# Patient Record
Sex: FEMALE | Race: BLACK OR AFRICAN AMERICAN | NOT HISPANIC OR LATINO | Employment: UNEMPLOYED | URBAN - METROPOLITAN AREA
[De-identification: names, ages, dates, MRNs, and addresses within clinical notes are randomized per-mention and may not be internally consistent; named-entity substitution may affect disease eponyms.]

---

## 2023-07-21 ENCOUNTER — APPOINTMENT (OUTPATIENT)
Dept: GENERAL RADIOLOGY | Facility: HOSPITAL | Age: 63
DRG: 190 | End: 2023-07-21
Payer: COMMERCIAL

## 2023-07-21 ENCOUNTER — APPOINTMENT (OUTPATIENT)
Dept: CT IMAGING | Facility: HOSPITAL | Age: 63
DRG: 190 | End: 2023-07-21
Payer: COMMERCIAL

## 2023-07-21 ENCOUNTER — HOSPITAL ENCOUNTER (INPATIENT)
Facility: HOSPITAL | Age: 63
LOS: 4 days | Discharge: HOME | DRG: 190 | End: 2023-07-25
Attending: EMERGENCY MEDICINE | Admitting: STUDENT IN AN ORGANIZED HEALTH CARE EDUCATION/TRAINING PROGRAM
Payer: COMMERCIAL

## 2023-07-21 DIAGNOSIS — J96.01 ACUTE RESPIRATORY FAILURE WITH HYPOXIA: ICD-10-CM

## 2023-07-21 DIAGNOSIS — J18.9 PNEUMONIA DUE TO INFECTIOUS ORGANISM, UNSPECIFIED LATERALITY, UNSPECIFIED PART OF LUNG: ICD-10-CM

## 2023-07-21 DIAGNOSIS — R07.89 CHEST PAIN, ATYPICAL: ICD-10-CM

## 2023-07-21 DIAGNOSIS — J44.1 COPD WITH ACUTE EXACERBATION: Primary | ICD-10-CM

## 2023-07-21 DIAGNOSIS — J44.1 COPD EXACERBATION: ICD-10-CM

## 2023-07-21 DIAGNOSIS — E87.6 HYPOKALEMIA: ICD-10-CM

## 2023-07-21 LAB
ALBUMIN SERPL-MCNC: 4.4 G/DL (ref 3.5–5.2)
ALBUMIN/GLOB SERPL: 1.9 G/DL
ALP SERPL-CCNC: 64 U/L (ref 39–117)
ALT SERPL W P-5'-P-CCNC: 16 U/L (ref 1–33)
ANION GAP SERPL CALCULATED.3IONS-SCNC: 14.5 MMOL/L (ref 5–15)
AST SERPL-CCNC: 19 U/L (ref 1–32)
BILIRUB SERPL-MCNC: 0.4 MG/DL (ref 0–1.2)
BUN SERPL-MCNC: 9 MG/DL (ref 8–23)
BUN/CREAT SERPL: 16.1 (ref 7–25)
CALCIUM SPEC-SCNC: 9.4 MG/DL (ref 8.6–10.5)
CHLORIDE SERPL-SCNC: 100 MMOL/L (ref 98–107)
CO2 SERPL-SCNC: 28.5 MMOL/L (ref 22–29)
CREAT SERPL-MCNC: 0.56 MG/DL (ref 0.57–1)
D-LACTATE SERPL-SCNC: 1.9 MMOL/L (ref 0.5–2)
D-LACTATE SERPL-SCNC: 2 MMOL/L (ref 0.5–2)
D-LACTATE SERPL-SCNC: 2.2 MMOL/L (ref 0.5–2)
D-LACTATE SERPL-SCNC: 2.3 MMOL/L (ref 0.5–2)
DEPRECATED RDW RBC AUTO: 50.4 FL (ref 37–54)
EGFRCR SERPLBLD CKD-EPI 2021: 103.3 ML/MIN/1.73
EOSINOPHIL # BLD MANUAL: 1.04 10*3/MM3 (ref 0–0.4)
EOSINOPHIL NFR BLD MANUAL: 8 % (ref 0.3–6.2)
ERYTHROCYTE [DISTWIDTH] IN BLOOD BY AUTOMATED COUNT: 15.9 % (ref 12.3–15.4)
FLUAV AG NPH QL: NEGATIVE
FLUBV AG NPH QL IA: NEGATIVE
GEN 5 2HR TROPONIN T REFLEX: 179 NG/L
GLOBULIN UR ELPH-MCNC: 2.3 GM/DL
GLUCOSE BLDC GLUCOMTR-MCNC: 144 MG/DL (ref 70–99)
GLUCOSE SERPL-MCNC: 126 MG/DL (ref 65–99)
HCT VFR BLD AUTO: 40.5 % (ref 34–46.6)
HGB BLD-MCNC: 13.9 G/DL (ref 12–15.9)
HOLD SPECIMEN: NORMAL
HOLD SPECIMEN: NORMAL
LYMPHOCYTES # BLD MANUAL: 7.42 10*3/MM3 (ref 0.7–3.1)
LYMPHOCYTES NFR BLD MANUAL: 6 % (ref 5–12)
MAGNESIUM SERPL-MCNC: 1.6 MG/DL (ref 1.6–2.4)
MCH RBC QN AUTO: 29.9 PG (ref 26.6–33)
MCHC RBC AUTO-ENTMCNC: 34.3 G/DL (ref 31.5–35.7)
MCV RBC AUTO: 87.1 FL (ref 79–97)
MONOCYTES # BLD: 0.78 10*3/MM3 (ref 0.1–0.9)
NEUTROPHILS # BLD AUTO: 3.78 10*3/MM3 (ref 1.7–7)
NEUTROPHILS NFR BLD MANUAL: 29 % (ref 42.7–76)
NT-PROBNP SERPL-MCNC: 176.1 PG/ML (ref 0–900)
PLAT MORPH BLD: NORMAL
PLATELET # BLD AUTO: 298 10*3/MM3 (ref 140–450)
PMV BLD AUTO: 9.6 FL (ref 6–12)
POTASSIUM SERPL-SCNC: 2.9 MMOL/L (ref 3.5–5.2)
PROCALCITONIN SERPL-MCNC: 0.02 NG/ML (ref 0–0.25)
PROT SERPL-MCNC: 6.7 G/DL (ref 6–8.5)
QT INTERVAL: 391 MS
QT INTERVAL: 426 MS
RBC # BLD AUTO: 4.65 10*6/MM3 (ref 3.77–5.28)
RBC MORPH BLD: NORMAL
SCAN SLIDE: NORMAL
SODIUM SERPL-SCNC: 143 MMOL/L (ref 136–145)
TROPONIN T DELTA: -7 NG/L
TROPONIN T SERPL HS-MCNC: 186 NG/L
TROPONIN T SERPL HS-MCNC: 33 NG/L
VARIANT LYMPHS NFR BLD MANUAL: 57 % (ref 19.6–45.3)
WBC MORPH BLD: NORMAL
WBC NRBC COR # BLD: 13.02 10*3/MM3 (ref 3.4–10.8)
WHOLE BLOOD HOLD COAG: NORMAL
WHOLE BLOOD HOLD COAG: NORMAL
WHOLE BLOOD HOLD SPECIMEN: NORMAL
WHOLE BLOOD HOLD SPECIMEN: NORMAL

## 2023-07-21 PROCEDURE — 94799 UNLISTED PULMONARY SVC/PX: CPT

## 2023-07-21 PROCEDURE — 0 POTASSIUM CHLORIDE 10 MEQ/100ML SOLUTION: Performed by: STUDENT IN AN ORGANIZED HEALTH CARE EDUCATION/TRAINING PROGRAM

## 2023-07-21 PROCEDURE — 93010 ELECTROCARDIOGRAM REPORT: CPT | Performed by: INTERNAL MEDICINE

## 2023-07-21 PROCEDURE — 94640 AIRWAY INHALATION TREATMENT: CPT

## 2023-07-21 PROCEDURE — 25010000002 MAGNESIUM SULFATE IN D5W 1G/100ML (PREMIX) 1-5 GM/100ML-% SOLUTION: Performed by: STUDENT IN AN ORGANIZED HEALTH CARE EDUCATION/TRAINING PROGRAM

## 2023-07-21 PROCEDURE — 71260 CT THORAX DX C+: CPT

## 2023-07-21 PROCEDURE — 99285 EMERGENCY DEPT VISIT HI MDM: CPT

## 2023-07-21 PROCEDURE — 83605 ASSAY OF LACTIC ACID: CPT | Performed by: EMERGENCY MEDICINE

## 2023-07-21 PROCEDURE — 80053 COMPREHEN METABOLIC PANEL: CPT | Performed by: EMERGENCY MEDICINE

## 2023-07-21 PROCEDURE — 71045 X-RAY EXAM CHEST 1 VIEW: CPT

## 2023-07-21 PROCEDURE — 84145 PROCALCITONIN (PCT): CPT | Performed by: STUDENT IN AN ORGANIZED HEALTH CARE EDUCATION/TRAINING PROGRAM

## 2023-07-21 PROCEDURE — 85007 BL SMEAR W/DIFF WBC COUNT: CPT | Performed by: EMERGENCY MEDICINE

## 2023-07-21 PROCEDURE — 25010000002 ONDANSETRON PER 1 MG: Performed by: INTERNAL MEDICINE

## 2023-07-21 PROCEDURE — 87804 INFLUENZA ASSAY W/OPTIC: CPT | Performed by: STUDENT IN AN ORGANIZED HEALTH CARE EDUCATION/TRAINING PROGRAM

## 2023-07-21 PROCEDURE — 82948 REAGENT STRIP/BLOOD GLUCOSE: CPT

## 2023-07-21 PROCEDURE — 25010000002 ENOXAPARIN PER 10 MG: Performed by: INTERNAL MEDICINE

## 2023-07-21 PROCEDURE — 25510000001 IOPAMIDOL PER 1 ML: Performed by: INTERNAL MEDICINE

## 2023-07-21 PROCEDURE — 36415 COLL VENOUS BLD VENIPUNCTURE: CPT | Performed by: EMERGENCY MEDICINE

## 2023-07-21 PROCEDURE — 83880 ASSAY OF NATRIURETIC PEPTIDE: CPT | Performed by: EMERGENCY MEDICINE

## 2023-07-21 PROCEDURE — 25010000002 METHYLPREDNISOLONE PER 125 MG: Performed by: STUDENT IN AN ORGANIZED HEALTH CARE EDUCATION/TRAINING PROGRAM

## 2023-07-21 PROCEDURE — 83735 ASSAY OF MAGNESIUM: CPT | Performed by: EMERGENCY MEDICINE

## 2023-07-21 PROCEDURE — 25010000002 CEFTRIAXONE PER 250 MG: Performed by: EMERGENCY MEDICINE

## 2023-07-21 PROCEDURE — 94664 DEMO&/EVAL PT USE INHALER: CPT

## 2023-07-21 PROCEDURE — 99221 1ST HOSP IP/OBS SF/LOW 40: CPT | Performed by: INTERNAL MEDICINE

## 2023-07-21 PROCEDURE — 93005 ELECTROCARDIOGRAM TRACING: CPT | Performed by: EMERGENCY MEDICINE

## 2023-07-21 PROCEDURE — 84484 ASSAY OF TROPONIN QUANT: CPT | Performed by: STUDENT IN AN ORGANIZED HEALTH CARE EDUCATION/TRAINING PROGRAM

## 2023-07-21 PROCEDURE — 99222 1ST HOSP IP/OBS MODERATE 55: CPT | Performed by: STUDENT IN AN ORGANIZED HEALTH CARE EDUCATION/TRAINING PROGRAM

## 2023-07-21 PROCEDURE — 85025 COMPLETE CBC W/AUTO DIFF WBC: CPT | Performed by: EMERGENCY MEDICINE

## 2023-07-21 PROCEDURE — 25010000002 ENOXAPARIN PER 10 MG: Performed by: STUDENT IN AN ORGANIZED HEALTH CARE EDUCATION/TRAINING PROGRAM

## 2023-07-21 PROCEDURE — 99291 CRITICAL CARE FIRST HOUR: CPT | Performed by: INTERNAL MEDICINE

## 2023-07-21 PROCEDURE — 84484 ASSAY OF TROPONIN QUANT: CPT | Performed by: EMERGENCY MEDICINE

## 2023-07-21 PROCEDURE — 93005 ELECTROCARDIOGRAM TRACING: CPT | Performed by: INTERNAL MEDICINE

## 2023-07-21 PROCEDURE — 87040 BLOOD CULTURE FOR BACTERIA: CPT | Performed by: EMERGENCY MEDICINE

## 2023-07-21 PROCEDURE — 25010000002 AZITHROMYCIN PER 500 MG: Performed by: EMERGENCY MEDICINE

## 2023-07-21 RX ORDER — ENOXAPARIN SODIUM 100 MG/ML
60 INJECTION SUBCUTANEOUS EVERY 24 HOURS
Status: DISCONTINUED | OUTPATIENT
Start: 2023-07-22 | End: 2023-07-21

## 2023-07-21 RX ORDER — IPRATROPIUM BROMIDE AND ALBUTEROL SULFATE 2.5; .5 MG/3ML; MG/3ML
3 SOLUTION RESPIRATORY (INHALATION)
Status: DISCONTINUED | OUTPATIENT
Start: 2023-07-21 | End: 2023-07-25 | Stop reason: HOSPADM

## 2023-07-21 RX ORDER — CEFTRIAXONE SODIUM 1 G/50ML
1000 INJECTION, SOLUTION INTRAVENOUS EVERY 24 HOURS
Status: DISCONTINUED | OUTPATIENT
Start: 2023-07-22 | End: 2023-07-25 | Stop reason: HOSPADM

## 2023-07-21 RX ORDER — GUAIFENESIN 600 MG/1
600 TABLET, EXTENDED RELEASE ORAL EVERY 12 HOURS SCHEDULED
Status: DISCONTINUED | OUTPATIENT
Start: 2023-07-21 | End: 2023-07-25 | Stop reason: HOSPADM

## 2023-07-21 RX ORDER — IPRATROPIUM BROMIDE AND ALBUTEROL SULFATE 2.5; .5 MG/3ML; MG/3ML
3 SOLUTION RESPIRATORY (INHALATION) ONCE
Status: COMPLETED | OUTPATIENT
Start: 2023-07-21 | End: 2023-07-21

## 2023-07-21 RX ORDER — ENOXAPARIN SODIUM 100 MG/ML
1 INJECTION SUBCUTANEOUS EVERY 12 HOURS
Status: DISCONTINUED | OUTPATIENT
Start: 2023-07-21 | End: 2023-07-25 | Stop reason: HOSPADM

## 2023-07-21 RX ORDER — METOPROLOL SUCCINATE 25 MG/1
25 TABLET, EXTENDED RELEASE ORAL
Status: DISCONTINUED | OUTPATIENT
Start: 2023-07-21 | End: 2023-07-25 | Stop reason: HOSPADM

## 2023-07-21 RX ORDER — ASPIRIN 81 MG/1
324 TABLET, CHEWABLE ORAL ONCE
Status: COMPLETED | OUTPATIENT
Start: 2023-07-21 | End: 2023-07-21

## 2023-07-21 RX ORDER — MORPHINE SULFATE 2 MG/ML
2 INJECTION, SOLUTION INTRAMUSCULAR; INTRAVENOUS EVERY 4 HOURS PRN
Status: DISCONTINUED | OUTPATIENT
Start: 2023-07-21 | End: 2023-07-25 | Stop reason: HOSPADM

## 2023-07-21 RX ORDER — CEFTRIAXONE SODIUM 1 G/50ML
1000 INJECTION, SOLUTION INTRAVENOUS ONCE
Status: COMPLETED | OUTPATIENT
Start: 2023-07-21 | End: 2023-07-21

## 2023-07-21 RX ORDER — ACETAMINOPHEN 325 MG/1
650 TABLET ORAL EVERY 6 HOURS PRN
Status: DISCONTINUED | OUTPATIENT
Start: 2023-07-21 | End: 2023-07-25 | Stop reason: HOSPADM

## 2023-07-21 RX ORDER — MAGNESIUM SULFATE 1 G/100ML
1 INJECTION INTRAVENOUS ONCE
Status: COMPLETED | OUTPATIENT
Start: 2023-07-21 | End: 2023-07-21

## 2023-07-21 RX ORDER — ENOXAPARIN SODIUM 100 MG/ML
40 INJECTION SUBCUTANEOUS EVERY 24 HOURS
Status: DISCONTINUED | OUTPATIENT
Start: 2023-07-21 | End: 2023-07-21

## 2023-07-21 RX ORDER — MULTIPLE VITAMINS W/ MINERALS TAB 9MG-400MCG
1 TAB ORAL DAILY
COMMUNITY

## 2023-07-21 RX ORDER — ENOXAPARIN SODIUM 100 MG/ML
30 INJECTION SUBCUTANEOUS ONCE
Status: COMPLETED | OUTPATIENT
Start: 2023-07-21 | End: 2023-07-21

## 2023-07-21 RX ORDER — SODIUM CHLORIDE 0.9 % (FLUSH) 0.9 %
10 SYRINGE (ML) INJECTION AS NEEDED
Status: DISCONTINUED | OUTPATIENT
Start: 2023-07-21 | End: 2023-07-25 | Stop reason: HOSPADM

## 2023-07-21 RX ORDER — POTASSIUM CHLORIDE 7.45 MG/ML
10 INJECTION INTRAVENOUS ONCE
Status: COMPLETED | OUTPATIENT
Start: 2023-07-21 | End: 2023-07-21

## 2023-07-21 RX ORDER — NITROGLYCERIN 0.4 MG/1
0.4 TABLET SUBLINGUAL
Status: DISCONTINUED | OUTPATIENT
Start: 2023-07-21 | End: 2023-07-25 | Stop reason: HOSPADM

## 2023-07-21 RX ORDER — POTASSIUM CHLORIDE 750 MG/1
40 CAPSULE, EXTENDED RELEASE ORAL ONCE
Status: COMPLETED | OUTPATIENT
Start: 2023-07-21 | End: 2023-07-21

## 2023-07-21 RX ORDER — NICOTINE 21 MG/24HR
1 PATCH, TRANSDERMAL 24 HOURS TRANSDERMAL EVERY 24 HOURS
Status: DISCONTINUED | OUTPATIENT
Start: 2023-07-21 | End: 2023-07-25 | Stop reason: HOSPADM

## 2023-07-21 RX ORDER — METHYLPREDNISOLONE SODIUM SUCCINATE 125 MG/2ML
125 INJECTION, POWDER, LYOPHILIZED, FOR SOLUTION INTRAMUSCULAR; INTRAVENOUS ONCE
Status: DISCONTINUED | OUTPATIENT
Start: 2023-07-21 | End: 2023-07-21

## 2023-07-21 RX ORDER — ENOXAPARIN SODIUM 100 MG/ML
20 INJECTION SUBCUTANEOUS ONCE
Status: DISCONTINUED | OUTPATIENT
Start: 2023-07-21 | End: 2023-07-21

## 2023-07-21 RX ORDER — BUTALBITAL, ACETAMINOPHEN AND CAFFEINE 300; 40; 50 MG/1; MG/1; MG/1
1 CAPSULE ORAL EVERY 4 HOURS PRN
Status: DISCONTINUED | OUTPATIENT
Start: 2023-07-21 | End: 2023-07-25 | Stop reason: HOSPADM

## 2023-07-21 RX ORDER — METHYLPREDNISOLONE SODIUM SUCCINATE 125 MG/2ML
62.5 INJECTION, POWDER, LYOPHILIZED, FOR SOLUTION INTRAMUSCULAR; INTRAVENOUS DAILY
Status: DISCONTINUED | OUTPATIENT
Start: 2023-07-21 | End: 2023-07-22

## 2023-07-21 RX ORDER — ONDANSETRON 2 MG/ML
4 INJECTION INTRAMUSCULAR; INTRAVENOUS EVERY 4 HOURS PRN
Status: DISCONTINUED | OUTPATIENT
Start: 2023-07-21 | End: 2023-07-25 | Stop reason: HOSPADM

## 2023-07-21 RX ORDER — HYDROCHLOROTHIAZIDE 25 MG/1
25 TABLET ORAL DAILY
COMMUNITY

## 2023-07-21 RX ORDER — AZITHROMYCIN 250 MG/1
500 TABLET, FILM COATED ORAL EVERY 24 HOURS
Status: COMPLETED | OUTPATIENT
Start: 2023-07-22 | End: 2023-07-23

## 2023-07-21 RX ADMIN — GUAIFENESIN 600 MG: 600 TABLET ORAL at 12:35

## 2023-07-21 RX ADMIN — IPRATROPIUM BROMIDE AND ALBUTEROL SULFATE 3 ML: .5; 3 SOLUTION RESPIRATORY (INHALATION) at 02:45

## 2023-07-21 RX ADMIN — METHYLPREDNISOLONE SODIUM SUCCINATE 62.5 MG: 125 INJECTION INTRAMUSCULAR; INTRAVENOUS at 12:34

## 2023-07-21 RX ADMIN — BUTALBITA,ACETAMINOPHEN AND CAFFEINE 1 CAPSULE: 50; 300; 40 CAPSULE ORAL at 17:35

## 2023-07-21 RX ADMIN — ASPIRIN 324 MG: 81 TABLET, CHEWABLE ORAL at 11:26

## 2023-07-21 RX ADMIN — ONDANSETRON 4 MG: 2 INJECTION INTRAMUSCULAR; INTRAVENOUS at 20:33

## 2023-07-21 RX ADMIN — IPRATROPIUM BROMIDE AND ALBUTEROL SULFATE 3 ML: 2.5; .5 SOLUTION RESPIRATORY (INHALATION) at 20:03

## 2023-07-21 RX ADMIN — ENOXAPARIN SODIUM 30 MG: 100 INJECTION SUBCUTANEOUS at 11:26

## 2023-07-21 RX ADMIN — IOPAMIDOL 100 ML: 755 INJECTION, SOLUTION INTRAVENOUS at 22:42

## 2023-07-21 RX ADMIN — IPRATROPIUM BROMIDE AND ALBUTEROL SULFATE 3 ML: 2.5; .5 SOLUTION RESPIRATORY (INHALATION) at 12:45

## 2023-07-21 RX ADMIN — METOPROLOL SUCCINATE 25 MG: 25 TABLET, EXTENDED RELEASE ORAL at 11:26

## 2023-07-21 RX ADMIN — ENOXAPARIN SODIUM 40 MG: 100 INJECTION SUBCUTANEOUS at 08:48

## 2023-07-21 RX ADMIN — Medication 10 ML: at 21:39

## 2023-07-21 RX ADMIN — AZITHROMYCIN 500 MG: 500 INJECTION, POWDER, LYOPHILIZED, FOR SOLUTION INTRAVENOUS at 06:04

## 2023-07-21 RX ADMIN — IPRATROPIUM BROMIDE AND ALBUTEROL SULFATE 3 ML: .5; 3 SOLUTION RESPIRATORY (INHALATION) at 05:31

## 2023-07-21 RX ADMIN — GUAIFENESIN 600 MG: 600 TABLET ORAL at 21:38

## 2023-07-21 RX ADMIN — NICOTINE 1 PATCH: 21 PATCH, EXTENDED RELEASE TRANSDERMAL at 08:48

## 2023-07-21 RX ADMIN — POTASSIUM CHLORIDE 10 MEQ: 7.46 INJECTION, SOLUTION INTRAVENOUS at 08:48

## 2023-07-21 RX ADMIN — CEFTRIAXONE SODIUM 1000 MG: 1 INJECTION, SOLUTION INTRAVENOUS at 05:28

## 2023-07-21 RX ADMIN — POTASSIUM CHLORIDE 40 MEQ: 750 CAPSULE, EXTENDED RELEASE ORAL at 06:01

## 2023-07-21 RX ADMIN — IPRATROPIUM BROMIDE AND ALBUTEROL SULFATE 3 ML: 2.5; .5 SOLUTION RESPIRATORY (INHALATION) at 08:55

## 2023-07-21 RX ADMIN — ENOXAPARIN SODIUM 60 MG: 60 INJECTION SUBCUTANEOUS at 21:38

## 2023-07-21 RX ADMIN — MAGNESIUM SULFATE 1 G: 1 INJECTION INTRAVENOUS at 11:26

## 2023-07-21 RX ADMIN — NITROGLYCERIN 0.4 MG: 0.4 TABLET, ORALLY DISINTEGRATING SUBLINGUAL at 09:39

## 2023-07-21 RX ADMIN — ACETAMINOPHEN 650 MG: 325 TABLET ORAL at 12:34

## 2023-07-21 RX ADMIN — IPRATROPIUM BROMIDE AND ALBUTEROL SULFATE 3 ML: 2.5; .5 SOLUTION RESPIRATORY (INHALATION) at 15:45

## 2023-07-21 NOTE — CASE MANAGEMENT/SOCIAL WORK
Discharge Planning Assessment   [place]     Patient Name: Gloria Sunshine  MRN: [id number]  Today's Date: 7/21/2023    Admit Date: 7/21/2023    Plan: patient is not aware of her pcp name, [place] [facility] [place] [address]. patient is independent at home with spouse and step son (adult) patient is currently working full time. plans are to return home at discharge.   Discharge Needs Assessment    No documentation.                  Discharge Plan       Row Name 07/21/23 1714       Plan    Plan patient is not aware of her pcp name, [place] [facility] [place] [address] would like [facility]. patient is independent at home with spouse and step son (adult) patient is currently working full time. plans are to return home at discharge.    Final Discharge Disposition Code 01 - home or self-care                  Continued Care and Services - Admitted Since 7/21/2023    Coordination has not been started for this encounter.          Demographic Summary    No documentation.                  Functional Status    No documentation.                  Psychosocial    No documentation.                  Abuse/Neglect    No documentation.                  Legal    No documentation.                  Substance Abuse    No documentation.                  Patient Forms    No documentation.                     Rosaura Pardo RN

## 2023-07-21 NOTE — ED PROVIDER NOTES
Time: 5:28 AM EDT  Date of encounter:  7/21/2023  Independent Historian/Clinical History and Information was obtained by:   Patient    History is limited by: N/A    Chief Complaint: shortness of breath      History of Present Illness:  Patient is a 63 y.o. year old female who presents to the emergency department for evaluation of Shortness of breath.  Patient states she has been feeling short of breath for last 2-week with a cough.  Cough is productive with yellow sputum.  Last night it got acutely worse.  She states she was unable to breathe.  She called EMS and was brought to the emergency department she did receive a nebulizer treatment as well as dose of Solu-Medrol by EMS.  She denies any known history of COPD.  She does report history of smoking.    HPI    Patient Care Team  Primary Care Provider: Provider, No Known    Past Medical History:     No Known Allergies  Past Medical History:   Diagnosis Date    Hypertension      Past Surgical History:   Procedure Laterality Date    CYST REMOVAL Left     breast    TUBAL ABDOMINAL LIGATION       History reviewed. No pertinent family history.    Home Medications:  Prior to Admission medications    Not on File        Social History:   Social History     Tobacco Use    Smoking status: Every Day     Packs/day: 0.50     Types: Cigarettes         Review of Systems:  Review of Systems   Constitutional:  Negative for chills and fever.   HENT:  Negative for congestion, ear pain and sore throat.    Eyes:  Negative for pain.   Respiratory:  Positive for cough and shortness of breath. Negative for chest tightness.    Cardiovascular:  Negative for chest pain.   Gastrointestinal:  Negative for abdominal pain, diarrhea, nausea and vomiting.   Genitourinary:  Negative for flank pain and hematuria.   Musculoskeletal:  Negative for joint swelling.   Skin:  Negative for pallor.   Neurological:  Negative for seizures and headaches.   Psychiatric/Behavioral:  The patient is  "nervous/anxious.    All other systems reviewed and are negative.     Physical Exam:  /98 (BP Location: Right arm, Patient Position: Sitting)   Pulse 82   Temp 97.7 °F (36.5 °C) (Oral)   Resp 18   Ht 180.3 cm (71\")   Wt 56.4 kg (124 lb 5.4 oz)   SpO2 97%   BMI 17.34 kg/m²     Physical Exam  Constitutional:       Appearance: Normal appearance.   HENT:      Head: Normocephalic and atraumatic.      Nose: Nose normal.      Mouth/Throat:      Mouth: Mucous membranes are moist.   Eyes:      Extraocular Movements: Extraocular movements intact.      Conjunctiva/sclera: Conjunctivae normal.      Pupils: Pupils are equal, round, and reactive to light.   Cardiovascular:      Rate and Rhythm: Normal rate and regular rhythm.      Pulses: Normal pulses.      Heart sounds: Normal heart sounds.   Pulmonary:      Effort: Pulmonary effort is normal.      Breath sounds: Wheezing and rhonchi present.   Abdominal:      General: There is no distension.      Palpations: Abdomen is soft.      Tenderness: There is no abdominal tenderness.   Musculoskeletal:         General: Normal range of motion.      Cervical back: Normal range of motion.   Skin:     General: Skin is warm and dry.      Capillary Refill: Capillary refill takes less than 2 seconds.   Neurological:      General: No focal deficit present.      Mental Status: She is alert and oriented to person, place, and time. Mental status is at baseline.   Psychiatric:         Mood and Affect: Mood normal.         Behavior: Behavior normal.                Procedures:  Procedures      Medical Decision Making:      Comorbidities that affect care:    Hypertension, Smoking    External Notes reviewed:    None      The following orders were placed and all results were independently analyzed by me:  Orders Placed This Encounter   Procedures    Blood Culture - Blood,    Blood Culture - Blood,    XR Chest 1 View    Baldwin Draw    Comprehensive Metabolic Panel    BNP    Single High " Sensitivity Troponin T    CBC Auto Differential    Lactic Acid, Plasma    Scan Slide    Manual Differential    Magnesium    STAT Lactic Acid, Reflex    NPO Diet NPO Type: Strict NPO    Undress & Gown    Continuous Pulse Oximetry    Vital Signs    Inpatient Hospitalist Consult    Oxygen Therapy- Nasal Cannula; Titrate 1-6 LPM Per SpO2; 90 - 95%    ECG 12 Lead ED Triage Standing Order; SOA    Insert Peripheral IV    CBC & Differential    Green Top (Gel)    Lavender Top    Gold Top - SST    Light Blue Top       Medications Given in the Emergency Department:  Medications   sodium chloride 0.9 % flush 10 mL (has no administration in time range)   methylPREDNISolone sodium succinate (SOLU-Medrol) injection 125 mg (125 mg Intravenous Not Given 7/21/23 0247)   cefTRIAXone (ROCEPHIN) IVPB 1,000 mg (1,000 mg Intravenous New Bag 7/21/23 0528)   AZITHROMYCIN 500 MG/250 ML 0.9% NS IVPB (vial-mate) (has no administration in time range)   potassium chloride 10 mEq in 100 mL IVPB (has no administration in time range)   potassium chloride (MICRO-K) CR capsule 40 mEq (has no administration in time range)   ipratropium-albuterol (DUO-NEB) nebulizer solution 3 mL (3 mL Nebulization Given 7/21/23 0245)   ipratropium-albuterol (DUO-NEB) nebulizer solution 3 mL (3 mL Nebulization Given 7/21/23 0245)   ipratropium-albuterol (DUO-NEB) nebulizer solution 3 mL (3 mL Nebulization Given 7/21/23 0531)        ED Course:         Labs:    Lab Results (last 24 hours)       Procedure Component Value Units Date/Time    CBC & Differential [[id number]]  (Abnormal) Collected: 07/21/23 0125    Specimen: Blood Updated: 07/21/23 0158    Narrative:      The following orders were created for panel order CBC & Differential.  Procedure                               Abnormality         Status                     ---------                               -----------         ------                     CBC Auto Differential[[id number]]        Abnormal            Final  result               Scan Slide[[id number]]                                       Final result                 Please view results for these tests on the individual orders.    Comprehensive Metabolic Panel [[id number]]  (Abnormal) Collected: 07/21/23 0125    Specimen: Blood Updated: 07/21/23 0221     Glucose 126 mg/dL      BUN 9 mg/dL      Creatinine 0.56 mg/dL      Sodium 143 mmol/L      Potassium 2.9 mmol/L      Chloride 100 mmol/L      CO2 28.5 mmol/L      Calcium 9.4 mg/dL      Total Protein 6.7 g/dL      Albumin 4.4 g/dL      ALT (SGPT) 16 U/L      AST (SGOT) 19 U/L      Alkaline Phosphatase 64 U/L      Total Bilirubin 0.4 mg/dL      Globulin 2.3 gm/dL      A/G Ratio 1.9 g/dL      BUN/Creatinine Ratio 16.1     Anion Gap 14.5 mmol/L      eGFR 103.3 mL/min/1.73     Narrative:      GFR Normal >60  Chronic Kidney Disease <60  Kidney Failure <15      BNP [[id number]]  (Normal) Collected: 07/21/23 0125    Specimen: Blood Updated: 07/21/23 0220     proBNP 176.1 pg/mL     Narrative:      Among patients with dyspnea, NT-proBNP is highly sensitive for the detection of acute congestive heart failure. In addition NT-proBNP of <300 pg/ml effectively rules out acute congestive heart failure with 99% negative predictive value.      Single High Sensitivity Troponin T [[id number]]  (Abnormal) Collected: 07/21/23 0125    Specimen: Blood Updated: 07/21/23 0221     HS Troponin T 33 ng/L     Narrative:      High Sensitive Troponin T Reference Range:  <10.0 ng/L- Negative Female for AMI  <15.0 ng/L- Negative Male for AMI  >=10 - Abnormal Female indicating possible myocardial injury.  >=15 - Abnormal Male indicating possible myocardial injury.   Clinicians would have to utilize clinical acumen, EKG, Troponin, and serial changes to determine if it is an Acute Myocardial Infarction or myocardial injury due to an underlying chronic condition.         CBC Auto Differential [[id number]]  (Abnormal) Collected: 07/21/23 0125    Specimen: Blood  Updated: 07/21/23 0158     WBC 13.02 10*3/mm3      RBC 4.65 10*6/mm3      Hemoglobin 13.9 g/dL      Hematocrit 40.5 %      MCV 87.1 fL      MCH 29.9 pg      MCHC 34.3 g/dL      RDW 15.9 %      RDW-SD 50.4 fl      MPV 9.6 fL      Platelets 298 10*3/mm3     Blood Culture - Blood, Arm, Right [[id number]] Collected: 07/21/23 0125    Specimen: Blood from Arm, Right Updated: 07/21/23 0133    Lactic Acid, Plasma [[id number]]  (Abnormal) Collected: 07/21/23 0125    Specimen: Blood Updated: 07/21/23 0232     Lactate 2.2 mmol/L     Scan Slide [[id number]] Collected: 07/21/23 0125    Specimen: Blood Updated: 07/21/23 0158     Scan Slide --     Comment: See Manual Differential Results       Manual Differential [[id number]]  (Abnormal) Collected: 07/21/23 0125    Specimen: Blood Updated: 07/21/23 0158     Neutrophil % 29.0 %      Lymphocyte % 57.0 %      Monocyte % 6.0 %      Eosinophil % 8.0 %      Neutrophils Absolute 3.78 10*3/mm3      Lymphocytes Absolute 7.42 10*3/mm3      Monocytes Absolute 0.78 10*3/mm3      Eosinophils Absolute 1.04 10*3/mm3      RBC Morphology Normal     WBC Morphology Normal     Platelet Morphology Normal    Magnesium [[id number]]  (Normal) Collected: 07/21/23 0125    Specimen: Blood Updated: 07/21/23 0237     Magnesium 1.6 mg/dL     Blood Culture - Blood, Arm, Right [[id number]] Collected: 07/21/23 0127    Specimen: Blood from Arm, Right Updated: 07/21/23 0133             Imaging:    XR Chest 1 View    Result Date: 7/21/2023  PROCEDURE: XR CHEST 1 VW  COMPARISON: 9/17/2020.  INDICATIONS: Shortness of breath.  FINDINGS:  A single AP (or PA) upright portable chest radiograph was performed.  No cardiac enlargement is seen.  No acute infiltrate is appreciated.  No pleural effusion or pneumothorax is identified.  External artifacts obscure detail.  The thoracic aorta is mildly atherosclerotic.  No significant interval change is seen since the prior study (or studies).        No acute infiltrate is appreciated.      Please note that portions of this note were completed with a voice recognition program.  ANNETTE FOWLER JR, MD       Electronically Signed and Approved By: ANNETTE FOWLER JR, MD on 7/21/2023 at 1:45                 Differential Diagnosis and Discussion:    Dyspnea: Differential diagnosis includes but is not limited to metabolic acidosis, neurological disorders, psychogenic, asthma, pneumothorax, upper airway obstruction, COPD, pneumonia, noncardiogenic pulmonary edema, interstitial lung disease, anemia, congestive heart failure, and pulmonary embolism    All labs were reviewed and interpreted by me.  All X-rays impressions were independently interpreted by me.  EKG was interpreted by me.    MDM  Number of Diagnoses or Management Options  Diagnosis management comments: Patient presents emergency department with shortness of breath.  Vital signs remarkable for O2 sat in the low 90s.  Patient has diffuse wheezes.  She was given several nebulizer treatment but despite treatment remains short of breath with persistent wheezing.  Patient also reports productive cough.  X-ray did not show any acute finding.  Clinical picture concerning for pneumonia.  We will treat for possible pneumonia.  With persistent symptoms discussed patient with hospitalist and will admit for further care.       Amount and/or Complexity of Data Reviewed  Clinical lab tests: reviewed  Tests in the radiology section of CPT®: reviewed  Tests in the medicine section of CPT®: reviewed  Review and summarize past medical records: yes  Independent visualization of images, tracings, or specimens: yes    Risk of Complications, Morbidity, and/or Mortality  Presenting problems: moderate  Management options: moderate         Critical Care Note: Total Critical Care time of 35 minutes. Total critical care time documented does not include time spent on separately billed procedures for services of nurses or physician assistants. I personally saw and examined the  patient. I have reviewed all diagnostic interpretations and treatment plans as written. I was present for the key portions of any procedures performed and the inclusive time noted in any critical care statement. Critical care time includes patient management by me, time spent at the patients bedside,  time to review lab and imaging results, discussing patient care, documentation in the medical record, and time spent with family or caregiver.    Patient Care Considerations:          Consultants/Shared Management Plan:    Hospitalist: I have discussed the case with Dr Roman who agrees to accept the patient for admission.    Social Determinants of Health:    Patient is independent, reliable, and has access to care.       Disposition and Care Coordination:    Admit:   Through independent evaluation of the patient's history, physical, and imperical data, the patient meets criteria for observation/admission to the hospital.        Final diagnoses:   COPD with acute exacerbation   Pneumonia due to infectious organism, unspecified laterality, unspecified part of lung   Hypokalemia        ED Disposition       ED Disposition   Decision to Admit    Condition   --    Comment   --               This medical record created using voice recognition software.             Anny Lindquist MD  07/21/23 0536

## 2023-07-21 NOTE — H&P
[facility]   HOSPITALIST HISTORY AND PHYSICAL  Date: 2023   Patient Name: Gloria Sunshine  : 1960  MRN: [id number]  Primary Care Physician:  Provider, No Known  Date of admission: 2023    Subjective   Subjective     Chief Complaint: Shortness of breath    HPI:    Gloria Sunshine is a 63 y.o. female no past medical history who presents to the ER due to worsening shortness of breath.  Patient has been ill with URI symptoms since mid  but states she did not seek any medical attention until just 2 weeks ago when she finally went to see a physician who started on a Medrol Dosepak and albuterol inhaler.  She had some improvement on the steroids but did not take her albuterol inhaler until just 24 hours ago.  In that time she had initial improvement in her dyspnea but had progressive worsening of a productive cough without any associated fevers but she did have occasional chills as well.  When she completed the steroids her dyspnea got worse again and she finally started to use her inhaler over the last 24 to 48 hours without much relief and thus she came to the ER tonight for further evaluation.  Upon arrival here she did have intermittent desaturations to the mid 80s while in the ER requiring placement of 2 L nasal cannula.  She was noted to have significant wheezing upon arrival and there was concern for COPD exacerbation so patient was treated with multiple breathing treatments and 125 mg Solu-Medrol with some improvement in her breathing and wheezing however due to her new oxygen requirement the hospitalist service was contacted admission.  In addition lab work-up revealed hypokalemia for which she has been given 50 mill equivalents of potassium replacement.  EKG done in the ER was personally reviewed and showed sinus rhythm with nonspecific ST-T wave changes in the lateral leads.  Initial troponin was mildly elevated and patient denied having any chest pain.    Personal  History     Past Medical History:  Past Medical History:   Diagnosis Date   • Hypertension          Past Surgical History:  Past Surgical History:   Procedure Laterality Date   • CYST REMOVAL Left     breast   • TUBAL ABDOMINAL LIGATION           Family History:   Reviewed and noncontributory except as mentioned in HPI    Social History:   Social Determinants of Health     Tobacco Use: High Risk   • Smoking Tobacco Use: Every Day   • Smokeless Tobacco Use: Unknown   • Passive Exposure: Not on file   Alcohol Use: Not on file   Financial Resource Strain: Not on file   Food Insecurity: Not on file   Transportation Needs: Not on file   Physical Activity: Not on file   Stress: Not on file   Social Connections: Not on file   Intimate Partner Violence: Not on file   Depression: Not on file   Housing Stability: Not on file         Home Medications:       Allergies:  No Known Allergies    Review of Systems   All systems were reviewed and negative except for: Dyspnea, cough    Objective   Objective     Vitals:   Temp:  [97.7 °F (36.5 °C)] 97.7 °F (36.5 °C)  Heart Rate:  [] 60  Resp:  [18-22] 18  BP: (117-163)/(79-98) 117/79  Flow (L/min):  [2] 2    Physical Exam    Constitutional: Awake, alert, no acute distress, cachectic appearing   Eyes: Pupils equal, sclerae anicteric, no conjunctival injection   HENT: NCAT, mucous membranes moist   Neck: Supple, no thyromegaly, no lymphadenopathy, trachea midline   Respiratory: Diminished breath sounds throughout all lung fields nonlabored respirations    Cardiovascular: RRR, no murmurs, rubs, or gallops, palpable pedal pulses bilaterally   Gastrointestinal: Positive bowel sounds, soft, nontender, nondistended   Musculoskeletal: No bilateral ankle edema, no clubbing or cyanosis to extremities   Psychiatric: Appropriate affect, cooperative   Neurologic: Oriented x 3, strength symmetric in all extremities, Cranial Nerves grossly intact to confrontation, speech clear   Skin: No rashes      Result Review    Result Review:  I have personally reviewed the results from the time of this admission to 7/21/2023 06:21 EDT and agree with these findings:  [x]  Laboratory  [x]  Microbiology  [x]  Radiology  [x]  EKG/Telemetry   []  Cardiology/Vascular   []  Pathology  [x]  Old records  []  Other:      Assessment & Plan   Assessment / Plan     Assessment/Plan:   Acute hypoxic respiratory failure secondary to COPD exacerbation  COPD exacerbation, new onset  Elevated troponin likely secondary to demand ischemia from above process  Hypokalemia  Leukocytosis likely secondary   Active tobacco use, 40 pack years    Plan  - Admit to hospitalist service  - Failed outpatient steroids and thus we will initiate IV steroids daily and assess for improvement.  We will also provide antibiotics for sputum production and worsening dyspnea  - Patient will benefit from outpatient pulmonary follow-up to characterize the severity of her COPD in addition patient has significant eosinophilia on her CBC and may benefit from evaluation for an allergic subtype  - Replace potassium, recheck level  - Recheck troponin      Discussed with ER Physician and Nurse    All labs/imaging studies were personally reviewed and findings are as noted above      DVT Prophylaxis: LVX    CODE STATUS:    Level Of Support Discussed With: Patient  Code Status (Patient has no pulse and is not breathing): CPR (Attempt to Resuscitate)  Medical Interventions (Patient has pulse or is breathing): Full Support  Release to patient: Routine Release      Admission Status:  I believe this patient meets inpatient status.    Electronically signed by Ike Roman MD, 07/21/23, 6:21 AM EDT.

## 2023-07-21 NOTE — PROGRESS NOTES
[facility]   Hospitalist Progress Note  Date: 2023  Patient Name: Gloria Sunshine  : 1960  MRN: [id number]  Date of admission: 2023      Subjective   Subjective     Chief Complaint: Chest pain, SOA    Summary: 62 yo female with HTN presented with a couple of weeks of SOA, cough with some chest pain.  She had started a medrol dosepack and albuterol inhaler as an outpatient but did not improve.  She was admitted with concern for COPD exacerbation and respiratory infection.  Her initial troponin was mildly elevated but she had no significant chest pain.  Repeat increased substantially and she later developed chest pain, diagnosed with NSTEMI and seen by cardiology.    Interval Followup: Responded following RRT for chest pain; pt had been chest pain free but then developed severe left chest pain radiating to left arm and associated with nausea and SOA.  Nitro, morphine and stat EKG ordered.  Cardiology contacted and saw patient promptly.  CP better after nitro.  Moving to monitored bed.    Review of Systems   All systems were reviewed and negative except for: chest pain, soa    Objective   Objective     Vitals:   Temp:  [97.7 °F (36.5 °C)-98.4 °F (36.9 °C)] 98 °F (36.7 °C)  Heart Rate:  [] 80  Resp:  [16-22] 16  BP: (117-163)/(79-98) 149/85  Flow (L/min):  [2] 2  Physical Exam    Constitutional: Awake, alert, mild discomfort   Eyes: Pupils equal, sclerae anicteric, no conjunctival injection   HENT: NCAT, mucous membranes moist   Neck: Supple, no thyromegaly, no lymphadenopathy, trachea midline   Respiratory: dim bs with a few wheezes   Cardiovascular: mild tachy no m   Gastrointestinal: Positive bowel sounds, soft, nontender, nondistended   Musculoskeletal: No bilateral ankle edema, no clubbing or cyanosis to extremities   Psychiatric: Appropriate affect, cooperative   Neurologic: Oriented x 3, speech clear   Skin: No rashes     Result Review    Result Review:  I have reviewed the  following:  [x]  Laboratory  CBC          7/21/2023    01:25   CBC   WBC 13.02    RBC 4.65    Hemoglobin 13.9    Hematocrit 40.5    MCV 87.1    MCH 29.9    MCHC 34.3    RDW 15.9    Platelets 298      CMP          7/21/2023    01:25   CMP   Glucose 126    BUN 9    Creatinine 0.56    EGFR 103.3    Sodium 143    Potassium 2.9    Chloride 100    Calcium 9.4    Total Protein 6.7    Albumin 4.4    Globulin 2.3    Total Bilirubin 0.4    Alkaline Phosphatase 64    AST (SGOT) 19    ALT (SGPT) 16    Albumin/Globulin Ratio 1.9    BUN/Creatinine Ratio 16.1    Anion Gap 14.5      CARDIAC LABS:      Lab 07/21/23  0939 07/21/23  0807 07/21/23  0125   PROBNP  --   --  176.1   HSTROP T 179* 186* 33*      []  Microbiology  [x]  Radiology  XR Chest 1 View    Result Date: 7/21/2023    No acute infiltrate is appreciated.     Please note that portions of this note were completed with a voice recognition program.  ANNETTE FOWLER JR, MD       Electronically Signed and Approved By: ANNETTE FOWLER JR, MD on 7/21/2023 at 1:45               [x]  EKG/Telemetry       []  Cardiology/Vascular   []  Patholog  []  Old records  []  Other:    Assessment & Plan   Assessment / Plan     Assessment:  Acute NSTEMI  Acute exacerbation of COPD  Acute bronchitis  Hypertension  Tobacco abuse    Plan:  Transfer to telemetry bed --> d/w  will go to 3W  Ordered stat aspirin  Increase Lovenox to therapeutic dose  Nitro ordered  Morphine ordered  Echo ordered  Currently relief of CP with ntg  Cardiologist consulted, discussed case twice with Dr. Lucero, appreciate his prompt assistance and recommendations  Will need ischemic workup at some point  Discussed with patient, bedside nurse, RRT nurse, , cardiologist, and pharmacist  30 min critical care time      Discussed plan with RN.    DVT prophylaxis:  Medical DVT prophylaxis orders are present.    CODE STATUS:   Level Of Support Discussed With: Patient  Code Status (Patient has no pulse and  is not breathing): CPR (Attempt to Resuscitate)  Medical Interventions (Patient has pulse or is breathing): Full Support  Release to patient: Routine Release        Electronically signed by Orlin Carreon MD, 07/21/23, 10:51 AM EDT.

## 2023-07-21 NOTE — CONSULTS
CARDIOLOGY CONSULTATION NOTE    Referring Provider: Provider, No Known    Reason for Consultation: nstemi    Gloria Sunshine  1960  63 y.o. female      HPI     63 y.o. female no past medical history who presents to the ER due to worsening shortness of breath.  Patient has been ill with URI symptoms since mid June but states she did not seek any medical attention until just 2 weeks ago when she finally went to see a physician who started on a Medrol Dosepak and albuterol inhaler.  She had some improvement on the steroids but did not take her albuterol inhaler until just 24 hours ago.  In that time she had initial improvement in her dyspnea but had progressive worsening of a productive cough without any associated fevers but she did have occasional chills as well.  When she completed the steroids her dyspnea got worse again and she finally started to use her inhaler over the last 24 to 48 hours without much relief and thus she came to the ER tonight for further evaluation.  Upon arrival here she did have intermittent desaturations to the mid 80s while in the ER requiring placement of 2 L nasal cannula.  She was noted to have significant wheezing upon arrival and there was concern for COPD exacerbation so patient was treated with multiple breathing treatments and 125 mg Solu-Medrol with some improvement in her breathing and wheezing however due to her new oxygen requirement the hospitalist service was contacted admission.     Pt had more chest pain today  Stat ECG showed t wave inversion inferior leads  Pain improved with nitro  Echo pending  On lovenox    SUBJECTIVE    Past Medical History:   Diagnosis Date    Hypertension          Past Surgical History:   Procedure Laterality Date    CYST REMOVAL Left     breast    TUBAL ABDOMINAL LIGATION           History reviewed. No pertinent family history.      Social History     Socioeconomic History    Marital status:    Tobacco Use    Smoking status:  "Every Day     Packs/day: 0.50     Types: Cigarettes   Vaping Use    Vaping Use: Never used   Substance and Sexual Activity    Drug use: Yes     Types: Marijuana         Prior to Admission medications    Medication Sig Start Date End Date Taking? Authorizing Provider   hydroCHLOROthiazide (HYDRODIURIL) 25 MG tablet Take 1 tablet by mouth Daily.    ProviderSteven MD   multivitamin with minerals tablet tablet Take 1 tablet by mouth Daily.    ProviderSteven MD         OBJECTIVE    /85 (BP Location: Right arm, Patient Position: Lying)   Pulse 80   Temp 98 °F (36.7 °C) (Oral)   Resp 16   Ht 180.3 cm (71\")   Wt 56.4 kg (124 lb 5.4 oz)   SpO2 100%   BMI 17.34 kg/m²       Review of Systems     Constitutional:  Denies recent weight loss, weight gain, fever or chills, no change in exercise tolerance     HENT:  Denies any hearing loss, epistaxis, hoarseness, or difficulty speaking.     Eyes: Wears eyeglasses or contact lenses     Respiratory:  sob    Cardiovascular: chest pain    Gastrointestinal:  Denies change in bowel habits, dyspepsia, ulcer disease, hematochezia, or melena.     Endocrine: Negative for cold intolerance, heat intolerance, polydipsia, polyphagia and polyuria. Denies any history of weight change, heat/cold intolerance, polydipsia, polyuria     Genitourinary: Negative.      Musculoskeletal: Denies any history of arthritic symptoms or back problems     Skin:  Denies any change in hair or nails, rashes, or skin lesions.     Allergic/Immunologic: Negative.  Negative for environmental allergies, food allergies and immunocompromised state.     Neurological:  Denies any history of recurrent headaches, strokes, TIA, or seizure disorder.     Hematological: Denies any food allergies, seasonal allergies, bleeding disorders, or lymphadenopathy.     Psychiatric/Behavioral: Denies any history of depression, substance abuse, or change in cognitive function.       Physical Exam     Constitutional: " Cooperative, alert and oriented, well-developed, well-nourished, in no acute distress.     HENT:   Head: Normocephalic, normal hair patterns, no masses or tenderness.  Ears, Nose, and Throat: No gross abnormalities. No pallor or cyanosis. Dentition good.   Eyes: EOMS intact, PERRL, conjunctivae and lids unremarkable. Fundoscopic exam and visual fields not performed.   Neck: No palpable masses or adenopathy, no thyromegaly, no JVD, carotid pulses are full and equal bilaterally and without  Bruits.     Cardiovascular: Regular rhythm, S1 and S2 normal, no S3 or S4. Apical impulse not displaced. No murmurs, gallops, or rubs detected.     Pulmonary/Chest: Chest: wheezing      Abdominal: Abdomen soft, bowel sounds normoactive, no masses, no hepatosplenomegaly, non-tender, no bruits.     Musculoskeletal: No deformities, clubbing, cyanosis, erythema, or edema observed. There are no spinal abnormalities noted. Normal muscle strength and tone. Pulses full and equal in all extremities, no bruits auscultated.     Neurological: No gross motor or sensory deficits noted, Cranial nerves 2-12 normal. affect appropriate, oriented to time, person, place.     Skin: Warm and dry to the touch, no apparent skin lesions or masses noted.     Psychiatric: Normal mood and affect. Behavior is normal. Judgment and thought content normal.     RESULTS  Lab Results (last 24 hours)       Procedure Component Value Units Date/Time    High Sensitivity Troponin T 2Hr [[id number]]  (Abnormal) Collected: 07/21/23 0939    Specimen: Blood Updated: 07/21/23 1020     HS Troponin T 179 ng/L      Troponin T Delta -7 ng/L     Narrative:      High Sensitive Troponin T Reference Range:  <10.0 ng/L- Negative Female for AMI  <15.0 ng/L- Negative Male for AMI  >=10 - Abnormal Female indicating possible myocardial injury.  >=15 - Abnormal Male indicating possible myocardial injury.   Clinicians would have to utilize clinical acumen, EKG, Troponin, and serial changes  to determine if it is an Acute Myocardial Infarction or myocardial injury due to an underlying chronic condition.         STAT Lactic Acid, Reflex [[id number]]  (Abnormal) Collected: 07/21/23 0939    Specimen: Blood Updated: 07/21/23 1018     Lactate 2.3 mmol/L      Comment: Verified by repeat analysis.       Extra Tubes [[id number]] Collected: 07/21/23 0939    Specimen: Blood, Venous Line Updated: 07/21/23 1001    Narrative:      The following orders were created for panel order Extra Tubes.  Procedure                               Abnormality         Status                     ---------                               -----------         ------                     Lavender Top[[id number]]                                     Final result               Light Blue Top[[id number]]                                   Final result                 Please view results for these tests on the individual orders.    Lavender Top [[id number]] Collected: 07/21/23 0939    Specimen: Blood Updated: 07/21/23 1001     Extra Tube hold for add-on     Comment: Auto resulted       Light Blue Top [[id number]] Collected: 07/21/23 0939    Specimen: Blood Updated: 07/21/23 1001     Extra Tube Hold for add-ons.     Comment: Auto resulted       Influenza Antigen, Rapid - Swab, Nasopharynx [[id number]]  (Normal) Collected: 07/21/23 0848    Specimen: Swab from Nasopharynx Updated: 07/21/23 0944     Influenza A Ag, EIA Negative     Influenza B Ag, EIA Negative    POC Glucose Once [[id number]]  (Abnormal) Collected: 07/21/23 0932    Specimen: Blood Updated: 07/21/23 0933     Glucose 144 mg/dL      Comment: Serial Number: [id number]:  [id number]       High Sensitivity Troponin T [[id number]]  (Abnormal) Collected: 07/21/23 0807    Specimen: Blood Updated: 07/21/23 0854     HS Troponin T 186 ng/L     Narrative:      High Sensitive Troponin T Reference Range:  <10.0 ng/L- Negative Female for AMI  <15.0 ng/L- Negative Male for AMI  >=10 - Abnormal Female  "indicating possible myocardial injury.  >=15 - Abnormal Male indicating possible myocardial injury.   Clinicians would have to utilize clinical acumen, EKG, Troponin, and serial changes to determine if it is an Acute Myocardial Infarction or myocardial injury due to an underlying chronic condition.         STAT Lactic Acid, Reflex [[id number]]  (Normal) Collected: 07/21/23 0807    Specimen: Blood Updated: 07/21/23 0849     Lactate 2.0 mmol/L     Procalcitonin [[id number]]  (Normal) Collected: 07/21/23 0125    Specimen: Blood Updated: 07/21/23 0743     Procalcitonin 0.02 ng/mL     Narrative:      As a Marker for Sepsis (Non-Neonates):    1. <0.5 ng/mL represents a low risk of severe sepsis and/or septic shock.  2. >2 ng/mL represents a high risk of severe sepsis and/or septic shock.    As a Marker for Lower Respiratory Tract Infections that require antibiotic therapy:    PCT on Admission    Antibiotic Therapy       6-12 Hrs later    >0.5                Strongly Recommended  >0.25 - <0.5        Recommended  0.1 - 0.25          Discouraged              Remeasure/reassess PCT  <0.1                Strongly Discouraged     Remeasure/reassess PCT    As 28 day mortality risk marker: \"Change in Procalcitonin Result\" (>80% or <=80%) if Day 0 (or Day 1) and Day 4 values are available. Refer to http://www.[facility]-pct-calculator.com    Change in PCT <=80%  A decrease of PCT levels below or equal to 80% defines a positive change in PCT test result representing a higher risk for 28-day all-cause mortality of patients diagnosed with severe sepsis for septic shock.    Change in PCT >80%  A decrease of PCT levels of more than 80% defines a negative change in PCT result representing a lower risk for 28-day all-cause mortality of patients diagnosed with severe sepsis or septic shock.    This test is Prognostic not Diagnostic, if elevated correlate with clinical findings before administering antibiotic treatment.        Magnesium " [[id number]]  (Normal) Collected: 07/21/23 0125    Specimen: Blood Updated: 07/21/23 0237     Magnesium 1.6 mg/dL     Lactic Acid, Plasma [[id number]]  (Abnormal) Collected: 07/21/23 0125    Specimen: Blood Updated: 07/21/23 0232     Lactate 2.2 mmol/L     [place] Draw [[id number]] Collected: 07/21/23 0125    Specimen: Blood Updated: 07/21/23 0230    Narrative:      The following orders were created for panel order [place] Draw.  Procedure                               Abnormality         Status                     ---------                               -----------         ------                     Green Top (Gel)[[id number]]                                  Final result               Lavender Top[[id number]]                                     Final result               Gold Top - SST[[id number]]                                   Final result               Light Blue Top[[id number]]                                   Final result                 Please view results for these tests on the individual orders.    Green Top (Gel) [[id number]] Collected: 07/21/23 0125    Specimen: Blood Updated: 07/21/23 0230     Extra Tube Hold for add-ons.     Comment: Auto resulted.       Gold Top - SST [[id number]] Collected: 07/21/23 0125    Specimen: Blood Updated: 07/21/23 0230     Extra Tube Hold for add-ons.     Comment: Auto resulted.       Light Blue Top [[id number]] Collected: 07/21/23 0125    Specimen: Blood Updated: 07/21/23 0230     Extra Tube Hold for add-ons.     Comment: Auto resulted       Lavender Top [[id number]] Collected: 07/21/23 0125    Specimen: Blood Updated: 07/21/23 0230     Extra Tube hold for add-on     Comment: Auto resulted       Comprehensive Metabolic Panel [[id number]]  (Abnormal) Collected: 07/21/23 0125    Specimen: Blood Updated: 07/21/23 0221     Glucose 126 mg/dL      BUN 9 mg/dL      Creatinine 0.56 mg/dL      Sodium 143 mmol/L      Potassium 2.9 mmol/L      Chloride 100 mmol/L      CO2 28.5 mmol/L       Calcium 9.4 mg/dL      Total Protein 6.7 g/dL      Albumin 4.4 g/dL      ALT (SGPT) 16 U/L      AST (SGOT) 19 U/L      Alkaline Phosphatase 64 U/L      Total Bilirubin 0.4 mg/dL      Globulin 2.3 gm/dL      A/G Ratio 1.9 g/dL      BUN/Creatinine Ratio 16.1     Anion Gap 14.5 mmol/L      eGFR 103.3 mL/min/1.73     Narrative:      GFR Normal >60  Chronic Kidney Disease <60  Kidney Failure <15      Single High Sensitivity Troponin T [[id number]]  (Abnormal) Collected: 07/21/23 0125    Specimen: Blood Updated: 07/21/23 0221     HS Troponin T 33 ng/L     Narrative:      High Sensitive Troponin T Reference Range:  <10.0 ng/L- Negative Female for AMI  <15.0 ng/L- Negative Male for AMI  >=10 - Abnormal Female indicating possible myocardial injury.  >=15 - Abnormal Male indicating possible myocardial injury.   Clinicians would have to utilize clinical acumen, EKG, Troponin, and serial changes to determine if it is an Acute Myocardial Infarction or myocardial injury due to an underlying chronic condition.         BNP [[id number]]  (Normal) Collected: 07/21/23 0125    Specimen: Blood Updated: 07/21/23 0220     proBNP 176.1 pg/mL     Narrative:      Among patients with dyspnea, NT-proBNP is highly sensitive for the detection of acute congestive heart failure. In addition NT-proBNP of <300 pg/ml effectively rules out acute congestive heart failure with 99% negative predictive value.      Manual Differential [[id number]]  (Abnormal) Collected: 07/21/23 0125    Specimen: Blood Updated: 07/21/23 0158     Neutrophil % 29.0 %      Lymphocyte % 57.0 %      Monocyte % 6.0 %      Eosinophil % 8.0 %      Neutrophils Absolute 3.78 10*3/mm3      Lymphocytes Absolute 7.42 10*3/mm3      Monocytes Absolute 0.78 10*3/mm3      Eosinophils Absolute 1.04 10*3/mm3      RBC Morphology Normal     WBC Morphology Normal     Platelet Morphology Normal    CBC & Differential [[id number]]  (Abnormal) Collected: 07/21/23 0125    Specimen: Blood Updated:  07/21/23 0158    Narrative:      The following orders were created for panel order CBC & Differential.  Procedure                               Abnormality         Status                     ---------                               -----------         ------                     CBC Auto Differential[[id number]]        Abnormal            Final result               Scan Slide[[id number]]                                       Final result                 Please view results for these tests on the individual orders.    CBC Auto Differential [[id number]]  (Abnormal) Collected: 07/21/23 0125    Specimen: Blood Updated: 07/21/23 0158     WBC 13.02 10*3/mm3      RBC 4.65 10*6/mm3      Hemoglobin 13.9 g/dL      Hematocrit 40.5 %      MCV 87.1 fL      MCH 29.9 pg      MCHC 34.3 g/dL      RDW 15.9 %      RDW-SD 50.4 fl      MPV 9.6 fL      Platelets 298 10*3/mm3     Scan Slide [[id number]] Collected: 07/21/23 0125    Specimen: Blood Updated: 07/21/23 0158     Scan Slide --     Comment: See Manual Differential Results       Blood Culture - Blood, Arm, Right [[id number]] Collected: 07/21/23 0127    Specimen: Blood from Arm, Right Updated: 07/21/23 0133    Blood Culture - Blood, Arm, Right [[id number]] Collected: 07/21/23 0125    Specimen: Blood from Arm, Right Updated: 07/21/23 0133          Imaging Results (Last 24 Hours)       Procedure Component Value Units Date/Time    XR Chest 1 View [[id number]] Collected: 07/21/23 0145     Updated: 07/21/23 0148    Narrative:      PROCEDURE: XR CHEST 1 VW     COMPARISON: 9/17/2020.     INDICATIONS: Shortness of breath.     FINDINGS:  A single AP (or PA) upright portable chest radiograph was performed.  No cardiac   enlargement is seen.  No acute infiltrate is appreciated.  No pleural effusion or pneumothorax is   identified.  External artifacts obscure detail.  The thoracic aorta is mildly atherosclerotic.  No   significant interval change is seen since the prior study (or studies).        Impression:        No acute infiltrate is appreciated.              Please note that portions of this note were completed with a voice recognition program.     ANNETTE FOWLER JR, MD         Electronically Signed and Approved By: ANNETTE FOWLER JR, MD on 7/21/2023 at 1:45                                  ASSESSMENT AND PLAN    A/c resp failure  COPD  Smoker  NSTEMI      PLAN:    Continue antibiotics  CTA chest   ECHo for EF  Will need Ischaemic work up once resp status improved  Replace potassium      D/w pt and primary      Nicole Marx MD  7/21/2023  11:09 EDT

## 2023-07-21 NOTE — CONSULTS
"Nutrition Services    Patient Name: Gloria Sunshine  YOB: 1960  MRN: [id number]  Admission date: 7/21/2023      CLINICAL NUTRITION ASSESSMENT      Reason for Assessment  Identified at risk by screening criteria, BMI     H&P:    Past Medical History:   Diagnosis Date    Hypertension         Current Problems:   Active Hospital Problems    Diagnosis     **Acute respiratory failure with hypoxia     COPD exacerbation         Nutrition/Diet History         Narrative     Pt followed by RD for low BMI. Pt is at low risk per nutrition risk screening.   NFPE conducted by RD without significant findings.    Pt is typically 130-138# reports some recent wt loss following a cruise to [place].    Has had wt loss (5.5 #) in past 30 days however pt states her appetite is not the issue.  She has coughed so much that it makes her throw up.    Discussed ONS and pt indicated once diet is advanced she would be willing to drink ensure vanilla with meals.     No acute nutrition concerns or interventions at this time. RD will continue to follow and monitor per protocol.     Anthropometrics        Current Height, Weight Height: 180.3 cm (71\")  Weight: 56.4 kg (124 lb 5.4 oz)   Current BMI Body mass index is 17.34 kg/m².       Weight Hx  Wt Readings from Last 30 Encounters:   07/21/23 0112 56.4 kg (124 lb 5.4 oz)            Wt Change Observation Wt down 5.5# in past month     Estimated/Assessed Needs       Energy Requirements Est needs using IBW   EST Needs (kcal/day) 30 kcal/kg = 2100 calories       Protein Requirements    EST Daily Needs (g/day) 1.0 g/kg = 70 grams       Fluid Requirements     Estimated Needs (mL/day) 30 ml/kg = 2100 ml (8-9 cups)     Labs/Medications         Pertinent Labs Reviewed.   Results from last 7 days   Lab Units 07/21/23  0125   SODIUM mmol/L 143   POTASSIUM mmol/L 2.9*   CHLORIDE mmol/L 100   CO2 mmol/L 28.5   BUN mg/dL 9   CREATININE mg/dL 0.56*   CALCIUM mg/dL 9.4   BILIRUBIN mg/dL 0.4 "   ALK PHOS U/L 64   ALT (SGPT) U/L 16   AST (SGOT) U/L 19   GLUCOSE mg/dL 126*     Results from last 7 days   Lab Units 07/21/23  0125   MAGNESIUM mg/dL 1.6   HEMOGLOBIN g/dL 13.9   HEMATOCRIT % 40.5     No results found for: COVID19  No results found for: HGBA1C      Pertinent Medications Reviewed.     Current Nutrition Orders & Evaluation of Intake       Oral Nutrition     Current PO Diet NPO Diet NPO Type: Strict NPO   Supplement No active supplement orders       Malnutrition Severity Assessment                Nutrition Diagnosis         Nutrition Dx Problem 1 No nutrition diagnosis at this time.       Nutrition Intervention         No intervention indicated.      Medical Nutrition Therapy/Nutrition Education          Learner     Readiness PT  Acceptance     Method     Response Explanation  Voices understanding     Monitor/Evaluation        Monitor Per protocol.       Nutrition Discharge Plan         No nutrition needs identified at this time.       Electronically signed by:  Fay Potter RD  07/21/23 13:32 EDT

## 2023-07-21 NOTE — NURSING NOTE
RN walked by patients room, patient was bent over on side of the bed holding her chest. Patient stated she was having severe chest pain that radiated to the left shoulder and down the left arm. RRT called, vital signs and blood glucose obtained. BP was hypertensive. EKG was completed and troponin was drawn. Nitro SL given x1, after few minutes, patient stated chest pain had resided. Cardiologist came to bedside, see orders. Report called to Nery on 3W. Patient transferred to room 371-1.

## 2023-07-22 ENCOUNTER — APPOINTMENT (OUTPATIENT)
Dept: CARDIOLOGY | Facility: HOSPITAL | Age: 63
DRG: 190 | End: 2023-07-22
Payer: COMMERCIAL

## 2023-07-22 LAB
ALBUMIN SERPL-MCNC: 3.9 G/DL (ref 3.5–5.2)
ANION GAP SERPL CALCULATED.3IONS-SCNC: 11.6 MMOL/L (ref 5–15)
BASOPHILS # BLD AUTO: 0.03 10*3/MM3 (ref 0–0.2)
BASOPHILS NFR BLD AUTO: 0.2 % (ref 0–1.5)
BH CV ECHO MEAS - AO ROOT DIAM: 3 CM
BH CV ECHO MEAS - EDV(CUBED): 83.5 ML
BH CV ECHO MEAS - EDV(MOD-SP2): 49.1 ML
BH CV ECHO MEAS - EDV(MOD-SP4): 32.8 ML
BH CV ECHO MEAS - EF(MOD-BP): 51.9 %
BH CV ECHO MEAS - EF(MOD-SP2): 64.6 %
BH CV ECHO MEAS - EF(MOD-SP4): 41.8 %
BH CV ECHO MEAS - ESV(CUBED): 23.6 ML
BH CV ECHO MEAS - ESV(MOD-SP2): 17.4 ML
BH CV ECHO MEAS - ESV(MOD-SP4): 19.1 ML
BH CV ECHO MEAS - FS: 34.3 %
BH CV ECHO MEAS - IVS/LVPW: 0.89 CM
BH CV ECHO MEAS - IVSD: 0.87 CM
BH CV ECHO MEAS - LA DIMENSION: 2.8 CM
BH CV ECHO MEAS - LAT PEAK E' VEL: 6 CM/SEC
BH CV ECHO MEAS - LV DIASTOLIC VOL/BSA (35-75): 19.1 CM2
BH CV ECHO MEAS - LV MASS(C)D: 130.3 GRAMS
BH CV ECHO MEAS - LV SYSTOLIC VOL/BSA (12-30): 11.1 CM2
BH CV ECHO MEAS - LVIDD: 4.4 CM
BH CV ECHO MEAS - LVIDS: 2.9 CM
BH CV ECHO MEAS - LVOT AREA: 3.1 CM2
BH CV ECHO MEAS - LVOT DIAM: 2 CM
BH CV ECHO MEAS - LVPWD: 0.97 CM
BH CV ECHO MEAS - MED PEAK E' VEL: 5.3 CM/SEC
BH CV ECHO MEAS - MV A MAX VEL: 46.3 CM/SEC
BH CV ECHO MEAS - MV DEC SLOPE: 366 CM/SEC2
BH CV ECHO MEAS - MV DEC TIME: 0.17 MSEC
BH CV ECHO MEAS - MV E MAX VEL: 61.3 CM/SEC
BH CV ECHO MEAS - MV E/A: 1.32
BH CV ECHO MEAS - RAP SYSTOLE: 5 MMHG
BH CV ECHO MEAS - RVDD: 2.27 CM
BH CV ECHO MEAS - RVSP: 40 MMHG
BH CV ECHO MEAS - SI(MOD-SP2): 18.4 ML/M2
BH CV ECHO MEAS - SI(MOD-SP4): 8 ML/M2
BH CV ECHO MEAS - SV(MOD-SP2): 31.7 ML
BH CV ECHO MEAS - SV(MOD-SP4): 13.7 ML
BH CV ECHO MEAS - TAPSE (>1.6): 1.77 CM
BH CV ECHO MEAS - TR MAX PG: 35 MMHG
BH CV ECHO MEAS - TR MAX VEL: 296 CM/SEC
BH CV ECHO MEASUREMENTS AVERAGE E/E' RATIO: 10.85
BUN SERPL-MCNC: 8 MG/DL (ref 8–23)
BUN/CREAT SERPL: 14.8 (ref 7–25)
CALCIUM SPEC-SCNC: 9.4 MG/DL (ref 8.6–10.5)
CHLORIDE SERPL-SCNC: 105 MMOL/L (ref 98–107)
CO2 SERPL-SCNC: 21.4 MMOL/L (ref 22–29)
CREAT SERPL-MCNC: 0.54 MG/DL (ref 0.57–1)
DEPRECATED RDW RBC AUTO: 55.7 FL (ref 37–54)
EGFRCR SERPLBLD CKD-EPI 2021: 104.2 ML/MIN/1.73
EOSINOPHIL # BLD AUTO: 0.04 10*3/MM3 (ref 0–0.4)
EOSINOPHIL NFR BLD AUTO: 0.2 % (ref 0.3–6.2)
ERYTHROCYTE [DISTWIDTH] IN BLOOD BY AUTOMATED COUNT: 16.6 % (ref 12.3–15.4)
GLUCOSE SERPL-MCNC: 113 MG/DL (ref 65–99)
HCT VFR BLD AUTO: 39.9 % (ref 34–46.6)
HGB BLD-MCNC: 12.9 G/DL (ref 12–15.9)
IMM GRANULOCYTES # BLD AUTO: 0.11 10*3/MM3 (ref 0–0.05)
IMM GRANULOCYTES NFR BLD AUTO: 0.6 % (ref 0–0.5)
LYMPHOCYTES # BLD AUTO: 2.6 10*3/MM3 (ref 0.7–3.1)
LYMPHOCYTES NFR BLD AUTO: 13.1 % (ref 19.6–45.3)
MAGNESIUM SERPL-MCNC: 2 MG/DL (ref 1.6–2.4)
MCH RBC QN AUTO: 30.1 PG (ref 26.6–33)
MCHC RBC AUTO-ENTMCNC: 32.3 G/DL (ref 31.5–35.7)
MCV RBC AUTO: 93.2 FL (ref 79–97)
MONOCYTES # BLD AUTO: 1.45 10*3/MM3 (ref 0.1–0.9)
MONOCYTES NFR BLD AUTO: 7.3 % (ref 5–12)
NEUTROPHILS NFR BLD AUTO: 15.6 10*3/MM3 (ref 1.7–7)
NEUTROPHILS NFR BLD AUTO: 78.6 % (ref 42.7–76)
NRBC BLD AUTO-RTO: 0 /100 WBC (ref 0–0.2)
PHOSPHATE SERPL-MCNC: 3.8 MG/DL (ref 2.5–4.5)
PLATELET # BLD AUTO: 253 10*3/MM3 (ref 140–450)
PMV BLD AUTO: 10 FL (ref 6–12)
POTASSIUM SERPL-SCNC: 4.9 MMOL/L (ref 3.5–5.2)
RBC # BLD AUTO: 4.28 10*6/MM3 (ref 3.77–5.28)
SODIUM SERPL-SCNC: 138 MMOL/L (ref 136–145)
WBC NRBC COR # BLD: 19.83 10*3/MM3 (ref 3.4–10.8)

## 2023-07-22 PROCEDURE — 80069 RENAL FUNCTION PANEL: CPT | Performed by: INTERNAL MEDICINE

## 2023-07-22 PROCEDURE — 85025 COMPLETE CBC W/AUTO DIFF WBC: CPT | Performed by: STUDENT IN AN ORGANIZED HEALTH CARE EDUCATION/TRAINING PROGRAM

## 2023-07-22 PROCEDURE — 25010000002 MORPHINE PER 10 MG: Performed by: INTERNAL MEDICINE

## 2023-07-22 PROCEDURE — 94664 DEMO&/EVAL PT USE INHALER: CPT

## 2023-07-22 PROCEDURE — 25010000002 CEFTRIAXONE PER 250 MG: Performed by: STUDENT IN AN ORGANIZED HEALTH CARE EDUCATION/TRAINING PROGRAM

## 2023-07-22 PROCEDURE — 83735 ASSAY OF MAGNESIUM: CPT | Performed by: STUDENT IN AN ORGANIZED HEALTH CARE EDUCATION/TRAINING PROGRAM

## 2023-07-22 PROCEDURE — 25010000002 METHYLPREDNISOLONE PER 125 MG: Performed by: STUDENT IN AN ORGANIZED HEALTH CARE EDUCATION/TRAINING PROGRAM

## 2023-07-22 PROCEDURE — 93306 TTE W/DOPPLER COMPLETE: CPT | Performed by: INTERNAL MEDICINE

## 2023-07-22 PROCEDURE — 25010000002 ONDANSETRON PER 1 MG: Performed by: INTERNAL MEDICINE

## 2023-07-22 PROCEDURE — 99233 SBSQ HOSP IP/OBS HIGH 50: CPT | Performed by: INTERNAL MEDICINE

## 2023-07-22 PROCEDURE — 25010000002 ENOXAPARIN PER 10 MG: Performed by: INTERNAL MEDICINE

## 2023-07-22 PROCEDURE — 93306 TTE W/DOPPLER COMPLETE: CPT

## 2023-07-22 PROCEDURE — 94799 UNLISTED PULMONARY SVC/PX: CPT

## 2023-07-22 PROCEDURE — 99232 SBSQ HOSP IP/OBS MODERATE 35: CPT | Performed by: INTERNAL MEDICINE

## 2023-07-22 RX ORDER — CHOLECALCIFEROL (VITAMIN D3) 125 MCG
10 CAPSULE ORAL NIGHTLY PRN
Status: DISCONTINUED | OUTPATIENT
Start: 2023-07-22 | End: 2023-07-25 | Stop reason: HOSPADM

## 2023-07-22 RX ORDER — ASPIRIN 81 MG/1
81 TABLET ORAL DAILY
Status: DISCONTINUED | OUTPATIENT
Start: 2023-07-22 | End: 2023-07-25 | Stop reason: HOSPADM

## 2023-07-22 RX ORDER — ATORVASTATIN CALCIUM 40 MG/1
40 TABLET, FILM COATED ORAL NIGHTLY
Status: DISCONTINUED | OUTPATIENT
Start: 2023-07-22 | End: 2023-07-25 | Stop reason: HOSPADM

## 2023-07-22 RX ORDER — BENZONATATE 100 MG/1
100 CAPSULE ORAL 3 TIMES DAILY PRN
Status: DISCONTINUED | OUTPATIENT
Start: 2023-07-22 | End: 2023-07-25 | Stop reason: HOSPADM

## 2023-07-22 RX ORDER — PREDNISONE 20 MG/1
40 TABLET ORAL
Status: DISCONTINUED | OUTPATIENT
Start: 2023-07-23 | End: 2023-07-25 | Stop reason: HOSPADM

## 2023-07-22 RX ADMIN — IPRATROPIUM BROMIDE AND ALBUTEROL SULFATE 3 ML: 2.5; .5 SOLUTION RESPIRATORY (INHALATION) at 06:33

## 2023-07-22 RX ADMIN — IPRATROPIUM BROMIDE AND ALBUTEROL SULFATE 3 ML: 2.5; .5 SOLUTION RESPIRATORY (INHALATION) at 04:03

## 2023-07-22 RX ADMIN — ONDANSETRON 4 MG: 2 INJECTION INTRAMUSCULAR; INTRAVENOUS at 00:40

## 2023-07-22 RX ADMIN — GUAIFENESIN 600 MG: 600 TABLET ORAL at 08:33

## 2023-07-22 RX ADMIN — Medication 10 MG: at 23:10

## 2023-07-22 RX ADMIN — CEFTRIAXONE SODIUM 1000 MG: 1 INJECTION, SOLUTION INTRAVENOUS at 05:13

## 2023-07-22 RX ADMIN — BENZONATATE 100 MG: 100 CAPSULE ORAL at 05:13

## 2023-07-22 RX ADMIN — IPRATROPIUM BROMIDE AND ALBUTEROL SULFATE 3 ML: 2.5; .5 SOLUTION RESPIRATORY (INHALATION) at 11:12

## 2023-07-22 RX ADMIN — IPRATROPIUM BROMIDE AND ALBUTEROL SULFATE 3 ML: 2.5; .5 SOLUTION RESPIRATORY (INHALATION) at 00:53

## 2023-07-22 RX ADMIN — NICOTINE 1 PATCH: 21 PATCH, EXTENDED RELEASE TRANSDERMAL at 08:36

## 2023-07-22 RX ADMIN — AZITHROMYCIN DIHYDRATE 500 MG: 250 TABLET ORAL at 05:13

## 2023-07-22 RX ADMIN — IPRATROPIUM BROMIDE AND ALBUTEROL SULFATE 3 ML: 2.5; .5 SOLUTION RESPIRATORY (INHALATION) at 23:41

## 2023-07-22 RX ADMIN — MORPHINE SULFATE 2 MG: 2 INJECTION, SOLUTION INTRAMUSCULAR; INTRAVENOUS at 10:57

## 2023-07-22 RX ADMIN — GUAIFENESIN 600 MG: 600 TABLET ORAL at 23:11

## 2023-07-22 RX ADMIN — IPRATROPIUM BROMIDE AND ALBUTEROL SULFATE 3 ML: 2.5; .5 SOLUTION RESPIRATORY (INHALATION) at 19:12

## 2023-07-22 RX ADMIN — METHYLPREDNISOLONE SODIUM SUCCINATE 62.5 MG: 125 INJECTION INTRAMUSCULAR; INTRAVENOUS at 08:33

## 2023-07-22 RX ADMIN — ENOXAPARIN SODIUM 60 MG: 60 INJECTION SUBCUTANEOUS at 10:57

## 2023-07-22 RX ADMIN — BENZONATATE 100 MG: 100 CAPSULE ORAL at 17:17

## 2023-07-22 RX ADMIN — ENOXAPARIN SODIUM 60 MG: 60 INJECTION SUBCUTANEOUS at 23:10

## 2023-07-22 RX ADMIN — ATORVASTATIN CALCIUM 40 MG: 40 TABLET, FILM COATED ORAL at 23:11

## 2023-07-22 RX ADMIN — Medication 10 ML: at 23:10

## 2023-07-22 RX ADMIN — METOPROLOL SUCCINATE 25 MG: 25 TABLET, EXTENDED RELEASE ORAL at 08:33

## 2023-07-22 RX ADMIN — IPRATROPIUM BROMIDE AND ALBUTEROL SULFATE 3 ML: 2.5; .5 SOLUTION RESPIRATORY (INHALATION) at 16:06

## 2023-07-22 RX ADMIN — ASPIRIN 81 MG: 81 TABLET, COATED ORAL at 14:46

## 2023-07-22 NOTE — PLAN OF CARE
Goal Outcome Evaluation:  Plan of Care Reviewed With: patient        Progress: no change  Outcome Evaluation: patient arrived to unit from [facility], no complaints at this time, oriented to floor, continue present plan of care.

## 2023-07-22 NOTE — PROGRESS NOTES
CARDIOLOGY PROGRESS NOTE      LOS: 1 day   Patient Care Team:  Provider, No Known as PCP - General    Problems/Diagnoses: copd/abn troponin/smoker    Subjective     Interval History:feel sbetter. No chest pain. CTA chest . No PE    Review of Systems:     Constitutional:  Denies recent weight loss, weight gain, fever or chills, no change in exercise tolerance     HENT:  Denies any hearing loss, epistaxis, hoarseness, or difficulty speaking.     Eyes: Wears eyeglasses or contact lenses     Respiratory:  Denies dyspnea with exertion,no cough, wheezing, or hemoptysis.     Cardiovascular: Negative for palpations, chest pain, orthopnea, PND, peripheral edema, syncope, or claudication.     Gastrointestinal:  Denies change in bowel habits, dyspepsia, ulcer disease, hematochezia, or melena.     Endocrine: Negative for cold intolerance, heat intolerance, polydipsia, polyphagia and polyuria. Denies any history of weight change, heat/cold intolerance, polydipsia, polyuria     Genitourinary: Negative.      Musculoskeletal: Denies any history of arthritic symptoms or back problems     Skin:  Denies any change in hair or nails, rashes, or skin lesions.     Allergic/Immunologic: Negative.  Negative for environmental allergies, food allergies and immunocompromised state.     Neurological:  Denies any history of recurrent headaches, strokes, TIA, or seizure disorder.     Hematological: Denies any food allergies, seasonal allergies, bleeding disorders, or lymphadenopathy.     Psychiatric/Behavioral: Denies any history of depression, substance abuse, or change in cognitive function.     Objective     Vital Signs  Temp:  [97.6 °F (36.4 °C)-98.7 °F (37.1 °C)] 98.4 °F (36.9 °C)  Heart Rate:  [56-88] 69  Resp:  [15-22] 20  BP: (107-144)/(63-96) 107/76    Physical Exam:    Constitutional: Cooperative, alert and oriented, well-developed, well-nourished, in no acute distress.     HENT:   Head: Normocephalic, normal hair patterns, no masses or  tenderness.  Ears, Nose, and Throat: No gross abnormalities. No pallor or cyanosis. Dentition good.   Eyes: EOMS intact, PERRL, conjunctivae and lids unremarkable. Fundoscopic exam and visual fields not performed.   Neck: No palpable masses or adenopathy, no thyromegaly, no JVD, carotid pulses are full and equal bilaterally and without  Bruits.     Cardiovascular: Regular rhythm, S1 and S2 normal, no S3 or S4. Apical impulse not displaced. No murmurs, gallops, or rubs detected.     Pulmonary/Chest: Chest: normal symmetry, no tenderness to palpation, normal respiratory excursion, no intercostal retraction, no use of accessory muscles.            Pulmonary: Normal breath sounds. No rales or ronchi.    Abdominal: Abdomen soft, bowel sounds normoactive, no masses, no hepatosplenomegaly, non-tender, no bruits.     Musculoskeletal: No deformities, clubbing, cyanosis, erythema, or edema observed. There are no spinal abnormalities noted. Normal muscle strength and tone. Pulses full and equal in all extremities, no bruits auscultated.     Neurological: No gross motor or sensory deficits noted, affect appropriate, oriented to time, person, place.     Skin: Warm and dry to the touch, no apparent skin lesions or masses noted.     Psychiatric: She has a normal mood and affect. Her behavior is normal. Judgment and thought content normal.     Results Review:    Lab Results (last 24 hours)       Procedure Component Value Units Date/Time    Renal Function Panel [[id number]]  (Abnormal) Collected: 07/22/23 0520    Specimen: Blood Updated: 07/22/23 0615     Glucose 113 mg/dL      BUN 8 mg/dL      Creatinine 0.54 mg/dL      Sodium 138 mmol/L      Potassium 4.9 mmol/L      Comment: Slight hemolysis detected by analyzer. Results may be affected.        Chloride 105 mmol/L      CO2 21.4 mmol/L      Calcium 9.4 mg/dL      Albumin 3.9 g/dL      Phosphorus 3.8 mg/dL      Anion Gap 11.6 mmol/L      BUN/Creatinine Ratio 14.8     eGFR 104.2  mL/min/1.73     Narrative:      GFR Normal >60  Chronic Kidney Disease <60  Kidney Failure <15      Magnesium [[id number]]  (Normal) Collected: 07/22/23 0520    Specimen: Blood Updated: 07/22/23 0602     Magnesium 2.0 mg/dL     CBC & Differential [[id number]]  (Abnormal) Collected: 07/22/23 0428    Specimen: Blood Updated: 07/22/23 0450    Narrative:      The following orders were created for panel order CBC & Differential.  Procedure                               Abnormality         Status                     ---------                               -----------         ------                     CBC Auto Differential[[id number]]        Abnormal            Final result                 Please view results for these tests on the individual orders.    CBC Auto Differential [[id number]]  (Abnormal) Collected: 07/22/23 0428    Specimen: Blood Updated: 07/22/23 0450     WBC 19.83 10*3/mm3      RBC 4.28 10*6/mm3      Hemoglobin 12.9 g/dL      Hematocrit 39.9 %      MCV 93.2 fL      MCH 30.1 pg      MCHC 32.3 g/dL      RDW 16.6 %      RDW-SD 55.7 fl      MPV 10.0 fL      Platelets 253 10*3/mm3      Neutrophil % 78.6 %      Lymphocyte % 13.1 %      Monocyte % 7.3 %      Eosinophil % 0.2 %      Basophil % 0.2 %      Immature Grans % 0.6 %      Neutrophils, Absolute 15.60 10*3/mm3      Lymphocytes, Absolute 2.60 10*3/mm3      Monocytes, Absolute 1.45 10*3/mm3      Eosinophils, Absolute 0.04 10*3/mm3      Basophils, Absolute 0.03 10*3/mm3      Immature Grans, Absolute 0.11 10*3/mm3      nRBC 0.0 /100 WBC     Blood Culture - Blood, Arm, Right [[id number]]  (Normal) Collected: 07/21/23 0127    Specimen: Blood from Arm, Right Updated: 07/22/23 0145     Blood Culture No growth at 24 hours    Blood Culture - Blood, Arm, Right [[id number]]  (Normal) Collected: 07/21/23 0125    Specimen: Blood from Arm, Right Updated: 07/22/23 0145     Blood Culture No growth at 24 hours    STAT Lactic Acid, Reflex [[id number]]  (Normal) Collected: 07/21/23  1335    Specimen: Blood from Arm, Right Updated: 07/21/23 1356     Lactate 1.9 mmol/L           Imaging Results (Last 24 Hours)       Procedure Component Value Units Date/Time    CT Chest With Contrast Diagnostic [[id number]] Collected: 07/22/23 0442     Updated: 07/22/23 0445    Narrative:      PROCEDURE: CT CHEST W CONTRAST DIAGNOSTIC     COMPARISON: None.     INDICATIONS: Possible pulmonary embolism (PE); h/o shortness of breath for 2 days.     TECHNIQUE: After obtaining the patient's consent, 706 CT images were obtained with non-ionic   intravenous contrast material.       PROTOCOL:   Standard pulmonary arteriogram CTA imaging protocol performed.      RADIATION:   Total DLP: 57 mGy*cm; total mAs: 1,036.    Automated exposure control was utilized to minimize radiation dose.   CONTRAST: 75 mL Isovue 370 I.V.     FINDINGS:   No pulmonary embolism.  Severe emphysematous changes involve the lungs.  No acute infiltrate is   seen.  There may be pleural-parenchymal predominantly linear scarring, especially in the left lung   base (especially the anterior left lower lobe), such as on image 65 of series 8 and adjacent   images.  The central tracheobronchial tree is well aerated without filling defect.  No pleural or   pericardial effusion.  No thoracic aortic aneurysm.  No cardiac enlargement.  A tiny hiatal hernia   may be present.  No acute findings are seen within the partially imaged upper abdomen.  The   contrast bolus is limited in the thoracic aorta, precluding assessment for thoracic aortic   dissection.  No acute fracture or aggressive osseous lesion is suggested.  No enlarged mediastinal   lymph nodes are seen.  No thyroid enlargement.  There are coronary artery calcifications.       Impression:       No pulmonary embolism is seen.  Severe emphysematous changes involve the lungs.  No   acute infiltrate is identified.  Mild linear pleural-parenchymal scarring involves the anterior,   inferior left lower lobe.           Please note that portions of this note were completed with a voice recognition program.     ANNETTE FOWLER JR, MD         Electronically Signed and Approved By: ANNETTE FOWLER JR, MD on 7/22/2023 at 4:42                                Medication Review:   Current Facility-Administered Medications   Medication Dose Route Frequency Provider Last Rate Last Admin    acetaminophen (TYLENOL) tablet 650 mg  650 mg Oral Q6H PRN Orlin Carreon MD   650 mg at 07/21/23 1234    azithromycin (ZITHROMAX) tablet 500 mg  500 mg Oral Q24H Ike oRman MD   500 mg at 07/22/23 0513    benzonatate (TESSALON) capsule 100 mg  100 mg Oral TID PRN Jessee Pelaez DO   100 mg at 07/22/23 0513    butalbital-acetaminophen-caffeine (ORBIVAN) -40 MG capsule 1 capsule  1 capsule Oral Q4H PRN Orlin Carreon MD   1 capsule at 07/21/23 1735    cefTRIAXone (ROCEPHIN) IVPB 1,000 mg  1,000 mg Intravenous Q24H Ike Roman  mL/hr at 07/22/23 0513 1,000 mg at 07/22/23 0513    Enoxaparin Sodium (LOVENOX) syringe 60 mg  1 mg/kg Subcutaneous Q12H Nicole Marx MD   60 mg at 07/22/23 1057    guaiFENesin (MUCINEX) 12 hr tablet 600 mg  600 mg Oral Q12H Ike Roman MD   600 mg at 07/22/23 0833    ipratropium-albuterol (DUO-NEB) nebulizer solution 3 mL  3 mL Nebulization Q4H - RT Ike Roman MD   3 mL at 07/22/23 1112    methylPREDNISolone sodium succinate (SOLU-Medrol) injection 62.5 mg  62.5 mg Intravenous Daily Ike Roman MD   62.5 mg at 07/22/23 0833    metoprolol succinate XL (TOPROL-XL) 24 hr tablet 25 mg  25 mg Oral Q24H Nicole Marx MD   25 mg at 07/22/23 0833    morphine injection 2 mg  2 mg Intravenous Q4H PRN Orlin Carreon MD   2 mg at 07/22/23 1057    nicotine (NICODERM CQ) 21 MG/24HR patch 1 patch  1 patch Transdermal Q24H Ike Roman MD   1 patch at 07/22/23 0836    nitroglycerin (NITROSTAT) SL tablet 0.4 mg  0.4 mg Sublingual Q5 Min PRN Ike Roman MD   0.4 mg at 07/21/23 0939    ondansetron  (ZOFRAN) injection 4 mg  4 mg Intravenous Q4H PRN Nicole Marx MD   4 mg at 07/22/23 0040    Pharmacy to Dose enoxaparin (LOVENOX)   Does not apply Continuous PRN Orlin Carreon MD        sodium chloride 0.9 % flush 10 mL  10 mL Intravenous PRN Ike Roman MD   10 mL at 07/21/23 5885         Assessment & Plan       Acute respiratory failure with hypoxia    COPD exacerbation  Abn troponin  Smoker      PLAN:    Echo today  Lexiscan Monday  Treat copd exacerbation    D/w pt and family    Nicole Marx MD  07/22/23  11:20 EDT

## 2023-07-22 NOTE — PLAN OF CARE
Goal Outcome Evaluation:      Patient is A&Ox4. VSS. Medicated twice on shift for nausea, see mar. Patient complain of cough on shift causing vomitting, on call provider notified, new orders placed. Medicated once with PRN cough medication. No complaints from patient at this time.

## 2023-07-22 NOTE — PROGRESS NOTES
[facility]   Hospitalist Progress Note  Date: 2023  Patient Name: Gloria Sunshine  : 1960  MRN: [id number]  Date of admission: 2023      Subjective   Subjective     Chief Complaint: Chest pain, SOA    Summary: 62 yo female with HTN presented with a couple of weeks of SOA, cough with some chest pain.  She had started a medrol dosepack and albuterol inhaler as an outpatient but did not improve.  She was admitted with concern for COPD exacerbation and respiratory infection.  Her initial troponin was mildly elevated but she had no significant chest pain.  Repeat increased substantially and she later developed chest pain, diagnosed with NSTEMI and seen by cardiology.    Interval Followup: Feels much better today; no CP, breathing easier, having some nauseas she attributes to meds    Review of Systems   All systems were reviewed and negative except for intermittent nausea    Objective   Objective     Vitals:   Temp:  [97.6 °F (36.4 °C)-98.7 °F (37.1 °C)] 98.4 °F (36.9 °C)  Heart Rate:  [56-88] 77  Resp:  [15-22] 18  BP: (107-144)/(63-96) 107/76  Flow (L/min):  [1] 1  Physical Exam    Constitutional: Awake, alert, mild discomfort   Eyes: Pupils equal, sclerae anicteric, no conjunctival injection   HENT: NCAT, mucous membranes moist   Neck: Supple, no thyromegaly, no lymphadenopathy, trachea midline   Respiratory: better aeration, no wheweze   Cardiovascular: mild tachy no m   Gastrointestinal: Positive bowel sounds, soft, nontender, nondistended   Musculoskeletal: No bilateral ankle edema, no clubbing or cyanosis to extremities   Psychiatric: Appropriate affect, cooperative   Neurologic: Oriented x 3, speech clear   Skin: No rashes     Result Review    Result Review:  I have reviewed the following:  [x]  Laboratory  CBC          2023    01:25 2023    04:28   CBC   WBC 13.02  19.83    RBC 4.65  4.28    Hemoglobin 13.9  12.9    Hematocrit 40.5  39.9    MCV 87.1  93.2    MCH 29.9   30.1    MCHC 34.3  32.3    RDW 15.9  16.6    Platelets 298  253      CMP          7/21/2023    01:25 7/22/2023    05:20   CMP   Glucose 126  113    BUN 9  8    Creatinine 0.56  0.54    EGFR 103.3  104.2    Sodium 143  138    Potassium 2.9  4.9    Chloride 100  105    Calcium 9.4  9.4    Total Protein 6.7     Albumin 4.4  3.9    Globulin 2.3     Total Bilirubin 0.4     Alkaline Phosphatase 64     AST (SGOT) 19     ALT (SGPT) 16     Albumin/Globulin Ratio 1.9     BUN/Creatinine Ratio 16.1  14.8    Anion Gap 14.5  11.6      CARDIAC LABS:      Lab 07/21/23  0939 07/21/23  0807 07/21/23  0125   PROBNP  --   --  176.1   HSTROP T 179* 186* 33*      []  Microbiology  [x]  Radiology  CT Chest With Contrast Diagnostic    Result Date: 7/22/2023   No pulmonary embolism is seen.  Severe emphysematous changes involve the lungs.  No acute infiltrate is identified.  Mild linear pleural-parenchymal scarring involves the anterior, inferior left lower lobe.    Please note that portions of this note were completed with a voice recognition program.  ANNETTE FOWLER JR, MD       Electronically Signed and Approved By: ANNETTE FOWLER JR, MD on 7/22/2023 at 4:42              XR Chest 1 View    Result Date: 7/21/2023    No acute infiltrate is appreciated.     Please note that portions of this note were completed with a voice recognition program.  ANNETTE FOWLER JR, MD       Electronically Signed and Approved By: ANNETTE FOWLER JR, MD on 7/21/2023 at 1:45               [x]  EKG/Telemetry       []  Cardiology/Vascular   []  Patholog  []  Old records  []  Other:    Assessment & Plan   Assessment / Plan     Assessment:  Acute NSTEMI  Acute exacerbation of COPD  Acute bronchitis  Hypertension  Tobacco abuse    Plan:  Telemetry  Aspirin daily  Therapeutic Lovenox  Nitro ordered  Morphine ordered  Echo ordered, pending  Currently relief of CP with ntg  Wean steroids to oral prednisone  Short course abx  Stress test Monday  Appreciate cardiology  input    DVT prophylaxis:  Medical DVT prophylaxis orders are present.    CODE STATUS:   Level Of Support Discussed With: Patient  Code Status (Patient has no pulse and is not breathing): CPR (Attempt to Resuscitate)  Medical Interventions (Patient has pulse or is breathing): Full Support  Release to patient: Routine Release      Electronically signed by Orlin Carreon MD, 07/22/23, 2:30 PM EDT.

## 2023-07-23 LAB
ANION GAP SERPL CALCULATED.3IONS-SCNC: 7.1 MMOL/L (ref 5–15)
BASOPHILS # BLD AUTO: 0.01 10*3/MM3 (ref 0–0.2)
BASOPHILS NFR BLD AUTO: 0.1 % (ref 0–1.5)
BUN SERPL-MCNC: 17 MG/DL (ref 8–23)
BUN/CREAT SERPL: 28.8 (ref 7–25)
CALCIUM SPEC-SCNC: 9.2 MG/DL (ref 8.6–10.5)
CHLORIDE SERPL-SCNC: 105 MMOL/L (ref 98–107)
CO2 SERPL-SCNC: 27.9 MMOL/L (ref 22–29)
CREAT SERPL-MCNC: 0.59 MG/DL (ref 0.57–1)
DEPRECATED RDW RBC AUTO: 53.9 FL (ref 37–54)
EGFRCR SERPLBLD CKD-EPI 2021: 102 ML/MIN/1.73
EOSINOPHIL # BLD AUTO: 0.22 10*3/MM3 (ref 0–0.4)
EOSINOPHIL NFR BLD AUTO: 1.7 % (ref 0.3–6.2)
ERYTHROCYTE [DISTWIDTH] IN BLOOD BY AUTOMATED COUNT: 16.6 % (ref 12.3–15.4)
GLUCOSE SERPL-MCNC: 106 MG/DL (ref 65–99)
HCT VFR BLD AUTO: 34 % (ref 34–46.6)
HGB BLD-MCNC: 11.5 G/DL (ref 12–15.9)
IMM GRANULOCYTES # BLD AUTO: 0.09 10*3/MM3 (ref 0–0.05)
IMM GRANULOCYTES NFR BLD AUTO: 0.7 % (ref 0–0.5)
LYMPHOCYTES # BLD AUTO: 2.9 10*3/MM3 (ref 0.7–3.1)
LYMPHOCYTES NFR BLD AUTO: 21.8 % (ref 19.6–45.3)
MAGNESIUM SERPL-MCNC: 2 MG/DL (ref 1.6–2.4)
MCH RBC QN AUTO: 30.2 PG (ref 26.6–33)
MCHC RBC AUTO-ENTMCNC: 33.8 G/DL (ref 31.5–35.7)
MCV RBC AUTO: 89.2 FL (ref 79–97)
MONOCYTES # BLD AUTO: 1.17 10*3/MM3 (ref 0.1–0.9)
MONOCYTES NFR BLD AUTO: 8.8 % (ref 5–12)
NEUTROPHILS NFR BLD AUTO: 66.9 % (ref 42.7–76)
NEUTROPHILS NFR BLD AUTO: 8.91 10*3/MM3 (ref 1.7–7)
NRBC BLD AUTO-RTO: 0 /100 WBC (ref 0–0.2)
PLATELET # BLD AUTO: 239 10*3/MM3 (ref 140–450)
PMV BLD AUTO: 10.2 FL (ref 6–12)
POTASSIUM SERPL-SCNC: 4.1 MMOL/L (ref 3.5–5.2)
RBC # BLD AUTO: 3.81 10*6/MM3 (ref 3.77–5.28)
SODIUM SERPL-SCNC: 140 MMOL/L (ref 136–145)
WBC NRBC COR # BLD: 13.3 10*3/MM3 (ref 3.4–10.8)

## 2023-07-23 PROCEDURE — 99233 SBSQ HOSP IP/OBS HIGH 50: CPT | Performed by: INTERNAL MEDICINE

## 2023-07-23 PROCEDURE — 94664 DEMO&/EVAL PT USE INHALER: CPT

## 2023-07-23 PROCEDURE — 25010000002 CEFTRIAXONE PER 250 MG: Performed by: STUDENT IN AN ORGANIZED HEALTH CARE EDUCATION/TRAINING PROGRAM

## 2023-07-23 PROCEDURE — 85025 COMPLETE CBC W/AUTO DIFF WBC: CPT | Performed by: STUDENT IN AN ORGANIZED HEALTH CARE EDUCATION/TRAINING PROGRAM

## 2023-07-23 PROCEDURE — 83735 ASSAY OF MAGNESIUM: CPT | Performed by: STUDENT IN AN ORGANIZED HEALTH CARE EDUCATION/TRAINING PROGRAM

## 2023-07-23 PROCEDURE — 94799 UNLISTED PULMONARY SVC/PX: CPT

## 2023-07-23 PROCEDURE — 25010000002 MORPHINE PER 10 MG: Performed by: INTERNAL MEDICINE

## 2023-07-23 PROCEDURE — 63710000001 PREDNISONE PER 1 MG: Performed by: INTERNAL MEDICINE

## 2023-07-23 PROCEDURE — 99232 SBSQ HOSP IP/OBS MODERATE 35: CPT | Performed by: INTERNAL MEDICINE

## 2023-07-23 PROCEDURE — 25010000002 ENOXAPARIN PER 10 MG: Performed by: INTERNAL MEDICINE

## 2023-07-23 PROCEDURE — 80048 BASIC METABOLIC PNL TOTAL CA: CPT | Performed by: STUDENT IN AN ORGANIZED HEALTH CARE EDUCATION/TRAINING PROGRAM

## 2023-07-23 RX ORDER — HYDROCODONE POLISTIREX AND CHLORPHENIRAMINE POLISTIREX 10; 8 MG/5ML; MG/5ML
5 SUSPENSION, EXTENDED RELEASE ORAL 2 TIMES DAILY
Status: DISCONTINUED | OUTPATIENT
Start: 2023-07-23 | End: 2023-07-25 | Stop reason: HOSPADM

## 2023-07-23 RX ADMIN — HYDROCODONE POLISTIREX AND CHLORPHENIRAMINE POLISTIREX 5 ML: 10; 8 SUSPENSION, EXTENDED RELEASE ORAL at 12:21

## 2023-07-23 RX ADMIN — METOPROLOL SUCCINATE 25 MG: 25 TABLET, EXTENDED RELEASE ORAL at 09:02

## 2023-07-23 RX ADMIN — PREDNISONE 40 MG: 20 TABLET ORAL at 09:02

## 2023-07-23 RX ADMIN — CEFTRIAXONE SODIUM 1000 MG: 1 INJECTION, SOLUTION INTRAVENOUS at 05:21

## 2023-07-23 RX ADMIN — IPRATROPIUM BROMIDE AND ALBUTEROL SULFATE 3 ML: 2.5; .5 SOLUTION RESPIRATORY (INHALATION) at 13:21

## 2023-07-23 RX ADMIN — IPRATROPIUM BROMIDE AND ALBUTEROL SULFATE 3 ML: 2.5; .5 SOLUTION RESPIRATORY (INHALATION) at 19:10

## 2023-07-23 RX ADMIN — IPRATROPIUM BROMIDE AND ALBUTEROL SULFATE 3 ML: 2.5; .5 SOLUTION RESPIRATORY (INHALATION) at 08:03

## 2023-07-23 RX ADMIN — HYDROCODONE POLISTIREX AND CHLORPHENIRAMINE POLISTIREX 5 ML: 10; 8 SUSPENSION, EXTENDED RELEASE ORAL at 20:30

## 2023-07-23 RX ADMIN — ENOXAPARIN SODIUM 60 MG: 60 INJECTION SUBCUTANEOUS at 09:07

## 2023-07-23 RX ADMIN — BENZONATATE 100 MG: 100 CAPSULE ORAL at 01:34

## 2023-07-23 RX ADMIN — ENOXAPARIN SODIUM 60 MG: 60 INJECTION SUBCUTANEOUS at 21:21

## 2023-07-23 RX ADMIN — ATORVASTATIN CALCIUM 40 MG: 40 TABLET, FILM COATED ORAL at 20:30

## 2023-07-23 RX ADMIN — GUAIFENESIN 600 MG: 600 TABLET ORAL at 20:30

## 2023-07-23 RX ADMIN — AZITHROMYCIN DIHYDRATE 500 MG: 250 TABLET ORAL at 05:21

## 2023-07-23 RX ADMIN — Medication 10 MG: at 20:38

## 2023-07-23 RX ADMIN — ASPIRIN 81 MG: 81 TABLET, COATED ORAL at 09:02

## 2023-07-23 RX ADMIN — MORPHINE SULFATE 2 MG: 2 INJECTION, SOLUTION INTRAMUSCULAR; INTRAVENOUS at 09:07

## 2023-07-23 RX ADMIN — GUAIFENESIN 600 MG: 600 TABLET ORAL at 09:02

## 2023-07-23 RX ADMIN — NICOTINE 1 PATCH: 21 PATCH, EXTENDED RELEASE TRANSDERMAL at 09:03

## 2023-07-23 NOTE — PLAN OF CARE
Goal Outcome Evaluation:  Plan of Care Reviewed With: patient        Progress: no change  Outcome Evaluation: No acute changes throughout shift today, vss, patient c/o pain treated per MAR, cough supressant PRN given. patient remains up ad sumaya, will continue present plan of care.

## 2023-07-23 NOTE — PROGRESS NOTES
CARDIOLOGY PROGRESS NOTE      LOS: 2 days   Patient Care Team:  Provider, No Known as PCP - General    Problems/Diagnoses: copd/smoker/abn troponin    Subjective     Interval History: no new c/o    Review of Systems:   No chest pain  Objective     Vital Signs  Temp:  [97.3 °F (36.3 °C)-98.6 °F (37 °C)] 98.6 °F (37 °C)  Heart Rate:  [56-78] 62  Resp:  [18-20] 18  BP: (101-150)/(59-94) 129/89    Physical Exam:    Constitutional: Cooperative, alert and oriented, well-developed, well-nourished, in no acute distress.     HENT:   Head: Normocephalic, normal hair patterns, no masses or tenderness.  Ears, Nose, and Throat: No gross abnormalities. No pallor or cyanosis. Dentition good.   Eyes: EOMS intact, PERRL, conjunctivae and lids unremarkable. Fundoscopic exam and visual fields not performed.   Neck: No palpable masses or adenopathy, no thyromegaly, no JVD, carotid pulses are full and equal bilaterally and without  Bruits.     Cardiovascular: Regular rhythm, S1 and S2 normal, no S3 or S4. Apical impulse not displaced. No murmurs, gallops, or rubs detected.     Pulmonary/Chest: Chest: normal symmetry, no tenderness to palpation, normal respiratory excursion, no intercostal retraction, no use of accessory muscles.            Pulmonary: Normal breath sounds. No rales or ronchi.    Abdominal: Abdomen soft, bowel sounds normoactive, no masses, no hepatosplenomegaly, non-tender, no bruits.     Musculoskeletal: No deformities, clubbing, cyanosis, erythema, or edema observed. There are no spinal abnormalities noted. Normal muscle strength and tone. Pulses full and equal in all extremities, no bruits auscultated.     Neurological: No gross motor or sensory deficits noted, affect appropriate, oriented to time, person, place.     Skin: Warm and dry to the touch, no apparent skin lesions or masses noted.     Psychiatric: She has a normal mood and affect. Her behavior is normal. Judgment and thought content normal.     Results  Review:    Lab Results (last 24 hours)       Procedure Component Value Units Date/Time    Basic Metabolic Panel [[id number]]  (Abnormal) Collected: 07/23/23 0510    Specimen: Blood Updated: 07/23/23 0601     Glucose 106 mg/dL      BUN 17 mg/dL      Creatinine 0.59 mg/dL      Sodium 140 mmol/L      Potassium 4.1 mmol/L      Chloride 105 mmol/L      CO2 27.9 mmol/L      Calcium 9.2 mg/dL      BUN/Creatinine Ratio 28.8     Anion Gap 7.1 mmol/L      eGFR 102.0 mL/min/1.73     Narrative:      GFR Normal >60  Chronic Kidney Disease <60  Kidney Failure <15      Magnesium [[id number]]  (Normal) Collected: 07/23/23 0510    Specimen: Blood Updated: 07/23/23 0601     Magnesium 2.0 mg/dL     CBC & Differential [[id number]]  (Abnormal) Collected: 07/23/23 0510    Specimen: Blood Updated: 07/23/23 0544    Narrative:      The following orders were created for panel order CBC & Differential.  Procedure                               Abnormality         Status                     ---------                               -----------         ------                     CBC Auto Differential[[id number]]        Abnormal            Final result                 Please view results for these tests on the individual orders.    CBC Auto Differential [[id number]]  (Abnormal) Collected: 07/23/23 0510    Specimen: Blood Updated: 07/23/23 0544     WBC 13.30 10*3/mm3      RBC 3.81 10*6/mm3      Hemoglobin 11.5 g/dL      Hematocrit 34.0 %      MCV 89.2 fL      MCH 30.2 pg      MCHC 33.8 g/dL      RDW 16.6 %      RDW-SD 53.9 fl      MPV 10.2 fL      Platelets 239 10*3/mm3      Neutrophil % 66.9 %      Lymphocyte % 21.8 %      Monocyte % 8.8 %      Eosinophil % 1.7 %      Basophil % 0.1 %      Immature Grans % 0.7 %      Neutrophils, Absolute 8.91 10*3/mm3      Lymphocytes, Absolute 2.90 10*3/mm3      Monocytes, Absolute 1.17 10*3/mm3      Eosinophils, Absolute 0.22 10*3/mm3      Basophils, Absolute 0.01 10*3/mm3      Immature Grans, Absolute 0.09 10*3/mm3       nRBC 0.0 /100 WBC     Blood Culture - Blood, Arm, Right [[id number]]  (Normal) Collected: 07/21/23 0127    Specimen: Blood from Arm, Right Updated: 07/23/23 0145     Blood Culture No growth at 2 days    Blood Culture - Blood, Arm, Right [[id number]]  (Normal) Collected: 07/21/23 0125    Specimen: Blood from Arm, Right Updated: 07/23/23 0145     Blood Culture No growth at 2 days          Imaging Results (Last 24 Hours)       ** No results found for the last 24 hours. **            Medication Review:   Current Facility-Administered Medications   Medication Dose Route Frequency Provider Last Rate Last Admin    acetaminophen (TYLENOL) tablet 650 mg  650 mg Oral Q6H PRN Orlin Carreon MD   650 mg at 07/21/23 1234    aspirin EC tablet 81 mg  81 mg Oral Daily Orlin Carreon MD   81 mg at 07/23/23 0902    atorvastatin (LIPITOR) tablet 40 mg  40 mg Oral Nightly Orlin Carreon MD   40 mg at 07/22/23 2311    benzonatate (TESSALON) capsule 100 mg  100 mg Oral TID PRN Jessee Pelaez DO   100 mg at 07/23/23 0134    butalbital-acetaminophen-caffeine (ORBIVAN) -40 MG capsule 1 capsule  1 capsule Oral Q4H PRN Orlin Carreon MD   1 capsule at 07/21/23 1735    cefTRIAXone (ROCEPHIN) IVPB 1,000 mg  1,000 mg Intravenous Q24H Ike Roman  mL/hr at 07/23/23 0521 1,000 mg at 07/23/23 0521    Enoxaparin Sodium (LOVENOX) syringe 60 mg  1 mg/kg Subcutaneous Q12H Nicole Marx MD   60 mg at 07/23/23 0907    guaiFENesin (MUCINEX) 12 hr tablet 600 mg  600 mg Oral Q12H Ike Roman MD   600 mg at 07/23/23 0902    Hydrocod Fredrick-Chlorphe Fredrick ER (TUSSIONEX PENNKINETIC) 10-8 MG/5ML ER suspension 5 mL  5 mL Oral BID Orlin Carreon MD   5 mL at 07/23/23 1221    ipratropium-albuterol (DUO-NEB) nebulizer solution 3 mL  3 mL Nebulization Q4H - RT Ike Roman MD   3 mL at 07/23/23 1321    melatonin tablet 10 mg  10 mg Oral Nightly PRN Jessee Pelaez DO   10 mg at 07/22/23 2310    metoprolol succinate XL (TOPROL-XL) 24 hr  tablet 25 mg  25 mg Oral Q24H Nicole Marx MD   25 mg at 07/23/23 0902    morphine injection 2 mg  2 mg Intravenous Q4H PRN Orlin Carreon MD   2 mg at 07/23/23 0907    nicotine (NICODERM CQ) 21 MG/24HR patch 1 patch  1 patch Transdermal Q24H Ike Roman MD   1 patch at 07/23/23 0903    nitroglycerin (NITROSTAT) SL tablet 0.4 mg  0.4 mg Sublingual Q5 Min PRN Ike Roman MD   0.4 mg at 07/21/23 0939    ondansetron (ZOFRAN) injection 4 mg  4 mg Intravenous Q4H PRN Nicole Marx MD   4 mg at 07/22/23 0040    Pharmacy to Dose enoxaparin (LOVENOX)   Does not apply Continuous PRN Orlin Carreon MD        predniSONE (DELTASONE) tablet 40 mg  40 mg Oral Daily With Breakfast Orlin Carreon MD   40 mg at 07/23/23 0902    sodium chloride 0.9 % flush 10 mL  10 mL Intravenous PRN Ike Roman MD   10 mL at 07/22/23 2310         Assessment & Plan         Acute respiratory failure with hypoxia    COPD exacerbation  Abn troponin  Smoker        PLAN:     Echo Nl EF  Lexiscan Monday  Treat copd exacerbation             Nicole Marx MD  07/23/23  16:08 EDT

## 2023-07-23 NOTE — PLAN OF CARE
Goal Outcome Evaluation:  Plan of Care Reviewed With: patient        Progress: improving     Pt a/o times 4 vss cm sr sats 98% room air. No c/o chest pain

## 2023-07-23 NOTE — PROGRESS NOTES
[facility]   Hospitalist Progress Note  Date: 2023  Patient Name: Gloria Sunshine  : 1960  MRN: [id number]  Date of admission: 2023      Subjective   Subjective     Chief Complaint: Chest pain, SOA    Summary: 62 yo female with HTN presented with a couple of weeks of SOA, cough with some chest pain.  She had started a medrol dosepack and albuterol inhaler as an outpatient but did not improve.  She was admitted with concern for COPD exacerbation and respiratory infection.  Her initial troponin was mildly elevated but she had no significant chest pain.  Repeat increased substantially and she later developed chest pain, diagnosed with NSTEMI and seen by cardiology.    Interval Followup: No chest pain today; complaining of persistent cough, didn't get rest last night, Tessalon perles not helping    Review of Systems   All systems were reviewed and negative except for cough    Objective   Objective     Vitals:   Temp:  [97.3 °F (36.3 °C)-98.5 °F (36.9 °C)] 98.2 °F (36.8 °C)  Heart Rate:  [56-80] 63  Resp:  [18-20] 18  BP: ()/(55-94) 150/79  Physical Exam    Constitutional: Awake, alert, well-appearing   Eyes: Pupils equal, sclerae anicteric, no conjunctival injection   HENT: NCAT, mucous membranes moist   Neck: Supple, no thyromegaly, no lymphadenopathy, trachea midline   Respiratory: no wheeze but frequent cough   Cardiovascular: s1s2   Gastrointestinal: Positive bowel sounds, soft, nontender, nondistended   Musculoskeletal: No bilateral ankle edema, no clubbing or cyanosis to extremities   Psychiatric: Appropriate affect, cooperative   Neurologic: Oriented x 3, speech clear   Skin: No rashes     Result Review    Result Review:  I have reviewed the following:  [x]  Laboratory  CBC          2023    01:25 2023    04:28 2023    05:10   CBC   WBC 13.02  19.83  13.30    RBC 4.65  4.28  3.81    Hemoglobin 13.9  12.9  11.5    Hematocrit 40.5  39.9  34.0    MCV 87.1  93.2   89.2    MCH 29.9  30.1  30.2    MCHC 34.3  32.3  33.8    RDW 15.9  16.6  16.6    Platelets 298  253  239      CMP          7/21/2023    01:25 7/22/2023    05:20 7/23/2023    05:10   CMP   Glucose 126  113  106    BUN 9  8  17    Creatinine 0.56  0.54  0.59    EGFR 103.3  104.2  102.0    Sodium 143  138  140    Potassium 2.9  4.9  4.1    Chloride 100  105  105    Calcium 9.4  9.4  9.2    Total Protein 6.7      Albumin 4.4  3.9     Globulin 2.3      Total Bilirubin 0.4      Alkaline Phosphatase 64      AST (SGOT) 19      ALT (SGPT) 16      Albumin/Globulin Ratio 1.9      BUN/Creatinine Ratio 16.1  14.8  28.8    Anion Gap 14.5  11.6  7.1      CARDIAC LABS:      Lab 07/21/23  0939 07/21/23  0807 07/21/23  0125   PROBNP  --   --  176.1   HSTROP T 179* 186* 33*      []  Microbiology  [x]  Radiology  CT Chest With Contrast Diagnostic    Result Date: 7/22/2023   No pulmonary embolism is seen.  Severe emphysematous changes involve the lungs.  No acute infiltrate is identified.  Mild linear pleural-parenchymal scarring involves the anterior, inferior left lower lobe.    Please note that portions of this note were completed with a voice recognition program.  ANNETTE FOWLER JR, MD       Electronically Signed and Approved By: ANNETTE FOWLER JR, MD on 7/22/2023 at 4:42               [x]  EKG/Telemetry       []  Cardiology/Vascular   []  Patholog  []  Old records  []  Other:    Assessment & Plan   Assessment / Plan     Assessment:  Acute NSTEMI  Acute exacerbation of COPD  Acute bronchitis  Hypertension  Tobacco abuse  Leukocytosis due to steroids most likely    Plan:  Continue tele  Aspirin, statin, BB  Add Tussionex for cough  Changed SoluMedrol to prednisone  Short course abx  Nebs  Stress test tomorrow per cardiology    DVT prophylaxis:  Medical DVT prophylaxis orders are present.    CODE STATUS:   Level Of Support Discussed With: Patient  Code Status (Patient has no pulse and is not breathing): CPR (Attempt to  Resuscitate)  Medical Interventions (Patient has pulse or is breathing): Full Support  Release to patient: Routine Release      Electronically signed by Orlin Carreon MD, 07/23/23, 11:28 AM EDT.

## 2023-07-24 ENCOUNTER — APPOINTMENT (OUTPATIENT)
Dept: NUCLEAR MEDICINE | Facility: HOSPITAL | Age: 63
DRG: 190 | End: 2023-07-24
Payer: COMMERCIAL

## 2023-07-24 LAB
ANION GAP SERPL CALCULATED.3IONS-SCNC: 8.3 MMOL/L (ref 5–15)
BASOPHILS # BLD AUTO: 0.02 10*3/MM3 (ref 0–0.2)
BASOPHILS NFR BLD AUTO: 0.2 % (ref 0–1.5)
BH CV IMMEDIATE POST RECOVERY TECH DATA SYMPTOMS: NORMAL
BH CV IMMEDIATE POST TECH DATA BLOOD PRESSURE: NORMAL MMHG
BH CV IMMEDIATE POST TECH DATA HEART RATE: 95 BPM
BH CV IMMEDIATE POST TECH DATA OXYGEN SATS: 100 %
BH CV REST NUCLEAR ISOTOPE DOSE: 9.4 MCI
BH CV SIX MINUTE RECOVERY TECH DATA BLOOD PRESSURE: NORMAL
BH CV SIX MINUTE RECOVERY TECH DATA HEART RATE: 76 BPM
BH CV SIX MINUTE RECOVERY TECH DATA OXYGEN SATURATION: 98 %
BH CV SIX MINUTE RECOVERY TECH DATA SYMPTOMS: NORMAL
BH CV STRESS BP STAGE 1: NORMAL
BH CV STRESS COMMENTS STAGE 1: NORMAL
BH CV STRESS DOSE REGADENOSON STAGE 1: 0.4
BH CV STRESS DURATION MIN STAGE 1: 0
BH CV STRESS DURATION SEC STAGE 1: 10
BH CV STRESS HR STAGE 1: 75
BH CV STRESS NUCLEAR ISOTOPE DOSE: 35 MCI
BH CV STRESS O2 STAGE 1: 98
BH CV STRESS PROTOCOL 1: NORMAL
BH CV STRESS RECOVERY BP: NORMAL MMHG
BH CV STRESS RECOVERY HR: 76 BPM
BH CV STRESS RECOVERY O2: 98 %
BH CV STRESS STAGE 1: 1
BH CV THREE MINUTE POST TECH DATA BLOOD PRESSURE: NORMAL MMHG
BH CV THREE MINUTE POST TECH DATA HEART RATE: 78 BPM
BH CV THREE MINUTE POST TECH DATA OXYGEN SATURATION: 98 %
BUN SERPL-MCNC: 15 MG/DL (ref 8–23)
BUN/CREAT SERPL: 26.3 (ref 7–25)
CALCIUM SPEC-SCNC: 9 MG/DL (ref 8.6–10.5)
CHLORIDE SERPL-SCNC: 104 MMOL/L (ref 98–107)
CO2 SERPL-SCNC: 26.7 MMOL/L (ref 22–29)
CREAT SERPL-MCNC: 0.57 MG/DL (ref 0.57–1)
DEPRECATED RDW RBC AUTO: 53 FL (ref 37–54)
EGFRCR SERPLBLD CKD-EPI 2021: 102.9 ML/MIN/1.73
EOSINOPHIL # BLD AUTO: 0.13 10*3/MM3 (ref 0–0.4)
EOSINOPHIL NFR BLD AUTO: 1.1 % (ref 0.3–6.2)
ERYTHROCYTE [DISTWIDTH] IN BLOOD BY AUTOMATED COUNT: 16.4 % (ref 12.3–15.4)
GLUCOSE SERPL-MCNC: 126 MG/DL (ref 65–99)
HCT VFR BLD AUTO: 35.6 % (ref 34–46.6)
HGB BLD-MCNC: 12 G/DL (ref 12–15.9)
IMM GRANULOCYTES # BLD AUTO: 0.14 10*3/MM3 (ref 0–0.05)
IMM GRANULOCYTES NFR BLD AUTO: 1.2 % (ref 0–0.5)
LV EF NUC BP: 45 %
LYMPHOCYTES # BLD AUTO: 2.81 10*3/MM3 (ref 0.7–3.1)
LYMPHOCYTES NFR BLD AUTO: 23.2 % (ref 19.6–45.3)
MAGNESIUM SERPL-MCNC: 2.1 MG/DL (ref 1.6–2.4)
MAXIMAL PREDICTED HEART RATE: 158 BPM
MCH RBC QN AUTO: 29.6 PG (ref 26.6–33)
MCHC RBC AUTO-ENTMCNC: 33.7 G/DL (ref 31.5–35.7)
MCV RBC AUTO: 87.9 FL (ref 79–97)
MONOCYTES # BLD AUTO: 1.5 10*3/MM3 (ref 0.1–0.9)
MONOCYTES NFR BLD AUTO: 12.4 % (ref 5–12)
NEUTROPHILS NFR BLD AUTO: 61.9 % (ref 42.7–76)
NEUTROPHILS NFR BLD AUTO: 7.52 10*3/MM3 (ref 1.7–7)
NRBC BLD AUTO-RTO: 0 /100 WBC (ref 0–0.2)
PERCENT MAX PREDICTED HR: 60.13 %
PLATELET # BLD AUTO: 238 10*3/MM3 (ref 140–450)
PMV BLD AUTO: 10 FL (ref 6–12)
POTASSIUM SERPL-SCNC: 3.7 MMOL/L (ref 3.5–5.2)
RBC # BLD AUTO: 4.05 10*6/MM3 (ref 3.77–5.28)
SODIUM SERPL-SCNC: 139 MMOL/L (ref 136–145)
STRESS BASELINE BP: NORMAL MMHG
STRESS BASELINE HR: 64 BPM
STRESS O2 SAT REST: 97 %
STRESS PERCENT HR: 71 %
STRESS POST O2 SAT PEAK: 100 %
STRESS POST PEAK BP: NORMAL MMHG
STRESS POST PEAK HR: 95 BPM
STRESS TARGET HR: 134 BPM
WBC NRBC COR # BLD: 12.12 10*3/MM3 (ref 3.4–10.8)

## 2023-07-24 PROCEDURE — 94799 UNLISTED PULMONARY SVC/PX: CPT

## 2023-07-24 PROCEDURE — 25010000002 ENOXAPARIN PER 10 MG: Performed by: INTERNAL MEDICINE

## 2023-07-24 PROCEDURE — 0 TECHNETIUM TETROFOSMIN KIT: Performed by: FAMILY MEDICINE

## 2023-07-24 PROCEDURE — 25010000002 REGADENOSON 0.4 MG/5ML SOLUTION: Performed by: FAMILY MEDICINE

## 2023-07-24 PROCEDURE — 99232 SBSQ HOSP IP/OBS MODERATE 35: CPT | Performed by: FAMILY MEDICINE

## 2023-07-24 PROCEDURE — 78452 HT MUSCLE IMAGE SPECT MULT: CPT

## 2023-07-24 PROCEDURE — 93016 CV STRESS TEST SUPVJ ONLY: CPT | Performed by: NURSE PRACTITIONER

## 2023-07-24 PROCEDURE — 25010000002 MORPHINE PER 10 MG: Performed by: INTERNAL MEDICINE

## 2023-07-24 PROCEDURE — 85025 COMPLETE CBC W/AUTO DIFF WBC: CPT | Performed by: STUDENT IN AN ORGANIZED HEALTH CARE EDUCATION/TRAINING PROGRAM

## 2023-07-24 PROCEDURE — 63710000001 PREDNISONE PER 1 MG: Performed by: INTERNAL MEDICINE

## 2023-07-24 PROCEDURE — 78452 HT MUSCLE IMAGE SPECT MULT: CPT | Performed by: SPECIALIST

## 2023-07-24 PROCEDURE — 99232 SBSQ HOSP IP/OBS MODERATE 35: CPT | Performed by: SPECIALIST

## 2023-07-24 PROCEDURE — 83735 ASSAY OF MAGNESIUM: CPT | Performed by: STUDENT IN AN ORGANIZED HEALTH CARE EDUCATION/TRAINING PROGRAM

## 2023-07-24 PROCEDURE — A9502 TC99M TETROFOSMIN: HCPCS | Performed by: FAMILY MEDICINE

## 2023-07-24 PROCEDURE — 80048 BASIC METABOLIC PNL TOTAL CA: CPT | Performed by: STUDENT IN AN ORGANIZED HEALTH CARE EDUCATION/TRAINING PROGRAM

## 2023-07-24 PROCEDURE — 93018 CV STRESS TEST I&R ONLY: CPT | Performed by: SPECIALIST

## 2023-07-24 PROCEDURE — 94664 DEMO&/EVAL PT USE INHALER: CPT

## 2023-07-24 PROCEDURE — 93017 CV STRESS TEST TRACING ONLY: CPT

## 2023-07-24 PROCEDURE — 25010000002 CEFTRIAXONE PER 250 MG: Performed by: STUDENT IN AN ORGANIZED HEALTH CARE EDUCATION/TRAINING PROGRAM

## 2023-07-24 RX ORDER — REGADENOSON 0.08 MG/ML
0.4 INJECTION, SOLUTION INTRAVENOUS
Status: COMPLETED | OUTPATIENT
Start: 2023-07-24 | End: 2023-07-24

## 2023-07-24 RX ORDER — BUDESONIDE 0.5 MG/2ML
0.5 INHALANT ORAL
Status: DISCONTINUED | OUTPATIENT
Start: 2023-07-24 | End: 2023-07-25 | Stop reason: HOSPADM

## 2023-07-24 RX ORDER — ARFORMOTEROL TARTRATE 15 UG/2ML
15 SOLUTION RESPIRATORY (INHALATION)
Status: DISCONTINUED | OUTPATIENT
Start: 2023-07-24 | End: 2023-07-25 | Stop reason: HOSPADM

## 2023-07-24 RX ADMIN — TETROFOSMIN 1 DOSE: 1.38 INJECTION, POWDER, LYOPHILIZED, FOR SOLUTION INTRAVENOUS at 08:30

## 2023-07-24 RX ADMIN — GUAIFENESIN 600 MG: 600 TABLET ORAL at 21:03

## 2023-07-24 RX ADMIN — NICOTINE 1 PATCH: 21 PATCH, EXTENDED RELEASE TRANSDERMAL at 10:08

## 2023-07-24 RX ADMIN — ASPIRIN 81 MG: 81 TABLET, COATED ORAL at 10:07

## 2023-07-24 RX ADMIN — CEFTRIAXONE SODIUM 1000 MG: 1 INJECTION, SOLUTION INTRAVENOUS at 05:15

## 2023-07-24 RX ADMIN — GUAIFENESIN 600 MG: 600 TABLET ORAL at 10:06

## 2023-07-24 RX ADMIN — IPRATROPIUM BROMIDE AND ALBUTEROL SULFATE 3 ML: 2.5; .5 SOLUTION RESPIRATORY (INHALATION) at 18:32

## 2023-07-24 RX ADMIN — METOPROLOL SUCCINATE 25 MG: 25 TABLET, EXTENDED RELEASE ORAL at 10:07

## 2023-07-24 RX ADMIN — MORPHINE SULFATE 2 MG: 2 INJECTION, SOLUTION INTRAMUSCULAR; INTRAVENOUS at 14:38

## 2023-07-24 RX ADMIN — IPRATROPIUM BROMIDE AND ALBUTEROL SULFATE 3 ML: 2.5; .5 SOLUTION RESPIRATORY (INHALATION) at 10:46

## 2023-07-24 RX ADMIN — HYDROCODONE POLISTIREX AND CHLORPHENIRAMINE POLISTIREX 5 ML: 10; 8 SUSPENSION, EXTENDED RELEASE ORAL at 10:08

## 2023-07-24 RX ADMIN — IPRATROPIUM BROMIDE AND ALBUTEROL SULFATE 3 ML: 2.5; .5 SOLUTION RESPIRATORY (INHALATION) at 00:25

## 2023-07-24 RX ADMIN — PREDNISONE 40 MG: 20 TABLET ORAL at 10:06

## 2023-07-24 RX ADMIN — TETROFOSMIN 1 DOSE: 1.38 INJECTION, POWDER, LYOPHILIZED, FOR SOLUTION INTRAVENOUS at 07:13

## 2023-07-24 RX ADMIN — ATORVASTATIN CALCIUM 40 MG: 40 TABLET, FILM COATED ORAL at 21:03

## 2023-07-24 RX ADMIN — MORPHINE SULFATE 2 MG: 2 INJECTION, SOLUTION INTRAMUSCULAR; INTRAVENOUS at 10:27

## 2023-07-24 RX ADMIN — Medication 10 ML: at 10:08

## 2023-07-24 RX ADMIN — BENZONATATE 100 MG: 100 CAPSULE ORAL at 05:23

## 2023-07-24 RX ADMIN — REGADENOSON 0.4 MG: 0.08 INJECTION, SOLUTION INTRAVENOUS at 08:30

## 2023-07-24 RX ADMIN — HYDROCODONE POLISTIREX AND CHLORPHENIRAMINE POLISTIREX 5 ML: 10; 8 SUSPENSION, EXTENDED RELEASE ORAL at 21:03

## 2023-07-24 RX ADMIN — ENOXAPARIN SODIUM 60 MG: 60 INJECTION SUBCUTANEOUS at 21:03

## 2023-07-24 RX ADMIN — IPRATROPIUM BROMIDE AND ALBUTEROL SULFATE 3 ML: 2.5; .5 SOLUTION RESPIRATORY (INHALATION) at 23:11

## 2023-07-24 NOTE — PROGRESS NOTES
[facility]   Hospitalist Progress Note  Date: 2023  Patient Name: Gloria Sunshine  : 1960  MRN: [id number]  Date of admission: 2023      Subjective   Subjective     Chief Complaint: Chest pain, SOA    Summary: 64 yo female with HTN presented with a couple of weeks of SOA, cough with some chest pain.  She had started a medrol dosepack and albuterol inhaler as an outpatient but did not improve.  She was admitted with concern for COPD exacerbation and respiratory infection.  Her initial troponin was mildly elevated but she had no significant chest pain.  Repeat increased substantially and she later developed chest pain, diagnosed with NSTEMI and seen by cardiology.    Interval Followup: Patient seen and examined resting comfortably in bed cough and shortness of air improving going for stress test this morning continuing cardiac telemetry continuing aspirin statin continue with Tussionex for cough.  Continue with Rocephin nebulizer treatments and prednisone for COPD exacerbation continuing with full dose Lovenox    Review of Systems   All systems were reviewed and negative except for cough    Objective   Objective     Vitals:   Temp:  [97.5 °F (36.4 °C)-98.4 °F (36.9 °C)] 97.5 °F (36.4 °C)  Heart Rate:  [59-72] 59  Resp:  [17-21] 18  BP: (125-149)/(68-91) 138/81  Flow (L/min):  [1] 1  Physical Exam    Constitutional: Awake, alert, well-appearing   Eyes: Pupils equal, sclerae anicteric, no conjunctival injection   HENT: NCAT, mucous membranes moist   Neck: Supple, no thyromegaly, no lymphadenopathy, trachea midline   Respiratory: no wheeze but frequent cough   Cardiovascular: s1s2   Gastrointestinal: Positive bowel sounds, soft, nontender, nondistended   Musculoskeletal: No bilateral ankle edema, no clubbing or cyanosis to extremities   Psychiatric: Appropriate affect, cooperative   Neurologic: Oriented x 3, speech clear   Skin: No rashes     Result Review    Result Review:  I have  reviewed the following:  [x]  Laboratory  CBC          7/22/2023    04:28 7/23/2023    05:10 7/24/2023    02:56   CBC   WBC 19.83  13.30  12.12    RBC 4.28  3.81  4.05    Hemoglobin 12.9  11.5  12.0    Hematocrit 39.9  34.0  35.6    MCV 93.2  89.2  87.9    MCH 30.1  30.2  29.6    MCHC 32.3  33.8  33.7    RDW 16.6  16.6  16.4    Platelets 253  239  238      CMP          7/22/2023    05:20 7/23/2023    05:10 7/24/2023    02:56   CMP   Glucose 113  106  126    BUN 8  17  15    Creatinine 0.54  0.59  0.57    EGFR 104.2  102.0  102.9    Sodium 138  140  139    Potassium 4.9  4.1  3.7    Chloride 105  105  104    Calcium 9.4  9.2  9.0    Albumin 3.9      BUN/Creatinine Ratio 14.8  28.8  26.3    Anion Gap 11.6  7.1  8.3    CARDIAC LABS:      Lab 07/21/23  0939 07/21/23  0807 07/21/23  0125   PROBNP  --   --  176.1   HSTROP T 179* 186* 33*        []  Microbiology  [x]  Radiology  No radiology results for the last day    [x]  EKG/Telemetry       []  Cardiology/Vascular   []  Patholog  []  Old records  []  Other:    Assessment & Plan   Assessment / Plan     Assessment:  Acute NSTEMI  Acute exacerbation of COPD  Acute bronchitis  Hypertension  Tobacco abuse  Leukocytosis due to steroids most likely    Plan:  Aspirin, statin, BB  Continue full dose Lovenox  Continue Tussionex for cough seems to be helping  Continue prednisone  Short course abx  Continue with nebulizers   Cardiology consultation continue care per the recommendations stress test today  Repeat a.m. labs  Further treatment contingent upon his hospital course  Discussed with RN  DVT prophylaxis:  Medical DVT prophylaxis orders are present.    CODE STATUS:   Level Of Support Discussed With: Patient  Code Status (Patient has no pulse and is not breathing): CPR (Attempt to Resuscitate)  Medical Interventions (Patient has pulse or is breathing): Full Support  Release to patient: Routine Release      Electronically signed by AGUILAR Rod, 07/24/23, 12:56 PM  EDT.    Attending documentation:  I reviewed the above documentation and independently reviewed and rounded and evaluated the patient and discussed the care plan with WENDI Gage PA-C, I agree with his findings and plan as documented, what I have added to the care plan and modified is as follows in my documentation and my medical decision making; 63-year-old female hospitalized on 7/21/2023 with chief complaint of chest pain and shortness of breath, cardiology consulted, in addition to chest pain, COPD with acute exacerbation, started on nebs and steroids, cardiac stress test performed 7/24/2023, was described as normal study, low risk.  Interval follow-up: Patient seen and examined between stress test procedure, no acute distress, no acute major overnight events, continues to have cough and short of breath symptoms but overall air entry and breathing has improved.  Did have a coughing fit during the procedure.  No chest pain or palpitations.  Review of systems obtained, all systems reviewed negative except for generalized fatigue.  Labs reviewed, creatinine 0.57, sodium 139, potassium 3.7, white blood cell count 12,000.  On physical exam well-appearing female, no acute distress, regular rate rhythm, diminished breath sounds with scattered wheezing, no rhonchi, soft nonte Pulmicort and Brovana nebs twice daily nder abdomen, no lower extreme edema.  Assessment as above, plan is discussed cardiac stress test results with Dr. Dover, recommendations appreciated, Tussionex for cough suppression, continue scheduled nebs every 6 hours, continue ceftriaxone empirically, continue Mucinex, continue aspirin 81 mg daily as well as prednisone, start Brovana Pulmicort nebs twice daily, possible discharge in 24 hours if remains clinically stable, a.m. labs, full code, DVT prophylaxis with Lovenox, clinical course dictate further management, discussed with nurse at the bedside.  More than 70% of the time of this patient's  encounter was performed by me, this included face-to-face time, planning and coordinating, medical decision making and critical thinking personally done by me.  -AVBMD    Electronically signed by Greg Ponce MD, 07/24/23, 4:59 PM EDT.  Portions of this documentation were transcribed electronically from a voice recognition software.  I confirm all data accurately represents the service(s) I performed at today's visit.

## 2023-07-24 NOTE — PROGRESS NOTES
[facility]   Cardiology Progress Note      Patient Name: Gloria Sunshine  : 1960  MRN: [id number]  Primary Care Physician:  Provider, No Known  Referring Physician: No Known Provider  Date of admission: 2023    Subjective   Subjective     Chief Complaint: COPD exacerbation    HPI:  Gloria Sunshine is a 63 y.o. female admitted with COPD exacerbation has slightly increased troponins.  No complaints no chest pain    REVIEW OF SYSTEMS    Constitutional:    No fever, no weight loss  Skin:     No rash  Otolaryngeal:    No difficulty swallowing  Cardiovascular:  No chest pain or shortness of breath  Pulmonary:    No cough, no sputum production    Objective    Objective     Vitals:   Vitals:    23 1915 23 2240 23 0025 23 0300   BP: 146/91 149/83  138/81   BP Location: Right arm Right arm  Right arm   Patient Position: Lying Lying  Lying   Pulse: 72 66     Resp:    Temp: 97.9 °F (36.6 °C) 98.4 °F (36.9 °C)  97.5 °F (36.4 °C)   TempSrc: Oral Oral  Oral   SpO2: 100% 100% 97% 98%   Weight:       Height:                Physical Exam:   Constitutional: Awake, alert, No acute distress    Eyes: PERRLA, sclerae anicteric, no conjunctival injection   HENT: NCAT, mucous membranes moist   Neck: Supple, no thyromegaly, no lymphadenopathy, trachea midline   Respiratory: Clear to auscultation bilaterally, nonlabored respirations    Cardiovascular: RRR, no murmurs, rubs, or gallops, palpable pedal pulses bilaterally       Current medications:  aspirin, 81 mg, Oral, Daily  atorvastatin, 40 mg, Oral, Nightly  cefTRIAXone, 1,000 mg, Intravenous, Q24H  enoxaparin, 1 mg/kg, Subcutaneous, Q12H  guaiFENesin, 600 mg, Oral, Q12H  Hydrocod Fredrick-Chlorphe Fredrick ER, 5 mL, Oral, BID  ipratropium-albuterol, 3 mL, Nebulization, Q4H - RT  metoprolol succinate XL, 25 mg, Oral, Q24H  nicotine, 1 patch, Transdermal, Q24H  predniSONE, 40 mg, Oral, Daily With Breakfast      Current IV  drips:  Pharmacy to Dose enoxaparin (LOVENOX),         Result Review    Result Review:  I have personally reviewed the results from the time of this admission to 7/24/2023 08:45 EDT and agree with these findings:  []  Laboratory  []  EKG/Telemetry   []  Cardiology/Vascular   []  Radiology         CBC          7/22/2023    04:28 7/23/2023    05:10 7/24/2023    02:56   CBC   WBC 19.83  13.30  12.12    RBC 4.28  3.81  4.05    Hemoglobin 12.9  11.5  12.0    Hematocrit 39.9  34.0  35.6    MCV 93.2  89.2  87.9    MCH 30.1  30.2  29.6    MCHC 32.3  33.8  33.7    RDW 16.6  16.6  16.4    Platelets 253  239  238      CMP          7/22/2023    05:20 7/23/2023    05:10 7/24/2023    02:56   CMP   Glucose 113  106  126    BUN 8  17  15    Creatinine 0.54  0.59  0.57    EGFR 104.2  102.0  102.9    Sodium 138  140  139    Potassium 4.9  4.1  3.7    Chloride 105  105  104    Calcium 9.4  9.2  9.0    Albumin 3.9      BUN/Creatinine Ratio 14.8  28.8  26.3    Anion Gap 11.6  7.1  8.3      Results for orders placed during the hospital encounter of 07/21/23    Adult Transthoracic Echo Complete W/ Cont if Necessary Per Protocol    Interpretation Summary    Left ventricular ejection fraction appears to be 51 - 55%.    Estimated right ventricular systolic pressure from tricuspid regurgitation is mildly elevated (35-45 mmHg).    No singificant valve abnormalities    No pericardial effusion        Lab Results   Component Value Date    PROBNP 176.1 07/21/2023         Telemetry reviewed     Assessment / Plan     ASSESSMENT:    Acute respiratory failure with hypoxia    COPD exacerbation        PLAN:  1.  Sestamibi stress test in view of slightly increased troponins.  2.  Normal echocardiogram.  3.  Continue current management for COPD.    Electronically signed by Yaya Dover MD, 07/24/23, 8:45 AM EDT.

## 2023-07-24 NOTE — PLAN OF CARE
Goal Outcome Evaluation:      Patient has had no complaint of chest pain this shift. She had stress test this morning. She c/o headache x2. PRN iv pain medication given.

## 2023-07-24 NOTE — PLAN OF CARE
Problem: Adult Inpatient Plan of Care  Goal: Plan of Care Review  Outcome: Ongoing, Progressing  Flowsheets  Taken 7/24/2023 0403 by Leyla Molina, RN  Progress: no change  Taken 7/23/2023 0313 by Delmy Bruner, RN  Plan of Care Reviewed With: patient   Goal Outcome Evaluation:           Progress: no change

## 2023-07-25 ENCOUNTER — READMISSION MANAGEMENT (OUTPATIENT)
Dept: CALL CENTER | Facility: HOSPITAL | Age: 63
End: 2023-07-25
Payer: COMMERCIAL

## 2023-07-25 VITALS
HEART RATE: 52 BPM | HEIGHT: 71 IN | SYSTOLIC BLOOD PRESSURE: 152 MMHG | BODY MASS INDEX: 17.41 KG/M2 | DIASTOLIC BLOOD PRESSURE: 83 MMHG | TEMPERATURE: 98.2 F | RESPIRATION RATE: 18 BRPM | WEIGHT: 124.34 LBS | OXYGEN SATURATION: 100 %

## 2023-07-25 PROCEDURE — 99239 HOSP IP/OBS DSCHRG MGMT >30: CPT | Performed by: FAMILY MEDICINE

## 2023-07-25 PROCEDURE — 25010000002 CEFTRIAXONE PER 250 MG: Performed by: STUDENT IN AN ORGANIZED HEALTH CARE EDUCATION/TRAINING PROGRAM

## 2023-07-25 PROCEDURE — 63710000001 PREDNISONE PER 1 MG: Performed by: INTERNAL MEDICINE

## 2023-07-25 RX ORDER — ALBUTEROL SULFATE 90 UG/1
2 AEROSOL, METERED RESPIRATORY (INHALATION) EVERY 4 HOURS PRN
Qty: 18 G | Refills: 0 | Status: SHIPPED | OUTPATIENT
Start: 2023-07-25

## 2023-07-25 RX ORDER — CEFDINIR 300 MG/1
300 CAPSULE ORAL 2 TIMES DAILY
Qty: 10 CAPSULE | Refills: 0 | Status: SHIPPED | OUTPATIENT
Start: 2023-07-25 | End: 2023-07-30

## 2023-07-25 RX ORDER — ATORVASTATIN CALCIUM 40 MG/1
40 TABLET, FILM COATED ORAL NIGHTLY
Qty: 30 TABLET | Refills: 0 | Status: SHIPPED | OUTPATIENT
Start: 2023-07-25 | End: 2023-08-24

## 2023-07-25 RX ORDER — NITROGLYCERIN 0.4 MG/1
0.4 TABLET SUBLINGUAL
Qty: 100 TABLET | Refills: 0 | Status: SHIPPED | OUTPATIENT
Start: 2023-07-25

## 2023-07-25 RX ORDER — HYDROCODONE POLISTIREX AND CHLORPHENIRAMINE POLISTIREX 10; 8 MG/5ML; MG/5ML
5 SUSPENSION, EXTENDED RELEASE ORAL 2 TIMES DAILY
Qty: 50 ML | Refills: 0 | Status: SHIPPED | OUTPATIENT
Start: 2023-07-25 | End: 2023-07-30

## 2023-07-25 RX ORDER — PREDNISONE 20 MG/1
40 TABLET ORAL
Qty: 10 TABLET | Refills: 0 | Status: SHIPPED | OUTPATIENT
Start: 2023-07-25 | End: 2023-07-30

## 2023-07-25 RX ORDER — BUDESONIDE AND FORMOTEROL FUMARATE DIHYDRATE 80; 4.5 UG/1; UG/1
2 AEROSOL RESPIRATORY (INHALATION)
Qty: 10.2 G | Refills: 0 | Status: SHIPPED | OUTPATIENT
Start: 2023-07-25 | End: 2023-08-24

## 2023-07-25 RX ORDER — METOPROLOL SUCCINATE 25 MG/1
25 TABLET, EXTENDED RELEASE ORAL
Qty: 30 TABLET | Refills: 0 | Status: SHIPPED | OUTPATIENT
Start: 2023-07-25 | End: 2023-08-24

## 2023-07-25 RX ORDER — NICOTINE 21 MG/24HR
1 PATCH, TRANSDERMAL 24 HOURS TRANSDERMAL EVERY 24 HOURS
Qty: 30 PATCH | Refills: 0 | Status: SHIPPED | OUTPATIENT
Start: 2023-07-25 | End: 2023-08-24

## 2023-07-25 RX ORDER — ASPIRIN 81 MG/1
81 TABLET ORAL DAILY
Qty: 30 TABLET | Refills: 0 | Status: SHIPPED | OUTPATIENT
Start: 2023-07-25 | End: 2023-08-24

## 2023-07-25 RX ADMIN — HYDROCODONE POLISTIREX AND CHLORPHENIRAMINE POLISTIREX 5 ML: 10; 8 SUSPENSION, EXTENDED RELEASE ORAL at 09:22

## 2023-07-25 RX ADMIN — Medication 10 MG: at 00:12

## 2023-07-25 RX ADMIN — PREDNISONE 40 MG: 20 TABLET ORAL at 09:22

## 2023-07-25 RX ADMIN — NICOTINE 1 PATCH: 21 PATCH, EXTENDED RELEASE TRANSDERMAL at 09:22

## 2023-07-25 RX ADMIN — CEFTRIAXONE SODIUM 1000 MG: 1 INJECTION, SOLUTION INTRAVENOUS at 05:23

## 2023-07-25 RX ADMIN — METOPROLOL SUCCINATE 25 MG: 25 TABLET, EXTENDED RELEASE ORAL at 09:22

## 2023-07-25 RX ADMIN — GUAIFENESIN 600 MG: 600 TABLET ORAL at 09:22

## 2023-07-25 RX ADMIN — ASPIRIN 81 MG: 81 TABLET, COATED ORAL at 09:22

## 2023-07-26 LAB
BACTERIA SPEC AEROBE CULT: NORMAL
BACTERIA SPEC AEROBE CULT: NORMAL

## 2023-07-26 NOTE — DISCHARGE SUMMARY
[facility]         HOSPITALIST  DISCHARGE SUMMARY    Patient Name: Gloria Sunshine  : 1960  MRN: [id number]    Date of Admission: 2023  Date of Discharge:  2023    Primary Care Physician: Anisa Jackson, ELENA    Consults       No orders found from 2023 to 2023.            Active and Resolved Hospital Problems:  Acute NSTEMI  Acute exacerbation of COPD  Acute bronchitis  Hypertension  Tobacco abuse  Leukocytosis due to steroids most likely    Active Hospital Problems    Diagnosis POA    **Acute respiratory failure with hypoxia [J96.01] Yes    COPD exacerbation [J44.1] Yes      Resolved Hospital Problems   No resolved problems to display.       Hospital Course     Hospital Course:  63-year-old female hospitalized on 2023 with chief complaint of chest pain and shortness of breath, cardiology consulted, in addition to chest pain, COPD with acute exacerbation, started on nebs and steroids, cardiac stress test performed 2023, was described as normal study, low risk.  COPD stabilized, started on cardiac meds, discharged in hemodynamically stable condition on 2023 with COPD medications, to follow-up with the COPD clinic as well as cardiology.    Day of Discharge     Vital Signs:  Temp:  [97.9 °F (36.6 °C)-98.5 °F (36.9 °C)] 98.2 °F (36.8 °C)  Heart Rate:  [52-57] 52  Resp:  [18] 18  BP: (152-160)/(80-83) 152/83    Review of Systems              All systems were reviewed and negative except for cough       Physical Exam                         Constitutional: Awake, alert, well-appearing              Eyes: Pupils equal, sclerae anicteric, no conjunctival injection              HENT: NCAT, mucous membranes moist              Neck: Supple, no thyromegaly, no lymphadenopathy, trachea midline              Respiratory: no wheeze but frequent cough              Cardiovascular: s1s2              Gastrointestinal: Positive bowel sounds, soft, nontender,  nondistended              Musculoskeletal: No bilateral ankle edema, no clubbing or cyanosis to extremities              Psychiatric: Appropriate affect, cooperative              Neurologic: Oriented x 3, speech clear              Skin: No rashes       Discharge Details        Discharge Medications        New Medications        Instructions Start Date   albuterol sulfate  (90 Base) MCG/ACT inhaler  Commonly known as: PROVENTIL HFA;VENTOLIN HFA;PROAIR HFA   2 puffs, Inhalation, Every 4 Hours PRN      Aspirin Low Dose 81 MG EC tablet  Generic drug: aspirin   81 mg, Oral, Daily      atorvastatin 40 MG tablet  Commonly known as: LIPITOR   40 mg, Oral, Nightly      cefdinir 300 MG capsule  Commonly known as: OMNICEF   300 mg, Oral, 2 Times Daily      Hydrocod Fredrick-Chlorphe Fredrick ER 10-8 MG/5ML ER suspension  Commonly known as: TUSSIONEX PENNKINETIC   5 mL, Oral, 2 Times Daily      metoprolol succinate XL 25 MG 24 hr tablet  Commonly known as: TOPROL-XL   25 mg, Oral, Every 24 Hours Scheduled      nicotine 21 MG/24HR patch  Commonly known as: NICODERM CQ   1 patch, Transdermal, Every 24 Hours      nitroglycerin 0.4 MG SL tablet  Commonly known as: NITROSTAT   0.4 mg, Sublingual, Every 5 Minutes PRN, Take no more than 3 doses in 15 minutes.      predniSONE 20 MG tablet  Commonly known as: DELTASONE   40 mg, Oral, Daily With Breakfast      Symbicort 80-4.5 MCG/ACT inhaler  Generic drug: budesonide-formoterol   2 puffs, Inhalation, 2 Times Daily - RT             Continue These Medications        Instructions Start Date   hydroCHLOROthiazide 25 MG tablet  Commonly known as: HYDRODIURIL   25 mg, Oral, Daily      multivitamin with minerals tablet tablet   1 tablet, Oral, Daily               No Known Allergies    Discharge Disposition:  Home or Self Care    Diet:  Hospital:No active diet order      Discharge Activity:   Activity Instructions    As tolerated           CODE STATUS:  Code Status and Medical Interventions:    Ordered at: 07/21/23 0611     Level Of Support Discussed With:    Patient     Code Status (Patient has no pulse and is not breathing):    CPR (Attempt to Resuscitate)     Medical Interventions (Patient has pulse or is breathing):    Full Support     Release to patient:    Routine Release         Future Appointments   Date Time Provider Department Center   7/31/2023  8:30 AM Catina Molina APRN [facility] [place] [facility]       Additional Instructions for the Follow-ups that You Need to Schedule       Discharge Follow-up with PCP   As directed       Currently Documented PCP:    Anisa Jackson APRN    PCP Phone Number:    [phone]     Follow Up Details: 3 to 7 days                 Pertinent  and/or Most Recent Results     PROCEDURES:   Adult Transthoracic Echo Complete W/ Cont if Necessary Per Protocol    Result Date: 7/22/2023    Left ventricular ejection fraction appears to be 51 - 55%.   Estimated right ventricular systolic pressure from tricuspid regurgitation is mildly elevated (35-45 mmHg).   No singificant valve abnormalities   No pericardial effusion     CT Chest With Contrast Diagnostic    Result Date: 7/22/2023  PROCEDURE: CT CHEST W CONTRAST DIAGNOSTIC  COMPARISON: None.  INDICATIONS: Possible pulmonary embolism (PE); h/o shortness of breath for 2 days.  TECHNIQUE: After obtaining the patient's consent, 706 CT images were obtained with non-ionic intravenous contrast material.   PROTOCOL:   Standard pulmonary arteriogram CTA imaging protocol performed.    RADIATION:   Total DLP: 57 mGy*cm; total mAs: 1,036.   Automated exposure control was utilized to minimize radiation dose. CONTRAST: 75 mL Isovue 370 I.V.  FINDINGS:  No pulmonary embolism.  Severe emphysematous changes involve the lungs.  No acute infiltrate is seen.  There may be pleural-parenchymal predominantly linear scarring, especially in the left lung base (especially the anterior left lower lobe), such as on image 65 of series 8 and adjacent  images.  The central tracheobronchial tree is well aerated without filling defect.  No pleural or pericardial effusion.  No thoracic aortic aneurysm.  No cardiac enlargement.  A tiny hiatal hernia may be present.  No acute findings are seen within the partially imaged upper abdomen.  The contrast bolus is limited in the thoracic aorta, precluding assessment for thoracic aortic dissection.  No acute fracture or aggressive osseous lesion is suggested.  No enlarged mediastinal lymph nodes are seen.  No thyroid enlargement.  There are coronary artery calcifications.       No pulmonary embolism is seen.  Severe emphysematous changes involve the lungs.  No acute infiltrate is identified.  Mild linear pleural-parenchymal scarring involves the anterior, inferior left lower lobe.    Please note that portions of this note were completed with a voice recognition program.  ANNETTE FOWLER JR, MD       Electronically Signed and Approved By: ANNETTE FOWLER JR, MD on 7/22/2023 at 4:42              XR Chest 1 View    Result Date: 7/21/2023  PROCEDURE: XR CHEST 1 VW  COMPARISON: 9/17/2020.  INDICATIONS: Shortness of breath.  FINDINGS:  A single AP (or PA) upright portable chest radiograph was performed.  No cardiac enlargement is seen.  No acute infiltrate is appreciated.  No pleural effusion or pneumothorax is identified.  External artifacts obscure detail.  The thoracic aorta is mildly atherosclerotic.  No significant interval change is seen since the prior study (or studies).        No acute infiltrate is appreciated.     Please note that portions of this note were completed with a voice recognition program.  ANNETTE FOWLER JR, MD       Electronically Signed and Approved By: ANNETTE FOWLER JR, MD on 7/21/2023 at 1:45              Stress Test With Myocardial Perfusion One Day    Result Date: 7/24/2023    Left ventricular ejection fraction is mildly reduced (Calculated EF = 45%).   Myocardial perfusion imaging indicates a normal  myocardial perfusion study with no evidence of ischemia.   Impressions are consistent with a low risk study.   GI artifact is present.   Findings consistent with a normal ECG stress test.        LAB RESULTS:      Lab 07/24/23  0256 07/23/23  0510 07/22/23  0428 07/21/23  1335 07/21/23  0939 07/21/23  0807 07/21/23  0125   WBC 12.12* 13.30* 19.83*  --   --   --  13.02*   HEMOGLOBIN 12.0 11.5* 12.9  --   --   --  13.9   HEMATOCRIT 35.6 34.0 39.9  --   --   --  40.5   PLATELETS 238 239 253  --   --   --  298   NEUTROS ABS 7.52* 8.91* 15.60*  --   --   --  3.78   IMMATURE GRANS (ABS) 0.14* 0.09* 0.11*  --   --   --   --    LYMPHS ABS 2.81 2.90 2.60  --   --   --   --    MONOS ABS 1.50* 1.17* 1.45*  --   --   --   --    EOS ABS 0.13 0.22 0.04  --   --   --  1.04*   MCV 87.9 89.2 93.2  --   --   --  87.1   PROCALCITONIN  --   --   --   --   --   --  0.02   LACTATE  --   --   --  1.9 2.3* 2.0 2.2*         Lab 07/24/23  0256 07/23/23  0510 07/22/23  0520 07/21/23  0125   SODIUM 139 140 138 143   POTASSIUM 3.7 4.1 4.9 2.9*   CHLORIDE 104 105 105 100   CO2 26.7 27.9 21.4* 28.5   ANION GAP 8.3 7.1 11.6 14.5   BUN 15 17 8 9   CREATININE 0.57 0.59 0.54* 0.56*   EGFR 102.9 102.0 104.2 103.3   GLUCOSE 126* 106* 113* 126*   CALCIUM 9.0 9.2 9.4 9.4   MAGNESIUM 2.1 2.0 2.0 1.6   PHOSPHORUS  --   --  3.8  --          Lab 07/22/23  0520 07/21/23  0125   TOTAL PROTEIN  --  6.7   ALBUMIN 3.9 4.4   GLOBULIN  --  2.3   ALT (SGPT)  --  16   AST (SGOT)  --  19   BILIRUBIN  --  0.4   ALK PHOS  --  64         Lab 07/21/23  0939 07/21/23  0807 07/21/23  0125   PROBNP  --   --  176.1   HSTROP T 179* 186* 33*                 Brief Urine Lab Results       None          Microbiology Results (last 10 days)       Procedure Component Value - Date/Time    Influenza Antigen, Rapid - Swab, Nasopharynx [[id number]]  (Normal) Collected: 07/21/23 0848    Lab Status: Final result Specimen: Swab from Nasopharynx Updated: 07/21/23 0944     Influenza A Ag, EIA  Negative     Influenza B Ag, EIA Negative    Blood Culture - Blood, Arm, Right [[id number]]  (Normal) Collected: 07/21/23 0127    Lab Status: Preliminary result Specimen: Blood from Arm, Right Updated: 07/25/23 0145     Blood Culture No growth at 4 days    Blood Culture - Blood, Arm, Right [[id number]]  (Normal) Collected: 07/21/23 0125    Lab Status: Preliminary result Specimen: Blood from Arm, Right Updated: 07/25/23 0145     Blood Culture No growth at 4 days            CT Chest With Contrast Diagnostic    Result Date: 7/22/2023  Impression:  No pulmonary embolism is seen.  Severe emphysematous changes involve the lungs.  No acute infiltrate is identified.  Mild linear pleural-parenchymal scarring involves the anterior, inferior left lower lobe.    Please note that portions of this note were completed with a voice recognition program.  ANNETTE FOWLER JR, MD       Electronically Signed and Approved By: ANNETTE FOWLER JR, MD on 7/22/2023 at 4:42              XR Chest 1 View    Result Date: 7/21/2023  Impression:   No acute infiltrate is appreciated.     Please note that portions of this note were completed with a voice recognition program.  ANNETTE FOWLER JR, MD       Electronically Signed and Approved By: ANNETTE FOWLER JR, MD on 7/21/2023 at 1:45                       Results for orders placed during the hospital encounter of 07/21/23    Adult Transthoracic Echo Complete W/ Cont if Necessary Per Protocol    Interpretation Summary    Left ventricular ejection fraction appears to be 51 - 55%.    Estimated right ventricular systolic pressure from tricuspid regurgitation is mildly elevated (35-45 mmHg).    No singificant valve abnormalities    No pericardial effusion      Labs Pending at Discharge:  Pending Labs       Order Current Status    Blood Culture - Blood, Arm, Right Preliminary result    Blood Culture - Blood, Arm, Right Preliminary result              Time spent on Discharge including face to face service:  32  minutes    Electronically signed by Greg Ponce MD, 07/25/23, 8:04 PM EDT.    Portions of this documentation were transcribed electronically from a voice recognition software.  I confirm all data accurately represents the service(s) I performed at today's visit.

## 2023-07-26 NOTE — OUTREACH NOTE
Prep Survey      Flowsheet Row Responses   [facility] facility patient discharged from? [facility]   Is LACE score < 7 ? No   Eligibility Readm Mgmt   Discharge diagnosis Acute NSTEMIAcute exacerbation of COPD   Does the patient have one of the following disease processes/diagnoses(primary or secondary)? Acute MI (STEMI,NSTEMI)   Does the patient have Home health ordered? No   Is there a DME ordered? No   Prep survey completed? Yes            GUILHERME SANTOS - Registered Nurse

## 2023-07-26 NOTE — PROGRESS NOTES
"Enter Query Response Below      Query Response:   Type 2 MI due to COPD exacerbation  Electronically signed by Greg Ponce MD, 23, 7:03 AM EDT.               If applicable, please update the problem list.       Patient: Gloria Aquino        : 1960  Account: [id number]           Admit Date:         How to Respond to this query:       a. Click New Note     b. Answer query within the yellow box.                c. Update the Problem List, if applicable.      If you have any questions about this query contact me at: Sincerely,    Karthik Pina RN, BSN  Clinical Documentation Integrity  [facility]  [email or web address]       ,     63 year old female presented with increasing dyspnea after failed outpatient treatment with steroids, inhalers,  and was found to have COPD exacerbation, along with episodes of chest pain and elevated troponins reported as \"likely secondary to demand ischemia.\" Discharge summary also includes \"Acute NSTEMI.\"     -  per nursing \"patient was bent over on side of the bed holding her chest. Patient stated she was having severe chest pain that radiated to the left shoulder and down  the left arm.\"  Pain was relieved with Nitroglycerin.     -EKG showed T wave inversion in lateral  leads () per the cardiologist. Stress test was reported as \"normal study, low risk.\"   Troponin levels included - 33 (0125)-> 186 (0807)-> 179 (0939) and trop delta -7.      Please clarify the type of NSTEMI the patient was treated/monitored for:    Type 1 MI due to ______(please specify- plaque erosion, rupture, fissure or thrombus)  Type 2 MI due to _____________(please specify)  Other- specify______  Unable to determine      By submitting this query, we are merely seeking further clarification of documentation to accurately reflect all conditions that you are monitoring, evaluating, treating or that extend the hospitalization or utilize additional " resources of care. Please utilize your independent clinical judgment when addressing the question(s) above.     This query and your response, once completed, will be entered into the legal medical record.    Sincerely,  Karthik Pina  Clinical Documentation Integrity Program

## 2023-07-26 NOTE — PROGRESS NOTES
"Enter Query Response Below      Query Response: Acute Respiratory Failure with hypoxia, intermittent desaturations to the mid 80s while in the ER              If applicable, please update the problem list.       Patient: Gloria Aquino        : 1960  Account: [id number]           Admit Date:         How to Respond to this query:       a. Click New Note     b. Answer query within the yellow box.                c. Update the Problem List, if applicable.      If you have any questions about this query contact me at: Sincerely,    Karthik Pina RN, BSN  Clinical Documentation Integrity  [facility]  [email or web address])       ,     63 year old female presented with increasing dyspnea after failed outpatient treatment with steroids, inhalers.  Patient was noted to have \"Acute hypoxic respiratory failure secondary to COPD exacerbation\" in the H/P with this documentation falling off of the concurrent progress notes.  ED documentation shows oxygen sat. on arrival of 92%, RR 22.  No ABG's were obtained.  Patient was placed on 2L per NC with wean to room air later that day, no increased work of breathing.      After study, was acute respiratory failure clinically supported during this admission?    Acute respiratory failure was supported with additional clinical indicators:____________  Acute respiratory failure was not supported  Other- specify_____________  Unable to determine        By submitting this query, we are merely seeking further clarification of documentation to accurately reflect all conditions that you are monitoring, evaluating, treating or that extend the hospitalization or utilize additional resources of care. Please utilize your independent clinical judgment when addressing the question(s) above.     This query and your response, once completed, will be entered into the legal medical record.    Sincerely,  Karthik Pina  Clinical Documentation Integrity Program     "

## 2023-07-27 ENCOUNTER — READMISSION MANAGEMENT (OUTPATIENT)
Dept: CALL CENTER | Facility: HOSPITAL | Age: 63
End: 2023-07-27
Payer: COMMERCIAL

## 2023-07-27 NOTE — OUTREACH NOTE
AMI Week 1 Survey      Flowsheet Row Responses   [facility] facility patient discharged from? [facility]   Does the patient have one of the following disease processes/diagnoses(primary or secondary)? Acute MI (STEMI,NSTEMI)   Week 1 attempt successful? No   Unsuccessful attempts Attempt 1   Discharge diagnosis Acute NSTEMIAcute exacerbation of COPD            CONI CHRISTIAN - Registered Nurse

## 2023-07-31 ENCOUNTER — READMISSION MANAGEMENT (OUTPATIENT)
Dept: CALL CENTER | Facility: HOSPITAL | Age: 63
End: 2023-07-31
Payer: COMMERCIAL

## 2023-08-08 ENCOUNTER — READMISSION MANAGEMENT (OUTPATIENT)
Dept: CALL CENTER | Facility: HOSPITAL | Age: 63
End: 2023-08-08
Payer: COMMERCIAL

## 2023-08-08 NOTE — OUTREACH NOTE
AMI Week 2 Survey      Flowsheet Row Responses   [facility] facility patient discharged from? [facility]   Does the patient have one of the following disease processes/diagnoses(primary or secondary)? Acute MI (STEMI,NSTEMI)   Week 2 attempt successful? No   Unsuccessful attempts Attempt 1  [Reached family member who reports that patient is at a Dr appt today. ]            MADDIE MOSCOSO - Registered Nurse

## 2023-08-09 ENCOUNTER — OFFICE VISIT (OUTPATIENT)
Dept: CARDIOLOGY | Facility: CLINIC | Age: 63
End: 2023-08-09
Payer: COMMERCIAL

## 2023-08-09 VITALS
HEART RATE: 73 BPM | WEIGHT: 119 LBS | HEIGHT: 71 IN | BODY MASS INDEX: 16.66 KG/M2 | DIASTOLIC BLOOD PRESSURE: 85 MMHG | SYSTOLIC BLOOD PRESSURE: 146 MMHG

## 2023-08-09 DIAGNOSIS — E78.5 HYPERLIPIDEMIA LDL GOAL <100: ICD-10-CM

## 2023-08-09 DIAGNOSIS — I10 PRIMARY HYPERTENSION: Primary | ICD-10-CM

## 2023-08-09 PROBLEM — F32.A DEPRESSION: Status: ACTIVE | Noted: 2020-10-07

## 2023-08-09 PROBLEM — F41.9 ANXIETY: Status: ACTIVE | Noted: 2020-10-07

## 2023-08-09 NOTE — PROGRESS NOTES
Chief Complaint  Follow-up    Subjective            History of Present Illness  Gloria Sunshine is a 63-year-old female patient who presents to the office today for hospital follow-up.  She was admitted to [facility] from 7/21/2023 to 7/25/2023 for acute respiratory failure with COPD exacerbation.  While hospitalized she was also diagnosed with pneumonia and treated with nebulizers and steroids.  Her troponin level became elevated so echocardiogram and stress test were performed.  The echocardiogram showed normal heart function and no valvular abnormalities.  The stress test showed no evidence of ischemia.  Today she reports some lightheadedness due to her sinus issues.  She has been checking her blood pressure at home with normal ranges.  She denies any chest pain, shortness of breath, palpitations, or edema.  She is compliant with her medication that is prescribed for cholesterol and blood pressure.    PMH  Past Medical History:   Diagnosis Date    Hypertension        ALLERGY  No Known Allergies       SURGICALHX  Past Surgical History:   Procedure Laterality Date    CYST REMOVAL Left     breast    TUBAL ABDOMINAL LIGATION          SOC  Social History     Socioeconomic History    Marital status:    Tobacco Use    Smoking status: Every Day     Packs/day: 0.50     Types: Cigarettes    Smokeless tobacco: Never   Vaping Use    Vaping Use: Never used   Substance and Sexual Activity    Alcohol use: Not Currently    Drug use: Yes     Types: Marijuana       FAMHX  History reviewed. No pertinent family history.       MEDSIGONLY  Current Outpatient Medications on File Prior to Visit   Medication Sig    albuterol sulfate  (90 Base) MCG/ACT inhaler Inhale 2 puffs Every 4 (Four) Hours As Needed for Wheezing or Shortness of Air.    aspirin 81 MG EC tablet Take 1 tablet by mouth Daily for 30 days.    atorvastatin (LIPITOR) 40 MG tablet Take 1 tablet by mouth Every Night for 30 days.     "budesonide-formoterol (Symbicort) 80-4.5 MCG/ACT inhaler Inhale 2 puffs 2 (Two) Times a Day for 30 days.    hydroCHLOROthiazide (HYDRODIURIL) 25 MG tablet Take 1 tablet by mouth Daily.    metoprolol succinate XL (TOPROL-XL) 25 MG 24 hr tablet Take 1 tablet by mouth Daily for 30 days.    multivitamin with minerals tablet tablet Take 1 tablet by mouth Daily.    nicotine (NICODERM CQ) 21 MG/24HR patch Place 1 patch on the skin as directed by provider Daily for 30 days.    nitroglycerin (NITROSTAT) 0.4 MG SL tablet Place 1 tablet under the tongue Every 5 (Five) Minutes As Needed for Chest Pain. Take no more than 3 doses in 15 minutes.     No current facility-administered medications on file prior to visit.         Objective   /85   Pulse 73   Ht 180.3 cm (71\")   Wt 54 kg (119 lb)   BMI 16.60 kg/mý       Physical Exam  HENT:      Head: Normocephalic.   Neck:      Vascular: No carotid bruit.   Cardiovascular:      Rate and Rhythm: Normal rate and regular rhythm.      Pulses: Normal pulses.      Heart sounds: Normal heart sounds. No murmur heard.  Pulmonary:      Effort: Pulmonary effort is normal.      Breath sounds: Normal breath sounds.   Musculoskeletal:      Cervical back: Neck supple.      Right lower leg: No edema.      Left lower leg: No edema.   Skin:     General: Skin is dry.   Neurological:      Mental Status: She is alert and oriented to person, place, and time.   Psychiatric:         Behavior: Behavior normal.     Result Review :   The following data was reviewed by: ELENA Turner on 08/09/2023:  proBNP   Date Value Ref Range Status   07/21/2023 176.1 0.0 - 900.0 pg/mL Final     CMP          7/24/2023    02:56   CMP   Glucose 126    BUN 15    Creatinine 0.57    EGFR 102.9    Sodium 139    Potassium 3.7    Chloride 104    Calcium 9.0    Albumin    BUN/Creatinine Ratio 26.3    Anion Gap 8.3      CBC w/diff          7/24/2023    02:56   CBC w/Diff   WBC 12.12    RBC 4.05    Hemoglobin 12.0  " "  Hematocrit 35.6    MCV 87.9    MCH 29.6    MCHC 33.7    RDW 16.4    Platelets 238    Neutrophil Rel % 61.9    Immature Granulocyte Rel % 1.2    Lymphocyte Rel % 23.2    Monocyte Rel % 12.4    Eosinophil Rel % 1.1    Basophil Rel % 0.2       Lab Results   Component Value Date    TSH 4.630 10/07/2020      No results found for: FREET4   No results found for: DDIMERQUANT  Magnesium   Date Value Ref Range Status   07/24/2023 2.1 1.6 - 2.4 mg/dL Final      No results found for: DIGOXIN   Lab Results   Component Value Date    TROPONINT 179 (C) 07/21/2023           Results for orders placed during the hospital encounter of 07/21/23    Adult Transthoracic Echo Complete W/ Cont if Necessary Per Protocol    Interpretation Summary    Left ventricular ejection fraction appears to be 51 - 55%.    Estimated right ventricular systolic pressure from tricuspid regurgitation is mildly elevated (35-45 mmHg).    No singificant valve abnormalities    No pericardial effusion         Assessment and Plan    Diagnoses and all orders for this visit:    1. Primary hypertension (Primary)  Slightly elevated in office today however she reports \"normal\" at home. Check blood pressure twice a day for the next two weeks, blood pressure log provided for patient.  Will review log once available to me will make any necessary medication adjustments needed at that time.  For now continue hydrochlorothiazide 25 mg daily and metoprolol 25 mg daily.    2. Hyperlipidemia LDL goal <100  Last lipid panel was 10/1/2021 with  which is outside of goal range, continue atorvastatin 40 mg nightly and repeat fasting lipid and hepatic function panel to make sure this dose is being effective for her.          Follow Up   Return in about 1 year (around 8/9/2024) for Follow up with Dr Dover.    Patient was given instructions and counseling regarding her condition or for health maintenance advice. Please see specific information pulled into the AVS if " appropriate.     Gloria Johnson Bita  reports that she has been smoking cigarettes. She has been smoking an average of .5 packs per day. She has never used smokeless tobacco.. I have educated her on the risk of diseases from using tobacco products such as cancer, COPD, and heart disease.     I advised her to quit and she is not willing to quit.    I spent 3  minutes counseling the patient.           Caryl Amin, APRN  08/09/23  10:46 EDT    Dictated Utilizing Dragon Dictation

## 2023-08-11 ENCOUNTER — READMISSION MANAGEMENT (OUTPATIENT)
Dept: CALL CENTER | Facility: HOSPITAL | Age: 63
End: 2023-08-11
Payer: COMMERCIAL

## 2023-08-11 NOTE — OUTREACH NOTE
AMI Week 2 Survey      Flowsheet Row Responses   [facility]? [facility]   Does the patient have one of the following disease processes/diagnoses(primary or secondary)? Acute MI (STEMI,NSTEMI)   Week 2 attempt successful? No   Unsuccessful attempts Attempt 2            Sydnie BAHENA - Registered Nurse

## 2023-11-07 ENCOUNTER — TRANSCRIBE ORDERS (OUTPATIENT)
Dept: ADMINISTRATIVE | Facility: HOSPITAL | Age: 63
End: 2023-11-07
Payer: COMMERCIAL

## 2023-11-07 DIAGNOSIS — Z12.31 BREAST CANCER SCREENING BY MAMMOGRAM: Primary | ICD-10-CM

## 2023-11-24 ENCOUNTER — APPOINTMENT (OUTPATIENT)
Dept: GENERAL RADIOLOGY | Facility: HOSPITAL | Age: 63
End: 2023-11-24
Payer: COMMERCIAL

## 2023-11-24 ENCOUNTER — HOSPITAL ENCOUNTER (OUTPATIENT)
Facility: HOSPITAL | Age: 63
Setting detail: OBSERVATION
Discharge: HOME | End: 2023-11-26
Attending: EMERGENCY MEDICINE | Admitting: STUDENT IN AN ORGANIZED HEALTH CARE EDUCATION/TRAINING PROGRAM
Payer: COMMERCIAL

## 2023-11-24 DIAGNOSIS — R26.2 DIFFICULTY WALKING: ICD-10-CM

## 2023-11-24 DIAGNOSIS — J44.1 COPD WITH ACUTE EXACERBATION: Primary | ICD-10-CM

## 2023-11-24 LAB
ALBUMIN SERPL-MCNC: 5.1 G/DL (ref 3.5–5.2)
ALBUMIN/GLOB SERPL: 2 G/DL
ALP SERPL-CCNC: 72 U/L (ref 39–117)
ALT SERPL W P-5'-P-CCNC: 12 U/L (ref 1–33)
ANION GAP SERPL CALCULATED.3IONS-SCNC: 14.7 MMOL/L (ref 5–15)
AST SERPL-CCNC: 18 U/L (ref 1–32)
BASOPHILS # BLD AUTO: 0.05 10*3/MM3 (ref 0–0.2)
BASOPHILS NFR BLD AUTO: 0.6 % (ref 0–1.5)
BILIRUB SERPL-MCNC: 0.3 MG/DL (ref 0–1.2)
BUN SERPL-MCNC: 8 MG/DL (ref 8–23)
BUN/CREAT SERPL: 12.7 (ref 7–25)
CALCIUM SPEC-SCNC: 10.1 MG/DL (ref 8.6–10.5)
CHLORIDE SERPL-SCNC: 102 MMOL/L (ref 98–107)
CO2 SERPL-SCNC: 25.3 MMOL/L (ref 22–29)
CREAT SERPL-MCNC: 0.63 MG/DL (ref 0.57–1)
DEPRECATED RDW RBC AUTO: 52.4 FL (ref 37–54)
EGFRCR SERPLBLD CKD-EPI 2021: 100.4 ML/MIN/1.73
EOSINOPHIL # BLD AUTO: 0.35 10*3/MM3 (ref 0–0.4)
EOSINOPHIL NFR BLD AUTO: 3.9 % (ref 0.3–6.2)
ERYTHROCYTE [DISTWIDTH] IN BLOOD BY AUTOMATED COUNT: 16.1 % (ref 12.3–15.4)
FLUAV SUBTYP SPEC NAA+PROBE: NOT DETECTED
FLUBV RNA ISLT QL NAA+PROBE: NOT DETECTED
GLOBULIN UR ELPH-MCNC: 2.6 GM/DL
GLUCOSE SERPL-MCNC: 133 MG/DL (ref 65–99)
HCT VFR BLD AUTO: 40.4 % (ref 34–46.6)
HGB BLD-MCNC: 13.5 G/DL (ref 12–15.9)
HOLD SPECIMEN: NORMAL
HOLD SPECIMEN: NORMAL
IMM GRANULOCYTES # BLD AUTO: 0.02 10*3/MM3 (ref 0–0.05)
IMM GRANULOCYTES NFR BLD AUTO: 0.2 % (ref 0–0.5)
LYMPHOCYTES # BLD AUTO: 2.43 10*3/MM3 (ref 0.7–3.1)
LYMPHOCYTES NFR BLD AUTO: 27.3 % (ref 19.6–45.3)
MCH RBC QN AUTO: 29.5 PG (ref 26.6–33)
MCHC RBC AUTO-ENTMCNC: 33.4 G/DL (ref 31.5–35.7)
MCV RBC AUTO: 88.4 FL (ref 79–97)
MONOCYTES # BLD AUTO: 0.65 10*3/MM3 (ref 0.1–0.9)
MONOCYTES NFR BLD AUTO: 7.3 % (ref 5–12)
NEUTROPHILS NFR BLD AUTO: 5.39 10*3/MM3 (ref 1.7–7)
NEUTROPHILS NFR BLD AUTO: 60.7 % (ref 42.7–76)
NRBC BLD AUTO-RTO: 0 /100 WBC (ref 0–0.2)
NT-PROBNP SERPL-MCNC: 145.1 PG/ML (ref 0–900)
PLATELET # BLD AUTO: 390 10*3/MM3 (ref 140–450)
PMV BLD AUTO: 9.6 FL (ref 6–12)
POTASSIUM SERPL-SCNC: 3.6 MMOL/L (ref 3.5–5.2)
PROT SERPL-MCNC: 7.7 G/DL (ref 6–8.5)
QT INTERVAL: 440 MS
QTC INTERVAL: 473 MS
RBC # BLD AUTO: 4.57 10*6/MM3 (ref 3.77–5.28)
RSV RNA NPH QL NAA+NON-PROBE: NOT DETECTED
SARS-COV-2 RNA RESP QL NAA+PROBE: NOT DETECTED
SODIUM SERPL-SCNC: 142 MMOL/L (ref 136–145)
TROPONIN T SERPL HS-MCNC: <6 NG/L
WBC NRBC COR # BLD AUTO: 8.89 10*3/MM3 (ref 3.4–10.8)
WHOLE BLOOD HOLD COAG: NORMAL
WHOLE BLOOD HOLD SPECIMEN: NORMAL

## 2023-11-24 PROCEDURE — 99284 EMERGENCY DEPT VISIT MOD MDM: CPT

## 2023-11-24 PROCEDURE — 84484 ASSAY OF TROPONIN QUANT: CPT | Performed by: EMERGENCY MEDICINE

## 2023-11-24 PROCEDURE — 25010000002 METHYLPREDNISOLONE PER 125 MG: Performed by: EMERGENCY MEDICINE

## 2023-11-24 PROCEDURE — 87637 SARSCOV2&INF A&B&RSV AMP PRB: CPT | Performed by: EMERGENCY MEDICINE

## 2023-11-24 PROCEDURE — 94799 UNLISTED PULMONARY SVC/PX: CPT

## 2023-11-24 PROCEDURE — 80053 COMPREHEN METABOLIC PANEL: CPT | Performed by: EMERGENCY MEDICINE

## 2023-11-24 PROCEDURE — 71045 X-RAY EXAM CHEST 1 VIEW: CPT

## 2023-11-24 PROCEDURE — 96374 THER/PROPH/DIAG INJ IV PUSH: CPT

## 2023-11-24 PROCEDURE — 93005 ELECTROCARDIOGRAM TRACING: CPT

## 2023-11-24 PROCEDURE — 93005 ELECTROCARDIOGRAM TRACING: CPT | Performed by: EMERGENCY MEDICINE

## 2023-11-24 PROCEDURE — 94640 AIRWAY INHALATION TREATMENT: CPT

## 2023-11-24 PROCEDURE — 94761 N-INVAS EAR/PLS OXIMETRY MLT: CPT

## 2023-11-24 PROCEDURE — 85025 COMPLETE CBC W/AUTO DIFF WBC: CPT

## 2023-11-24 PROCEDURE — 83880 ASSAY OF NATRIURETIC PEPTIDE: CPT

## 2023-11-24 RX ORDER — IPRATROPIUM BROMIDE AND ALBUTEROL SULFATE 2.5; .5 MG/3ML; MG/3ML
3 SOLUTION RESPIRATORY (INHALATION) ONCE
Status: COMPLETED | OUTPATIENT
Start: 2023-11-24 | End: 2023-11-24

## 2023-11-24 RX ORDER — SODIUM CHLORIDE 0.9 % (FLUSH) 0.9 %
10 SYRINGE (ML) INJECTION AS NEEDED
Status: DISCONTINUED | OUTPATIENT
Start: 2023-11-24 | End: 2023-11-26 | Stop reason: HOSPADM

## 2023-11-24 RX ORDER — METHYLPREDNISOLONE SODIUM SUCCINATE 125 MG/2ML
125 INJECTION, POWDER, LYOPHILIZED, FOR SOLUTION INTRAMUSCULAR; INTRAVENOUS ONCE
Status: COMPLETED | OUTPATIENT
Start: 2023-11-24 | End: 2023-11-24

## 2023-11-24 RX ADMIN — IPRATROPIUM BROMIDE AND ALBUTEROL SULFATE 3 ML: .5; 3 SOLUTION RESPIRATORY (INHALATION) at 23:28

## 2023-11-24 RX ADMIN — IPRATROPIUM BROMIDE AND ALBUTEROL SULFATE 3 ML: .5; 3 SOLUTION RESPIRATORY (INHALATION) at 23:07

## 2023-11-24 RX ADMIN — IPRATROPIUM BROMIDE AND ALBUTEROL SULFATE 3 ML: .5; 3 SOLUTION RESPIRATORY (INHALATION) at 21:53

## 2023-11-24 RX ADMIN — IPRATROPIUM BROMIDE AND ALBUTEROL SULFATE 3 ML: .5; 3 SOLUTION RESPIRATORY (INHALATION) at 21:54

## 2023-11-24 RX ADMIN — METHYLPREDNISOLONE SODIUM SUCCINATE 125 MG: 125 INJECTION INTRAMUSCULAR; INTRAVENOUS at 22:03

## 2023-11-25 LAB
ANION GAP SERPL CALCULATED.3IONS-SCNC: 16.4 MMOL/L (ref 5–15)
BASOPHILS # BLD AUTO: 0.02 10*3/MM3 (ref 0–0.2)
BASOPHILS NFR BLD AUTO: 0.2 % (ref 0–1.5)
BUN SERPL-MCNC: 8 MG/DL (ref 8–23)
BUN/CREAT SERPL: 11.9 (ref 7–25)
CALCIUM SPEC-SCNC: 9.7 MG/DL (ref 8.6–10.5)
CHLORIDE SERPL-SCNC: 99 MMOL/L (ref 98–107)
CO2 SERPL-SCNC: 23.6 MMOL/L (ref 22–29)
CREAT SERPL-MCNC: 0.67 MG/DL (ref 0.57–1)
DEPRECATED RDW RBC AUTO: 52.5 FL (ref 37–54)
EGFRCR SERPLBLD CKD-EPI 2021: 99 ML/MIN/1.73
EOSINOPHIL # BLD AUTO: 0.02 10*3/MM3 (ref 0–0.4)
EOSINOPHIL NFR BLD AUTO: 0.2 % (ref 0.3–6.2)
ERYTHROCYTE [DISTWIDTH] IN BLOOD BY AUTOMATED COUNT: 16 % (ref 12.3–15.4)
GLUCOSE BLDC GLUCOMTR-MCNC: 128 MG/DL (ref 70–99)
GLUCOSE BLDC GLUCOMTR-MCNC: 140 MG/DL (ref 70–99)
GLUCOSE SERPL-MCNC: 219 MG/DL (ref 65–99)
HBA1C MFR BLD: 5.6 % (ref 4.8–5.6)
HCT VFR BLD AUTO: 38.8 % (ref 34–46.6)
HGB BLD-MCNC: 12.8 G/DL (ref 12–15.9)
IMM GRANULOCYTES # BLD AUTO: 0.02 10*3/MM3 (ref 0–0.05)
IMM GRANULOCYTES NFR BLD AUTO: 0.2 % (ref 0–0.5)
L PNEUMO1 AG UR QL IA: NEGATIVE
LYMPHOCYTES # BLD AUTO: 0.99 10*3/MM3 (ref 0.7–3.1)
LYMPHOCYTES NFR BLD AUTO: 12 % (ref 19.6–45.3)
M PNEUMO IGM SER QL: NEGATIVE
MCH RBC QN AUTO: 29.2 PG (ref 26.6–33)
MCHC RBC AUTO-ENTMCNC: 33 G/DL (ref 31.5–35.7)
MCV RBC AUTO: 88.6 FL (ref 79–97)
MONOCYTES # BLD AUTO: 0.08 10*3/MM3 (ref 0.1–0.9)
MONOCYTES NFR BLD AUTO: 1 % (ref 5–12)
NEUTROPHILS NFR BLD AUTO: 7.1 10*3/MM3 (ref 1.7–7)
NEUTROPHILS NFR BLD AUTO: 86.4 % (ref 42.7–76)
NRBC BLD AUTO-RTO: 0 /100 WBC (ref 0–0.2)
PLATELET # BLD AUTO: 362 10*3/MM3 (ref 140–450)
PMV BLD AUTO: 9.5 FL (ref 6–12)
POTASSIUM SERPL-SCNC: 3.8 MMOL/L (ref 3.5–5.2)
RBC # BLD AUTO: 4.38 10*6/MM3 (ref 3.77–5.28)
S PNEUM AG SPEC QL LA: NEGATIVE
SODIUM SERPL-SCNC: 139 MMOL/L (ref 136–145)
WBC NRBC COR # BLD AUTO: 8.23 10*3/MM3 (ref 3.4–10.8)

## 2023-11-25 PROCEDURE — 83036 HEMOGLOBIN GLYCOSYLATED A1C: CPT | Performed by: STUDENT IN AN ORGANIZED HEALTH CARE EDUCATION/TRAINING PROGRAM

## 2023-11-25 PROCEDURE — 96372 THER/PROPH/DIAG INJ SC/IM: CPT

## 2023-11-25 PROCEDURE — 96376 TX/PRO/DX INJ SAME DRUG ADON: CPT

## 2023-11-25 PROCEDURE — 94799 UNLISTED PULMONARY SVC/PX: CPT

## 2023-11-25 PROCEDURE — G0378 HOSPITAL OBSERVATION PER HR: HCPCS

## 2023-11-25 PROCEDURE — 87205 SMEAR GRAM STAIN: CPT | Performed by: STUDENT IN AN ORGANIZED HEALTH CARE EDUCATION/TRAINING PROGRAM

## 2023-11-25 PROCEDURE — 36415 COLL VENOUS BLD VENIPUNCTURE: CPT | Performed by: STUDENT IN AN ORGANIZED HEALTH CARE EDUCATION/TRAINING PROGRAM

## 2023-11-25 PROCEDURE — 82948 REAGENT STRIP/BLOOD GLUCOSE: CPT

## 2023-11-25 PROCEDURE — 96375 TX/PRO/DX INJ NEW DRUG ADDON: CPT

## 2023-11-25 PROCEDURE — 87449 NOS EACH ORGANISM AG IA: CPT | Performed by: STUDENT IN AN ORGANIZED HEALTH CARE EDUCATION/TRAINING PROGRAM

## 2023-11-25 PROCEDURE — 86738 MYCOPLASMA ANTIBODY: CPT | Performed by: STUDENT IN AN ORGANIZED HEALTH CARE EDUCATION/TRAINING PROGRAM

## 2023-11-25 PROCEDURE — 25010000002 HEPARIN (PORCINE) PER 1000 UNITS: Performed by: STUDENT IN AN ORGANIZED HEALTH CARE EDUCATION/TRAINING PROGRAM

## 2023-11-25 PROCEDURE — 25010000002 ONDANSETRON PER 1 MG: Performed by: EMERGENCY MEDICINE

## 2023-11-25 PROCEDURE — 87899 AGENT NOS ASSAY W/OPTIC: CPT | Performed by: STUDENT IN AN ORGANIZED HEALTH CARE EDUCATION/TRAINING PROGRAM

## 2023-11-25 PROCEDURE — 80048 BASIC METABOLIC PNL TOTAL CA: CPT | Performed by: STUDENT IN AN ORGANIZED HEALTH CARE EDUCATION/TRAINING PROGRAM

## 2023-11-25 PROCEDURE — 94664 DEMO&/EVAL PT USE INHALER: CPT

## 2023-11-25 PROCEDURE — 85025 COMPLETE CBC W/AUTO DIFF WBC: CPT | Performed by: STUDENT IN AN ORGANIZED HEALTH CARE EDUCATION/TRAINING PROGRAM

## 2023-11-25 PROCEDURE — 25010000002 METHYLPREDNISOLONE PER 40 MG: Performed by: STUDENT IN AN ORGANIZED HEALTH CARE EDUCATION/TRAINING PROGRAM

## 2023-11-25 PROCEDURE — 99222 1ST HOSP IP/OBS MODERATE 55: CPT | Performed by: STUDENT IN AN ORGANIZED HEALTH CARE EDUCATION/TRAINING PROGRAM

## 2023-11-25 RX ORDER — ONDANSETRON 2 MG/ML
4 INJECTION INTRAMUSCULAR; INTRAVENOUS EVERY 6 HOURS PRN
Status: DISCONTINUED | OUTPATIENT
Start: 2023-11-25 | End: 2023-11-26 | Stop reason: HOSPADM

## 2023-11-25 RX ORDER — HYDRALAZINE HYDROCHLORIDE 20 MG/ML
10 INJECTION INTRAMUSCULAR; INTRAVENOUS EVERY 6 HOURS PRN
Status: DISCONTINUED | OUTPATIENT
Start: 2023-11-25 | End: 2023-11-26 | Stop reason: HOSPADM

## 2023-11-25 RX ORDER — MULTIPLE VITAMINS W/ MINERALS TAB 9MG-400MCG
1 TAB ORAL DAILY
Status: DISCONTINUED | OUTPATIENT
Start: 2023-11-25 | End: 2023-11-26 | Stop reason: HOSPADM

## 2023-11-25 RX ORDER — BISACODYL 5 MG/1
5 TABLET, DELAYED RELEASE ORAL DAILY PRN
Status: DISCONTINUED | OUTPATIENT
Start: 2023-11-25 | End: 2023-11-26 | Stop reason: HOSPADM

## 2023-11-25 RX ORDER — HYDROCHLOROTHIAZIDE 25 MG/1
25 TABLET ORAL DAILY
Status: DISCONTINUED | OUTPATIENT
Start: 2023-11-25 | End: 2023-11-26 | Stop reason: HOSPADM

## 2023-11-25 RX ORDER — HEPARIN SODIUM 5000 [USP'U]/ML
5000 INJECTION, SOLUTION INTRAVENOUS; SUBCUTANEOUS EVERY 12 HOURS SCHEDULED
Status: DISCONTINUED | OUTPATIENT
Start: 2023-11-25 | End: 2023-11-26 | Stop reason: HOSPADM

## 2023-11-25 RX ORDER — INSULIN LISPRO 100 [IU]/ML
2-7 INJECTION, SOLUTION INTRAVENOUS; SUBCUTANEOUS
Status: DISCONTINUED | OUTPATIENT
Start: 2023-11-25 | End: 2023-11-26 | Stop reason: HOSPADM

## 2023-11-25 RX ORDER — METHYLPREDNISOLONE SODIUM SUCCINATE 40 MG/ML
40 INJECTION, POWDER, LYOPHILIZED, FOR SOLUTION INTRAMUSCULAR; INTRAVENOUS EVERY 8 HOURS
Status: DISCONTINUED | OUTPATIENT
Start: 2023-11-25 | End: 2023-11-26

## 2023-11-25 RX ORDER — ATORVASTATIN CALCIUM 10 MG/1
10 TABLET, FILM COATED ORAL NIGHTLY
Status: DISCONTINUED | OUTPATIENT
Start: 2023-11-25 | End: 2023-11-26 | Stop reason: HOSPADM

## 2023-11-25 RX ORDER — IPRATROPIUM BROMIDE AND ALBUTEROL SULFATE 2.5; .5 MG/3ML; MG/3ML
3 SOLUTION RESPIRATORY (INHALATION)
Status: DISCONTINUED | OUTPATIENT
Start: 2023-11-25 | End: 2023-11-26 | Stop reason: HOSPADM

## 2023-11-25 RX ORDER — DEXTROSE MONOHYDRATE 25 G/50ML
25 INJECTION, SOLUTION INTRAVENOUS
Status: DISCONTINUED | OUTPATIENT
Start: 2023-11-25 | End: 2023-11-26 | Stop reason: HOSPADM

## 2023-11-25 RX ORDER — NICOTINE POLACRILEX 4 MG
15 LOZENGE BUCCAL
Status: DISCONTINUED | OUTPATIENT
Start: 2023-11-25 | End: 2023-11-26 | Stop reason: HOSPADM

## 2023-11-25 RX ORDER — SODIUM CHLORIDE 9 MG/ML
40 INJECTION, SOLUTION INTRAVENOUS AS NEEDED
Status: DISCONTINUED | OUTPATIENT
Start: 2023-11-25 | End: 2023-11-26 | Stop reason: HOSPADM

## 2023-11-25 RX ORDER — POLYETHYLENE GLYCOL 3350 17 G/17G
17 POWDER, FOR SOLUTION ORAL DAILY PRN
Status: DISCONTINUED | OUTPATIENT
Start: 2023-11-25 | End: 2023-11-26 | Stop reason: HOSPADM

## 2023-11-25 RX ORDER — HYDROCODONE BITARTRATE AND ACETAMINOPHEN 5; 325 MG/1; MG/1
1 TABLET ORAL EVERY 4 HOURS PRN
Status: DISCONTINUED | OUTPATIENT
Start: 2023-11-25 | End: 2023-11-26 | Stop reason: HOSPADM

## 2023-11-25 RX ORDER — ACETAMINOPHEN 325 MG/1
650 TABLET ORAL EVERY 6 HOURS PRN
Status: DISCONTINUED | OUTPATIENT
Start: 2023-11-25 | End: 2023-11-25

## 2023-11-25 RX ORDER — PROCHLORPERAZINE EDISYLATE 5 MG/ML
5 INJECTION INTRAMUSCULAR; INTRAVENOUS EVERY 6 HOURS PRN
Status: DISCONTINUED | OUTPATIENT
Start: 2023-11-25 | End: 2023-11-26 | Stop reason: HOSPADM

## 2023-11-25 RX ORDER — ALBUTEROL SULFATE 2.5 MG/3ML
7.5 SOLUTION RESPIRATORY (INHALATION) CONTINUOUS
Status: DISPENSED | OUTPATIENT
Start: 2023-11-25 | End: 2023-11-25

## 2023-11-25 RX ORDER — ONDANSETRON 2 MG/ML
4 INJECTION INTRAMUSCULAR; INTRAVENOUS ONCE
Status: COMPLETED | OUTPATIENT
Start: 2023-11-25 | End: 2023-11-25

## 2023-11-25 RX ORDER — ACETAMINOPHEN 325 MG/1
650 TABLET ORAL EVERY 6 HOURS PRN
Status: DISCONTINUED | OUTPATIENT
Start: 2023-11-25 | End: 2023-11-26 | Stop reason: HOSPADM

## 2023-11-25 RX ORDER — SODIUM CHLORIDE 0.9 % (FLUSH) 0.9 %
10 SYRINGE (ML) INJECTION AS NEEDED
Status: DISCONTINUED | OUTPATIENT
Start: 2023-11-25 | End: 2023-11-26 | Stop reason: HOSPADM

## 2023-11-25 RX ORDER — BISACODYL 10 MG
10 SUPPOSITORY, RECTAL RECTAL DAILY PRN
Status: DISCONTINUED | OUTPATIENT
Start: 2023-11-25 | End: 2023-11-26 | Stop reason: HOSPADM

## 2023-11-25 RX ORDER — SODIUM CHLORIDE 0.9 % (FLUSH) 0.9 %
10 SYRINGE (ML) INJECTION EVERY 12 HOURS SCHEDULED
Status: DISCONTINUED | OUTPATIENT
Start: 2023-11-25 | End: 2023-11-26 | Stop reason: HOSPADM

## 2023-11-25 RX ORDER — ESCITALOPRAM OXALATE 10 MG/1
5 TABLET ORAL DAILY
Status: DISCONTINUED | OUTPATIENT
Start: 2023-11-25 | End: 2023-11-26 | Stop reason: HOSPADM

## 2023-11-25 RX ORDER — IBUPROFEN 600 MG/1
1 TABLET ORAL
Status: DISCONTINUED | OUTPATIENT
Start: 2023-11-25 | End: 2023-11-26 | Stop reason: HOSPADM

## 2023-11-25 RX ORDER — MIRTAZAPINE 15 MG/1
7.5 TABLET, FILM COATED ORAL NIGHTLY
Status: DISCONTINUED | OUTPATIENT
Start: 2023-11-25 | End: 2023-11-26 | Stop reason: HOSPADM

## 2023-11-25 RX ORDER — AMOXICILLIN 250 MG
2 CAPSULE ORAL 2 TIMES DAILY
Status: DISCONTINUED | OUTPATIENT
Start: 2023-11-25 | End: 2023-11-26 | Stop reason: HOSPADM

## 2023-11-25 RX ORDER — HYDROXYZINE HYDROCHLORIDE 25 MG/1
25 TABLET, FILM COATED ORAL EVERY 8 HOURS PRN
Status: DISCONTINUED | OUTPATIENT
Start: 2023-11-25 | End: 2023-11-26 | Stop reason: HOSPADM

## 2023-11-25 RX ADMIN — HEPARIN SODIUM 5000 UNITS: 5000 INJECTION INTRAVENOUS; SUBCUTANEOUS at 20:33

## 2023-11-25 RX ADMIN — Medication 10 ML: at 03:14

## 2023-11-25 RX ADMIN — METHYLPREDNISOLONE SODIUM SUCCINATE 40 MG: 40 INJECTION INTRAMUSCULAR; INTRAVENOUS at 14:45

## 2023-11-25 RX ADMIN — METHYLPREDNISOLONE SODIUM SUCCINATE 40 MG: 40 INJECTION INTRAMUSCULAR; INTRAVENOUS at 22:31

## 2023-11-25 RX ADMIN — MIRTAZAPINE 7.5 MG: 15 TABLET, FILM COATED ORAL at 20:33

## 2023-11-25 RX ADMIN — MULTIPLE VITAMINS W/ MINERALS TAB 1 TABLET: TAB at 16:36

## 2023-11-25 RX ADMIN — Medication 10 ML: at 20:33

## 2023-11-25 RX ADMIN — Medication 10 ML: at 09:41

## 2023-11-25 RX ADMIN — IPRATROPIUM BROMIDE AND ALBUTEROL SULFATE 3 ML: .5; 3 SOLUTION RESPIRATORY (INHALATION) at 06:48

## 2023-11-25 RX ADMIN — IPRATROPIUM BROMIDE AND ALBUTEROL SULFATE 3 ML: .5; 3 SOLUTION RESPIRATORY (INHALATION) at 18:59

## 2023-11-25 RX ADMIN — HEPARIN SODIUM 5000 UNITS: 5000 INJECTION INTRAVENOUS; SUBCUTANEOUS at 09:40

## 2023-11-25 RX ADMIN — HYDROCHLOROTHIAZIDE 25 MG: 25 TABLET ORAL at 17:17

## 2023-11-25 RX ADMIN — ATORVASTATIN CALCIUM 10 MG: 10 TABLET, FILM COATED ORAL at 20:33

## 2023-11-25 RX ADMIN — HYDROCODONE BITARTRATE AND ACETAMINOPHEN 1 TABLET: 5; 325 TABLET ORAL at 20:36

## 2023-11-25 RX ADMIN — ONDANSETRON 4 MG: 2 INJECTION INTRAMUSCULAR; INTRAVENOUS at 00:45

## 2023-11-25 RX ADMIN — ACETAMINOPHEN 650 MG: 325 TABLET ORAL at 03:13

## 2023-11-25 RX ADMIN — ESCITALOPRAM OXALATE 5 MG: 10 TABLET ORAL at 17:17

## 2023-11-25 RX ADMIN — ACETAMINOPHEN 650 MG: 325 TABLET ORAL at 09:49

## 2023-11-25 RX ADMIN — METHYLPREDNISOLONE SODIUM SUCCINATE 40 MG: 40 INJECTION INTRAMUSCULAR; INTRAVENOUS at 06:21

## 2023-11-25 RX ADMIN — IPRATROPIUM BROMIDE AND ALBUTEROL SULFATE 3 ML: .5; 3 SOLUTION RESPIRATORY (INHALATION) at 14:02

## 2023-11-25 RX ADMIN — ALBUTEROL SULFATE 7.5 MG: 2.5 SOLUTION RESPIRATORY (INHALATION) at 00:20

## 2023-11-25 RX ADMIN — ACETAMINOPHEN 650 MG: 325 TABLET ORAL at 14:46

## 2023-11-25 NOTE — ED PROVIDER NOTES
Time: 10:29 PM EST  Date of encounter:  11/24/2023  Independent Historian/Clinical History and Information was obtained by:   Patient    History is limited by: N/A    Chief Complaint: shortness of breath      History of Present Illness:  Patient is a 63 y.o. year old female who presents to the emergency department for evaluation of Shortness of breath.  Patient states symptoms have been getting progressively worse over the last week.  She reports a productive cough with yellow sputum.  She denies any known history of lung disease but does smoke.  No fever or chills.  She does report some chest tightness with shortness of breath.  No nausea or vomiting.  No known sick contacts.    HPI    Patient Care Team  Primary Care Provider: Anisa Jackson APRN    Past Medical History:     No Known Allergies  Past Medical History:   Diagnosis Date    Hypertension      Past Surgical History:   Procedure Laterality Date    CYST REMOVAL Left     breast    TUBAL ABDOMINAL LIGATION       History reviewed. No pertinent family history.    Home Medications:  Prior to Admission medications    Medication Sig Start Date End Date Taking? Authorizing Provider   albuterol sulfate  (90 Base) MCG/ACT inhaler Inhale 2 puffs Every 4 (Four) Hours As Needed for Wheezing or Shortness of Air. 7/25/23   Greg Ponce MD   albuterol sulfate  (90 Base) MCG/ACT inhaler Inhale 2 puffs into the lungs every 4 (four) hours as needed for Shortness of Air. 11/6/23      atorvastatin (LIPITOR) 10 MG tablet Take 1 tablet by mouth nightly. 11/6/23      budesonide-formoterol (Symbicort) 80-4.5 MCG/ACT inhaler Inhale 2 puffs 2 (Two) Times a Day for 30 days. 7/25/23 8/24/23  Greg Ponce MD   escitalopram (LEXAPRO) 5 MG tablet Take 1 tablet by mouth daily. 11/6/23      hydroCHLOROthiazide (HYDRODIURIL) 25 MG tablet Take 1 tablet by mouth Daily.    Provider, MD Steven   hydroCHLOROthiazide (HYDRODIURIL) 25 MG tablet Take 1 tablet  "by mouth daily. 11/6/23      hydrOXYzine (ATARAX) 25 MG tablet Take 1 tablet by mouth every 8 (eight) hours as needed for Anxiety. 11/6/23      hydrOXYzine (ATARAX) 25 MG tablet Take 1 tablet by mouth Every 8 (Eight) Hours As Needed. 11/6/23   ProviderSteven MD   metoprolol succinate XL (TOPROL-XL) 25 MG 24 hr tablet Take 1 tablet by mouth Daily for 30 days. 7/25/23 8/24/23  Greg Ponce MD   mirtazapine (REMERON) 7.5 MG tablet Take 1 tablet by mouth nightly. 11/6/23      multivitamin with minerals tablet tablet Take 1 tablet by mouth Daily.    Provider, MD Steven   nitroglycerin (NITROSTAT) 0.4 MG SL tablet Place 1 tablet under the tongue Every 5 (Five) Minutes As Needed for Chest Pain. Take no more than 3 doses in 15 minutes. 7/25/23   Greg Ponce MD        Social History:   Social History     Tobacco Use    Smoking status: Every Day     Packs/day: .5     Types: Cigarettes    Smokeless tobacco: Never   Vaping Use    Vaping Use: Never used   Substance Use Topics    Alcohol use: Not Currently    Drug use: Yes     Types: Marijuana         Review of Systems:  Review of Systems   Constitutional:  Negative for chills and fever.   HENT:  Negative for congestion, ear pain and sore throat.    Eyes:  Negative for pain.   Respiratory:  Positive for cough, chest tightness and shortness of breath.    Cardiovascular:  Negative for chest pain.   Gastrointestinal:  Negative for abdominal pain, diarrhea, nausea and vomiting.   Genitourinary:  Negative for flank pain and hematuria.   Musculoskeletal:  Negative for joint swelling.   Skin:  Negative for pallor.   Neurological:  Negative for seizures and headaches.   All other systems reviewed and are negative.       Physical Exam:  /96   Pulse 64   Temp 97.7 °F (36.5 °C) (Oral)   Resp 21   Ht 180.3 cm (71\")   SpO2 91%   BMI 15.90 kg/m²     Physical Exam  Constitutional:       Appearance: Normal appearance.   HENT:      Head: Normocephalic and " atraumatic.      Nose: Nose normal.      Mouth/Throat:      Mouth: Mucous membranes are moist.   Eyes:      Extraocular Movements: Extraocular movements intact.      Conjunctiva/sclera: Conjunctivae normal.      Pupils: Pupils are equal, round, and reactive to light.   Cardiovascular:      Rate and Rhythm: Normal rate and regular rhythm.      Pulses: Normal pulses.      Heart sounds: Normal heart sounds.   Pulmonary:      Effort: Pulmonary effort is normal.      Breath sounds: Normal breath sounds. Decreased breath sounds and wheezing present.   Abdominal:      General: There is no distension.      Palpations: Abdomen is soft.      Tenderness: There is no abdominal tenderness.   Musculoskeletal:         General: Normal range of motion.      Cervical back: Normal range of motion.   Skin:     General: Skin is warm and dry.      Capillary Refill: Capillary refill takes less than 2 seconds.   Neurological:      General: No focal deficit present.      Mental Status: She is alert and oriented to person, place, and time. Mental status is at baseline.   Psychiatric:         Mood and Affect: Mood normal.         Behavior: Behavior normal.                  Procedures:  Procedures      Medical Decision Making:      Comorbidities that affect care:    Hypertension, Smoking    External Notes reviewed:    Previous Clinic Note: Patient seen by cardiology on 8/9/2023 for primary hypertension and hyperlipidemia.      The following orders were placed and all results were independently analyzed by me:  Orders Placed This Encounter   Procedures    COVID-19, FLU A/B, RSV PCR 1 HR TAT - Swab, Nasopharynx    Legionella Antigen, Urine - Urine, Urine, Clean Catch    MRSA Screen, PCR (Inpatient) - Swab, Nares    Mycoplasma Pneumoniae Antibody, IgM - Blood, Arm, Left    Respiratory Culture - Sputum, Cough    S. Pneumo Ag Urine or CSF - Urine, Urine, Clean Catch    XR Chest 1 View    [place] Draw    Comprehensive Metabolic Panel    BNP     Single High Sensitivity Troponin T    CBC Auto Differential    NPO Diet NPO Type: Strict NPO    Undress & Gown    Continuous Pulse Oximetry    Vital Signs    Code Status and Medical Interventions:    Inpatient Hospitalist Consult    Oxygen Therapy- Nasal Cannula; Titrate 1-6 LPM Per SpO2; 90 - 95%    ECG 12 Lead ED Triage Standing Order; SOA    Insert Peripheral IV    Initiate Observation Status    CBC & Differential    Green Top (Gel)    Lavender Top    Gold Top - SST    Light Blue Top       Medications Given in the Emergency Department:  Medications   sodium chloride 0.9 % flush 10 mL (has no administration in time range)   albuterol (PROVENTIL) nebulizer solution 0.083% 2.5 mg/3mL (7.5 mg Nebulization New Bag 11/25/23 0020)   ipratropium-albuterol (DUO-NEB) nebulizer solution 3 mL (has no administration in time range)   methylPREDNISolone sodium succinate (SOLU-Medrol) injection 40 mg (has no administration in time range)   hydrALAZINE (APRESOLINE) injection 10 mg (has no administration in time range)   ondansetron (ZOFRAN) injection 4 mg (has no administration in time range)   ipratropium-albuterol (DUO-NEB) nebulizer solution 3 mL (3 mL Nebulization Given 11/24/23 2154)   ipratropium-albuterol (DUO-NEB) nebulizer solution 3 mL (3 mL Nebulization Given 11/24/23 2153)   methylPREDNISolone sodium succinate (SOLU-Medrol) injection 125 mg (125 mg Intravenous Given 11/24/23 2203)   ipratropium-albuterol (DUO-NEB) nebulizer solution 3 mL (3 mL Nebulization Given 11/24/23 2307)   ipratropium-albuterol (DUO-NEB) nebulizer solution 3 mL (3 mL Nebulization Given 11/24/23 2328)        ED Course:    ED Course as of 11/25/23 0023   Fri Nov 24, 2023 2231 ECG 12 Lead ED Triage Standing Order; SOA  Sinus rhythm rate of 69.  No acute ST elevation.  Normal OK interval.  Normal QTc.  EKG interpreted by me [LD]      ED Course User Index  [LD] Anny Lindquist MD       Labs:    Lab Results (last 24 hours)       Procedure  Component Value Units Date/Time    CBC & Differential [[id number]]  (Abnormal) Collected: 11/24/23 2052    Specimen: Blood Updated: 11/24/23 2104    Narrative:      The following orders were created for panel order CBC & Differential.  Procedure                               Abnormality         Status                     ---------                               -----------         ------                     CBC Auto Differential[[id number]]        Abnormal            Final result                 Please view results for these tests on the individual orders.    Comprehensive Metabolic Panel [[id number]]  (Abnormal) Collected: 11/24/23 2052    Specimen: Blood Updated: 11/24/23 2129     Glucose 133 mg/dL      BUN 8 mg/dL      Creatinine 0.63 mg/dL      Sodium 142 mmol/L      Potassium 3.6 mmol/L      Chloride 102 mmol/L      CO2 25.3 mmol/L      Calcium 10.1 mg/dL      Total Protein 7.7 g/dL      Albumin 5.1 g/dL      ALT (SGPT) 12 U/L      AST (SGOT) 18 U/L      Alkaline Phosphatase 72 U/L      Total Bilirubin 0.3 mg/dL      Globulin 2.6 gm/dL      A/G Ratio 2.0 g/dL      BUN/Creatinine Ratio 12.7     Anion Gap 14.7 mmol/L      eGFR 100.4 mL/min/1.73     Narrative:      GFR Normal >60  Chronic Kidney Disease <60  Kidney Failure <15      BNP [[id number]]  (Normal) Collected: 11/24/23 2052    Specimen: Blood Updated: 11/24/23 2126     proBNP 145.1 pg/mL     Narrative:      This assay is used as an aid in the diagnosis of individuals suspected of having heart failure. It can be used as an aid in the diagnosis of acute decompensated heart failure (ADHF) in patients presenting with signs and symptoms of ADHF to the emergency department (ED). In addition, NT-proBNP of <300 pg/mL indicates ADHF is not likely.    Age Range Result Interpretation  NT-proBNP Concentration (pg/mL:      <50             Positive            >450                   Gray                 300-450                    Negative             <300    50-75            Positive            >900                  Gray                300-900                  Negative            <300      >75             Positive            >1800                  Gray                300-1800                  Negative            <300    Single High Sensitivity Troponin T [[id number]]  (Normal) Collected: 11/24/23 2052    Specimen: Blood Updated: 11/24/23 2128     HS Troponin T <6 ng/L     Narrative:      High Sensitive Troponin T Reference Range:  <14.0 ng/L- Negative Female for AMI  <22.0 ng/L- Negative Male for AMI  >=14 - Abnormal Female indicating possible myocardial injury.  >=22 - Abnormal Male indicating possible myocardial injury.   Clinicians would have to utilize clinical acumen, EKG, Troponin, and serial changes to determine if it is an Acute Myocardial Infarction or myocardial injury due to an underlying chronic condition.         CBC Auto Differential [[id number]]  (Abnormal) Collected: 11/24/23 2052    Specimen: Blood Updated: 11/24/23 2104     WBC 8.89 10*3/mm3      RBC 4.57 10*6/mm3      Hemoglobin 13.5 g/dL      Hematocrit 40.4 %      MCV 88.4 fL      MCH 29.5 pg      MCHC 33.4 g/dL      RDW 16.1 %      RDW-SD 52.4 fl      MPV 9.6 fL      Platelets 390 10*3/mm3      Neutrophil % 60.7 %      Lymphocyte % 27.3 %      Monocyte % 7.3 %      Eosinophil % 3.9 %      Basophil % 0.6 %      Immature Grans % 0.2 %      Neutrophils, Absolute 5.39 10*3/mm3      Lymphocytes, Absolute 2.43 10*3/mm3      Monocytes, Absolute 0.65 10*3/mm3      Eosinophils, Absolute 0.35 10*3/mm3      Basophils, Absolute 0.05 10*3/mm3      Immature Grans, Absolute 0.02 10*3/mm3      nRBC 0.0 /100 WBC     COVID-19, FLU A/B, RSV PCR 1 HR TAT - Swab, Nasopharynx [[id number]]  (Normal) Collected: 11/24/23 2053    Specimen: Swab from Nasopharynx Updated: 11/24/23 2223     COVID19 Not Detected     Influenza A PCR Not Detected     Influenza B PCR Not Detected     RSV, PCR Not Detected    Narrative:      Fact sheet for providers:  https://www.fda.gov/media/358452/download    Fact sheet for patients: https://www.fda.gov/media/685775/download    Test performed by PCR.             Imaging:    XR Chest 1 View    Result Date: 11/24/2023  PROCEDURE: XR CHEST 1 VW  COMPARISON: [facility], CR, XR CHEST 1 VW, 7/21/2023, 1:37.  [facility], CT, CT CHEST W CONTRAST DIAGNOSTIC, 7/21/2023, 22:37.  INDICATIONS: SOA  FINDINGS:  Lungs are hyperexpanded and appear clear.  No pneumothorax or large pleural effusion is seen.  Cardiomediastinal contours appear stable.        No acute cardiopulmonary abnormality is identified.       ROBINSON MARIE MD       Electronically Signed and Approved By: ROBINSON MARIE MD on 11/24/2023 at 21:13                Differential Diagnosis and Discussion:    Dyspnea: Differential diagnosis includes but is not limited to metabolic acidosis, neurological disorders, psychogenic, asthma, pneumothorax, upper airway obstruction, COPD, pneumonia, noncardiogenic pulmonary edema, interstitial lung disease, anemia, congestive heart failure, and pulmonary embolism    All labs were reviewed and interpreted by me.  All X-rays impressions were independently interpreted by me.  EKG was interpreted by me.    MDM  Number of Diagnoses or Management Options  Diagnosis management comments: Patient is afebrile.  O2 sat low 90s on room air.  She was placed on supplemental oxygen.  Patient has decreased breath sounds throughout.  She was given multiple nebulizer treatments with minimal improvement in her symptoms.  Chest x-ray did not show acute finding.  With persistent symptoms discussed patient with hospitalist and will admit for further care.       Amount and/or Complexity of Data Reviewed  Clinical lab tests: reviewed  Tests in the radiology section of CPT®: reviewed  Review and summarize past medical records: yes  Independent visualization of images, tracings, or specimens: yes    Risk of Complications, Morbidity,  and/or Mortality  Presenting problems: moderate  Management options: moderate                 Patient Care Considerations:    CT CHEST: I considered ordering a CT scan of the chest, however not emergently indicated can obtain as in patient if needed      Consultants/Shared Management Plan:    Hospitalist: I have discussed the case with Dr Julio who agrees to accept the patient for admission.    Social Determinants of Health:    Patient is independent, reliable, and has access to care.       Disposition and Care Coordination:    Admit:   Through independent evaluation of the patient's history, physical, and imperical data, the patient meets criteria for observation/admission to the hospital.        Final diagnoses:   COPD with acute exacerbation        ED Disposition       ED Disposition   Decision to Admit    Condition   --    Comment   Level of Care: Med/Surg [1]   Diagnosis: COPD exacerbation [272890]   Admitting Physician: ROBERTO JULIO [[id number]]   Attending Physician: ROBERTO JULIO [[id number]]                 This medical record created using voice recognition software.             Anny Lindquist MD  11/25/23 0023

## 2023-11-25 NOTE — H&P
[facility]    Patient Identification:  Name:  Gloria Sunshine  Age:  63 y.o.  Sex:  female  :  1960  MRN:  [id number]   Visit Number:  [id number]  Admit Date: 2023   Room number:    Primary Care Physician:  Anisa Jackson, ELENA    Date of Admission: 2023     Subjective     Chief complaint:    Chief Complaint   Patient presents with    Shortness of Breath    Cough       History of presenting illness:    This is a 63-year-old female with a past medical history of COPD, hypertension, and hyperlipidemia presenting with shortness of breath. Of note, she was recently on a cruise and returned home on . Patient states that since then, she has been experiencing worsening shortness of breath. She is also complaining of a productive cough with yellow sputum along with nasal congestion.  She initially presented to an urgent care center and was found to have an O2 saturation of 88% on room air.  The patient was placed on tutors nasal cannula and was transferred to our hospital for further management.    In ED, blood pressure 161/100, heart rate 67, respiratory rate 24, temperature 97.7 O2 saturation 91% on 2 L nasal cannula.  Labs on arrival showed a sodium 142, potassium 3.6, BUN 8,  creatinine  0.6, WBC 8.9, hemoglobin 13.5 and platelet count 390.  Chest x-ray showed no acute cardiopulmonary abnormality.    ---------------------------------------------------------------------------------------------------------------------   Review of Systems   Constitutional:  Positive for fatigue. Negative for appetite change.   HENT:  Negative for drooling, postnasal drip and tinnitus.    Eyes:  Negative for itching.   Respiratory:  Positive for cough and shortness of breath.    Cardiovascular:  Negative for leg swelling.   Gastrointestinal:  Negative for anal bleeding and nausea.   Endocrine: Negative for polydipsia.   Genitourinary:  Negative for  dyspareunia and pelvic pain.   Musculoskeletal:  Negative for back pain.   Neurological:  Negative for seizures and facial asymmetry.   Psychiatric/Behavioral:  Negative for confusion.      ---------------------------------------------------------------------------------------------------------------------   Past Medical History:   Diagnosis Date    Hypertension      Past Surgical History:   Procedure Laterality Date    CYST REMOVAL Left     breast    TUBAL ABDOMINAL LIGATION       History reviewed. No pertinent family history.  Social History     Socioeconomic History    Marital status:    Tobacco Use    Smoking status: Every Day     Packs/day: .5     Types: Cigarettes    Smokeless tobacco: Never   Vaping Use    Vaping Use: Never used   Substance and Sexual Activity    Alcohol use: Not Currently    Drug use: Yes     Types: Marijuana     ---------------------------------------------------------------------------------------------------------------------   Allergies:  Patient has no known allergies.  ---------------------------------------------------------------------------------------------------------------------   Medications below are reported home medications pulling from within the system; at this time, these medications have not been reconciled unless otherwise specified and are in the verification process for further verifcation as current home medications.      Prior to Admission Medications       Prescriptions Last Dose Informant Patient Reported? Taking?    albuterol sulfate  (90 Base) MCG/ACT inhaler   No No    Inhale 2 puffs Every 4 (Four) Hours As Needed for Wheezing or Shortness of Air.    albuterol sulfate  (90 Base) MCG/ACT inhaler   No No    Inhale 2 puffs into the lungs every 4 (four) hours as needed for Shortness of Air.    atorvastatin (LIPITOR) 10 MG tablet   No No    Take 1 tablet by mouth nightly.    budesonide-formoterol (Symbicort) 80-4.5 MCG/ACT inhaler   No No     Inhale 2 puffs 2 (Two) Times a Day for 30 days.    escitalopram (LEXAPRO) 5 MG tablet   No No    Take 1 tablet by mouth daily.    hydroCHLOROthiazide (HYDRODIURIL) 25 MG tablet   Yes No    Take 1 tablet by mouth Daily.    hydroCHLOROthiazide (HYDRODIURIL) 25 MG tablet   No No    Take 1 tablet by mouth daily.    hydrOXYzine (ATARAX) 25 MG tablet   No No    Take 1 tablet by mouth every 8 (eight) hours as needed for Anxiety.    hydrOXYzine (ATARAX) 25 MG tablet   Yes No    Take 1 tablet by mouth Every 8 (Eight) Hours As Needed.    metoprolol succinate XL (TOPROL-XL) 25 MG 24 hr tablet   No No    Take 1 tablet by mouth Daily for 30 days.    mirtazapine (REMERON) 7.5 MG tablet   No No    Take 1 tablet by mouth nightly.    multivitamin with minerals tablet tablet   Yes No    Take 1 tablet by mouth Daily.    nitroglycerin (NITROSTAT) 0.4 MG SL tablet   No No    Place 1 tablet under the tongue Every 5 (Five) Minutes As Needed for Chest Pain. Take no more than 3 doses in 15 minutes.          Objective     Vital Signs:  Temp:  [97.3 °F (36.3 °C)-97.7 °F (36.5 °C)] 97.7 °F (36.5 °C)  Heart Rate:  [61-87] 64  Resp:  [20-24] 21  BP: (146-200)/() 146/96    Mean Arterial Pressure (Non-Invasive) for the past 24 hrs (Last 3 readings):   Noninvasive MAP (mmHg)   11/24/23 2345 112   11/24/23 2300 110   11/24/23 2200 117     SpO2:  [91 %-98 %] 91 %  on  Flow (L/min):  [2] 2;   Device (Oxygen Therapy): room air  Body mass index is 15.9 kg/m².    Wt Readings from Last 3 Encounters:   11/24/23 51.7 kg (114 lb)   08/09/23 54 kg (119 lb)   07/21/23 56.4 kg (124 lb 5.4 oz)      ----------------------------------------------------------------------------------------------------------------------  PHYSICAL EXAMINATION:  GENERAL: The patient is well developed and nontoxic.  HEENT: Nonicteric sclerae, PERRLA, EOMI. Oropharynx clear. Moist mucous membranes. Conjunctivae appear well perfused.  CHEST: Decreased breath sound bilateral  HEART:  "Regular rate and rhythm without murmurs.  LUNGS: Clear to auscultation bilaterally.  ABDOMEN: Soft, positive bowel sounds, nontender, no organomegaly.  RECTAL: Deferred.  SKIN: No rash, no excessive bruising, petechiae, or purpura.  NEUROLOGIC: Cranial nerves II-XII intact without motor/sensory deficit.    ---------------------------------------------------------------------------------------------------------------------  --------------------------------------------------------------------------------------------------------------------  LABS:    CBC and coagulation:  Results from last 7 days   Lab Units 11/24/23 2052   WBC 10*3/mm3 8.89   HEMOGLOBIN g/dL 13.5   HEMATOCRIT % 40.4   MCV fL 88.4   MCHC g/dL 33.4   PLATELETS 10*3/mm3 390     Acid/base balance:      Renal and electrolytes:  Results from last 7 days   Lab Units 11/24/23 2052   SODIUM mmol/L 142   POTASSIUM mmol/L 3.6   CHLORIDE mmol/L 102   CO2 mmol/L 25.3   BUN mg/dL 8   CREATININE mg/dL 0.63   CALCIUM mg/dL 10.1   GLUCOSE mg/dL 133*     Estimated Creatinine Clearance: 75.6 mL/min (by C-G formula based on SCr of 0.63 mg/dL).    Liver and pancreatic function:  Results from last 7 days   Lab Units 11/24/23 2052   ALBUMIN g/dL 5.1   BILIRUBIN mg/dL 0.3   ALK PHOS U/L 72   AST (SGOT) U/L 18   ALT (SGPT) U/L 12     Endocrine function:  No results found for: \"HGBA1C\"  Point of care bedside glucose levels:      Lab Results   Component Value Date    TSH 4.630 10/07/2020     Cardiac:  Results from last 7 days   Lab Units 11/24/23 2052   HSTROP T ng/L <6   PROBNP pg/mL 145.1       Cultures:  No results found for: \"COLORU\", \"CLARITYU\", \"SPECGRAV\", \"PHUR\", \"PROTEINUR\", \"GLUCOSEU\", \"KETONESU\", \"BLOODU\", \"NITRITEU\", \"LEUKOCYTESUR\", \"BILIRUBINUR\", \"UROBILINOGEN\", \"RBCUA\", \"WBCUA\", \"BACTERIA\", \"UACOMMENT\"  Microbiology Results (last 10 days)       Procedure Component Value - Date/Time    COVID-19, FLU A/B, RSV PCR 1 HR TAT - Swab, Nasopharynx [[id number]]  (Normal) " "Collected: 11/24/23 2053    Lab Status: Final result Specimen: Swab from Nasopharynx Updated: 11/24/23 2223     COVID19 Not Detected     Influenza A PCR Not Detected     Influenza B PCR Not Detected     RSV, PCR Not Detected    Narrative:      Fact sheet for providers: https://www.fda.gov/media/963021/download    Fact sheet for patients: https://www.fda.gov/media/307803/download    Test performed by PCR.            No results found for: \"PREGTESTUR\", \"PREGSERUM\", \"HCG\", \"HCGQUANT\"  Pain Management Panel           No data to display                I have personally looked at the labs and they are summarized above.  ----------------------------------------------------------------------------------------------------------------------  Detailed radiology reports for the last 24 hours:    Imaging Results (Last 24 Hours)       Procedure Component Value Units Date/Time    XR Chest 1 View [[id number]] Collected: 11/24/23 2113     Updated: 11/24/23 2116    Narrative:      PROCEDURE: XR CHEST 1 VW     COMPARISON: [facility], CR, XR CHEST 1 VW, 7/21/2023, 1:37.  [facility], CT, CT CHEST W CONTRAST DIAGNOSTIC, 7/21/2023, 22:37.     INDICATIONS: SOA     FINDINGS:   Lungs are hyperexpanded and appear clear.  No pneumothorax or large pleural effusion is seen.    Cardiomediastinal contours appear stable.       Impression:         No acute cardiopulmonary abnormality is identified.                  ROBINSON MARIE MD         Electronically Signed and Approved By: ROBINSON MARIE MD on 11/24/2023 at 21:13                           Final impressions for the last 30 days of radiology reports:    XR Chest 1 View    Result Date: 11/24/2023    No acute cardiopulmonary abnormality is identified.       ROBINSON MARIE MD       Electronically Signed and Approved By: ROBINSON MARIE MD on 11/24/2023 at 21:13                Assessment & Plan     This is a 63-year-old female with a past medical history of COPD, " hypertension, and hyperlipidemia presenting with shortness of breath.     Assessment:  COPD exacerbation  Nausea/Vomiting  Hypoxia  Hypertension  Hyperlipidemia    Plan:  -Admit to obs  -DuoNebs every 4 hours  -Titrate oxygen to SaO2 of 88% 92%  -Follow-up sputum and blood cultures  -r/o MRSA, Legionella, Strep pneumo, Mycoplasma  -Solu-Medrol 40 q8h  -Will hold off on starting any antibiotics at this time.  -Jenise Nugent MD  [facility]  11/25/23  00:21 EST

## 2023-11-25 NOTE — PROGRESS NOTES
[facility]   Hospitalist Progress Note  Date: 2023  Patient Name: Gloria Sunshine  : 1960  MRN: [id number]  Date of admission: 2023  Room/Bed: [facility]/      Subjective   Subjective     Chief Complaint: Shortness of breath, cough    Summary:Gloria Sunshine is a 63 y.o. female with history of COPD (not on home oxygen), hypertension and hyperlipidemia who presented with complaints of shortness of breath and cough.  She recently came back from cruise after which she started experiencing worsening shortness of breath and productive cough with yellowish sputum production.  She went to urgent care where she was found to be hypoxic with SpO2 88% on room air after which she was advised to come to ED.  On presentation, she was hypertensive with blood pressure 200/98 and saturating 91% at 2 L/min of oxygen via NC.  Patient was admitted as a case of COPD exacerbation.    Interval Followup: No acute events overnight.  Patient stated that she feels better compared to yesterday.  She did have cough spells last night and is having some pain in her belly and lower chest due to cough.  Minimal relief with Tylenol.  Denied any difficulty breathing, fever, chills, rigors or chest pain today.  Lying in bed comfortably on 2 L/min of oxygen via NC.    Review of Systems    All systems reviewed and negative except for what is outlined above.      Objective   Objective     Vitals:   Temp:  [97.3 °F (36.3 °C)-98.4 °F (36.9 °C)] 97.6 °F (36.4 °C)  Heart Rate:  [61-87] 73  Resp:  [20-24] 20  BP: (133-200)/() 138/82  Flow (L/min):  [2] 2    Physical Exam   General: Awake, alert, NAD  Cardiovascular: RRR, no murmurs   Pulmonary: Bilateral decreased air entry; no wheezes; no conversational dyspnea; on 2 L/min of oxygen via NC.  Gastrointestinal: S/ND/NT, +BS  Musculoskeletal: No gross deformities  Skin: No jaundice, no rash on exposed skin appreciated  Neuro: Alert, awake, oriented x 3; speech  clear; no tremor  : No Lyman catheter; no suprapubic tenderness    Result Review    Result Review:  I have personally reviewed these results:  [x]  Laboratory      Lab 11/25/23 0449 11/24/23 2052   WBC 8.23 8.89   HEMOGLOBIN 12.8 13.5   HEMATOCRIT 38.8 40.4   PLATELETS 362 390   NEUTROS ABS 7.10* 5.39   IMMATURE GRANS (ABS) 0.02 0.02   LYMPHS ABS 0.99 2.43   MONOS ABS 0.08* 0.65   EOS ABS 0.02 0.35   MCV 88.6 88.4         Lab 11/25/23 0449 11/24/23 2052   SODIUM 139 142   POTASSIUM 3.8 3.6   CHLORIDE 99 102   CO2 23.6 25.3   ANION GAP 16.4* 14.7   BUN 8 8   CREATININE 0.67 0.63   EGFR 99.0 100.4   GLUCOSE 219* 133*   CALCIUM 9.7 10.1         Lab 11/24/23 2052   TOTAL PROTEIN 7.7   ALBUMIN 5.1   GLOBULIN 2.6   ALT (SGPT) 12   AST (SGOT) 18   BILIRUBIN 0.3   ALK PHOS 72         Lab 11/24/23 2052   PROBNP 145.1   HSTROP T <6                 Brief Urine Lab Results       None          [x]  Microbiology   Microbiology Results (last 10 days)       Procedure Component Value - Date/Time    Mycoplasma Pneumoniae Antibody, IgM - Blood, Arm, Left [[id number]]  (Normal) Collected: 11/25/23 0449    Lab Status: Final result Specimen: Blood from Arm, Left Updated: 11/25/23 0554     Mycoplasma pneumo IgM Negative    COVID-19, FLU A/B, RSV PCR 1 HR TAT - Swab, Nasopharynx [[id number]]  (Normal) Collected: 11/24/23 2053    Lab Status: Final result Specimen: Swab from Nasopharynx Updated: 11/24/23 2223     COVID19 Not Detected     Influenza A PCR Not Detected     Influenza B PCR Not Detected     RSV, PCR Not Detected    Narrative:      Fact sheet for providers: https://www.fda.gov/media/540969/download    Fact sheet for patients: https://www.fda.gov/media/579328/download    Test performed by PCR.          [x]  Radiology  XR Chest 1 View    Result Date: 11/24/2023    No acute cardiopulmonary abnormality is identified.       ROBINSON MARIE MD       Electronically Signed and Approved By: ROBINSON MARIE MD on 11/24/2023 at 21:13             []  EKG/Telemetry   []  Cardiology/Vascular   []  Pathology  []  Old records  []  Other:    Assessment & Plan   Assessment / Plan     Assessment:  Acute COPD exacerbation  Cough  Hypoxia  Hyperglycemia  History of COPD  History of hypertension  History of hyperlipidemia    Plan:  Patient currently being managed in hospitalist service.  Currently on 2 L/min of oxygen via NC.  Continue to wean as tolerated to maintain SpO2 88-92%.    COVID, influenza and RSV negative.  Legionella antigen, mycoplasma pneumo and strep pneumo negative.  Respiratory culture prelim report pending, will follow-up result.  Chest x-ray showed no acute cardiopulmonary abnormality.  Currently on methylprednisone 40 mg every 8 hours.  Will transition to oral prednisone 40 mg daily from tomorrow.  Continue DuoNebs.  Holding off antibiotics as less likely infectious etiology.  On Tylenol as needed for pain.  Will add PRN low-dose Norco as patient is complaining of abdominal and lower chest pain due to cough spells.   Elevated blood pressure resolved.  Will restart home dose of hydrochlorothiazide 25 mg daily.  Continue to monitor vitals.  On as needed IV hydralazine for SBP greater than 180.  Hyperglycemia of 219.  Will obtain HbA1c.  Started on insulin sliding scale.  Continue rest of the current management.  Nutrition consult placed for malnourishment.  Appreciate input.  Likely discharge in next 25 and 48 hours.      DVT prophylaxis:  Medical DVT prophylaxis orders are present.    CODE STATUS:   Level Of Support Discussed With: Patient  Code Status (Patient has no pulse and is not breathing): CPR (Attempt to Resuscitate)  Medical Interventions (Patient has pulse or is breathing): Full Support      Electronically signed by Caleb Day MD, 11/25/23, 7:57 AM EST.

## 2023-11-26 ENCOUNTER — READMISSION MANAGEMENT (OUTPATIENT)
Dept: CALL CENTER | Facility: HOSPITAL | Age: 63
End: 2023-11-26
Payer: COMMERCIAL

## 2023-11-26 VITALS
BODY MASS INDEX: 15.83 KG/M2 | DIASTOLIC BLOOD PRESSURE: 71 MMHG | OXYGEN SATURATION: 96 % | RESPIRATION RATE: 18 BRPM | TEMPERATURE: 98.1 F | HEART RATE: 83 BPM | SYSTOLIC BLOOD PRESSURE: 140 MMHG | HEIGHT: 71 IN | WEIGHT: 113.1 LBS

## 2023-11-26 LAB
ANION GAP SERPL CALCULATED.3IONS-SCNC: 8.8 MMOL/L (ref 5–15)
BACTERIA SPEC RESP CULT: NORMAL
BACTERIA SPEC RESP CULT: NORMAL
BASOPHILS # BLD AUTO: 0.05 10*3/MM3 (ref 0–0.2)
BASOPHILS NFR BLD AUTO: 0.3 % (ref 0–1.5)
BUN SERPL-MCNC: 13 MG/DL (ref 8–23)
BUN/CREAT SERPL: 18.3 (ref 7–25)
CALCIUM SPEC-SCNC: 9.8 MG/DL (ref 8.6–10.5)
CHLORIDE SERPL-SCNC: 101 MMOL/L (ref 98–107)
CO2 SERPL-SCNC: 28.2 MMOL/L (ref 22–29)
CREAT SERPL-MCNC: 0.71 MG/DL (ref 0.57–1)
DEPRECATED RDW RBC AUTO: 52.4 FL (ref 37–54)
EGFRCR SERPLBLD CKD-EPI 2021: 96.3 ML/MIN/1.73
EOSINOPHIL # BLD AUTO: 0.01 10*3/MM3 (ref 0–0.4)
EOSINOPHIL NFR BLD AUTO: 0.1 % (ref 0.3–6.2)
ERYTHROCYTE [DISTWIDTH] IN BLOOD BY AUTOMATED COUNT: 16.1 % (ref 12.3–15.4)
GLUCOSE BLDC GLUCOMTR-MCNC: 132 MG/DL (ref 70–99)
GLUCOSE BLDC GLUCOMTR-MCNC: 134 MG/DL (ref 70–99)
GLUCOSE SERPL-MCNC: 138 MG/DL (ref 65–99)
GRAM STN SPEC: NORMAL
HCT VFR BLD AUTO: 38.4 % (ref 34–46.6)
HGB BLD-MCNC: 12.4 G/DL (ref 12–15.9)
IMM GRANULOCYTES # BLD AUTO: 0.12 10*3/MM3 (ref 0–0.05)
IMM GRANULOCYTES NFR BLD AUTO: 0.6 % (ref 0–0.5)
LYMPHOCYTES # BLD AUTO: 1.69 10*3/MM3 (ref 0.7–3.1)
LYMPHOCYTES NFR BLD AUTO: 8.5 % (ref 19.6–45.3)
MAGNESIUM SERPL-MCNC: 2.1 MG/DL (ref 1.6–2.4)
MCH RBC QN AUTO: 28.6 PG (ref 26.6–33)
MCHC RBC AUTO-ENTMCNC: 32.3 G/DL (ref 31.5–35.7)
MCV RBC AUTO: 88.5 FL (ref 79–97)
MONOCYTES # BLD AUTO: 0.74 10*3/MM3 (ref 0.1–0.9)
MONOCYTES NFR BLD AUTO: 3.7 % (ref 5–12)
NEUTROPHILS NFR BLD AUTO: 17.19 10*3/MM3 (ref 1.7–7)
NEUTROPHILS NFR BLD AUTO: 86.8 % (ref 42.7–76)
NRBC BLD AUTO-RTO: 0 /100 WBC (ref 0–0.2)
PHOSPHATE SERPL-MCNC: 4.1 MG/DL (ref 2.5–4.5)
PLATELET # BLD AUTO: 342 10*3/MM3 (ref 140–450)
PMV BLD AUTO: 9.4 FL (ref 6–12)
POTASSIUM SERPL-SCNC: 4.6 MMOL/L (ref 3.5–5.2)
RBC # BLD AUTO: 4.34 10*6/MM3 (ref 3.77–5.28)
SODIUM SERPL-SCNC: 138 MMOL/L (ref 136–145)
WBC NRBC COR # BLD AUTO: 19.8 10*3/MM3 (ref 3.4–10.8)

## 2023-11-26 PROCEDURE — 80048 BASIC METABOLIC PNL TOTAL CA: CPT | Performed by: STUDENT IN AN ORGANIZED HEALTH CARE EDUCATION/TRAINING PROGRAM

## 2023-11-26 PROCEDURE — 82948 REAGENT STRIP/BLOOD GLUCOSE: CPT

## 2023-11-26 PROCEDURE — 94799 UNLISTED PULMONARY SVC/PX: CPT

## 2023-11-26 PROCEDURE — 84100 ASSAY OF PHOSPHORUS: CPT | Performed by: STUDENT IN AN ORGANIZED HEALTH CARE EDUCATION/TRAINING PROGRAM

## 2023-11-26 PROCEDURE — 94664 DEMO&/EVAL PT USE INHALER: CPT

## 2023-11-26 PROCEDURE — 25010000002 HEPARIN (PORCINE) PER 1000 UNITS: Performed by: STUDENT IN AN ORGANIZED HEALTH CARE EDUCATION/TRAINING PROGRAM

## 2023-11-26 PROCEDURE — 85025 COMPLETE CBC W/AUTO DIFF WBC: CPT | Performed by: STUDENT IN AN ORGANIZED HEALTH CARE EDUCATION/TRAINING PROGRAM

## 2023-11-26 PROCEDURE — 87205 SMEAR GRAM STAIN: CPT | Performed by: STUDENT IN AN ORGANIZED HEALTH CARE EDUCATION/TRAINING PROGRAM

## 2023-11-26 PROCEDURE — 25010000002 METHYLPREDNISOLONE PER 40 MG: Performed by: STUDENT IN AN ORGANIZED HEALTH CARE EDUCATION/TRAINING PROGRAM

## 2023-11-26 PROCEDURE — 25010000002 PROCHLORPERAZINE 10 MG/2ML SOLUTION

## 2023-11-26 PROCEDURE — G0378 HOSPITAL OBSERVATION PER HR: HCPCS

## 2023-11-26 PROCEDURE — 83735 ASSAY OF MAGNESIUM: CPT | Performed by: STUDENT IN AN ORGANIZED HEALTH CARE EDUCATION/TRAINING PROGRAM

## 2023-11-26 PROCEDURE — 96375 TX/PRO/DX INJ NEW DRUG ADDON: CPT

## 2023-11-26 PROCEDURE — 96372 THER/PROPH/DIAG INJ SC/IM: CPT

## 2023-11-26 PROCEDURE — 63710000001 PREDNISONE PER 1 MG: Performed by: STUDENT IN AN ORGANIZED HEALTH CARE EDUCATION/TRAINING PROGRAM

## 2023-11-26 PROCEDURE — 97161 PT EVAL LOW COMPLEX 20 MIN: CPT

## 2023-11-26 PROCEDURE — 96376 TX/PRO/DX INJ SAME DRUG ADON: CPT

## 2023-11-26 RX ORDER — PREDNISONE 20 MG/1
40 TABLET ORAL DAILY
Qty: 8 TABLET | Refills: 0 | Status: SHIPPED | OUTPATIENT
Start: 2023-11-27 | End: 2023-12-01

## 2023-11-26 RX ORDER — PREDNISONE 20 MG/1
40 TABLET ORAL DAILY
Qty: 8 TABLET | Refills: 0 | Status: SHIPPED | OUTPATIENT
Start: 2023-11-27 | End: 2023-11-26 | Stop reason: SDUPTHER

## 2023-11-26 RX ORDER — PREDNISONE 20 MG/1
40 TABLET ORAL DAILY
Status: DISCONTINUED | OUTPATIENT
Start: 2023-11-26 | End: 2023-11-26 | Stop reason: HOSPADM

## 2023-11-26 RX ORDER — ALBUTEROL SULFATE 90 UG/1
2 AEROSOL, METERED RESPIRATORY (INHALATION) EVERY 4 HOURS PRN
Qty: 18 G | Refills: 0 | Status: SHIPPED | OUTPATIENT
Start: 2023-11-26

## 2023-11-26 RX ADMIN — PROCHLORPERAZINE EDISYLATE 5 MG: 5 INJECTION INTRAMUSCULAR; INTRAVENOUS at 08:41

## 2023-11-26 RX ADMIN — IPRATROPIUM BROMIDE AND ALBUTEROL SULFATE 3 ML: .5; 3 SOLUTION RESPIRATORY (INHALATION) at 09:21

## 2023-11-26 RX ADMIN — ESCITALOPRAM OXALATE 5 MG: 10 TABLET ORAL at 08:35

## 2023-11-26 RX ADMIN — HYDROCODONE BITARTRATE AND ACETAMINOPHEN 1 TABLET: 5; 325 TABLET ORAL at 08:42

## 2023-11-26 RX ADMIN — MULTIPLE VITAMINS W/ MINERALS TAB 1 TABLET: TAB at 08:35

## 2023-11-26 RX ADMIN — IPRATROPIUM BROMIDE AND ALBUTEROL SULFATE 3 ML: .5; 3 SOLUTION RESPIRATORY (INHALATION) at 00:35

## 2023-11-26 RX ADMIN — PREDNISONE 40 MG: 20 TABLET ORAL at 16:04

## 2023-11-26 RX ADMIN — HEPARIN SODIUM 5000 UNITS: 5000 INJECTION INTRAVENOUS; SUBCUTANEOUS at 08:35

## 2023-11-26 RX ADMIN — HYDROCHLOROTHIAZIDE 25 MG: 25 TABLET ORAL at 08:35

## 2023-11-26 RX ADMIN — IPRATROPIUM BROMIDE AND ALBUTEROL SULFATE 3 ML: .5; 3 SOLUTION RESPIRATORY (INHALATION) at 14:34

## 2023-11-26 RX ADMIN — Medication 10 ML: at 08:35

## 2023-11-26 RX ADMIN — METHYLPREDNISOLONE SODIUM SUCCINATE 40 MG: 40 INJECTION INTRAMUSCULAR; INTRAVENOUS at 06:12

## 2023-11-26 NOTE — PROCEDURES
Respiratory Therapist Walking Oximetry Progress Note      Patient Name:  Gloria Sunshine  YOB: 1960  Date of Procedure: 11/26/23              ROOM AIR BASELINE   SpO2% 92   Heart Rate 83     EXERCISE ON ROOM AIR SpO2% EXERCISE ON O2 LPM SpO2%   1 MINUTE 91 1 MINUTE     2 MINUTES 91 2 MINUTES     3 MINUTES 92 3 MINUTES     4 MINUTES 92 4 MINUTES     5 MINUTES 94 5 MINUTES     6 MINUTES 92 6 MINUTES                SpO2% Post Exercise  92   HR Post Exercise  102   Time to Recovery  0     Comments: PATIENT DID NOT DROP BELOW 91% DURING THE ENTIRE WALK TEST ON ROOM AIR.          Electronically signed by Ira Lentz, ABRAHAM, 11/26/23, 3:38 PM EST.  Respiratory Therapist Walking Oximetry Progress Note

## 2023-11-26 NOTE — PROCEDURES
Respiratory Therapist Walking Oximetry Progress Note      Patient Name:  Gloria Sunshine  YOB: 1960  Date of Procedure: 11/26/23              ROOM AIR BASELINE   SpO2% 92   Heart Rate 83     EXERCISE ON ROOM AIR SpO2% EXERCISE ON O2 LPM SpO2%   1 MINUTE 91 1 MINUTE     2 MINUTES 91 2 MINUTES     3 MINUTES 92 3 MINUTES     4 MINUTES 92 4 MINUTES     5 MINUTES 94 5 MINUTES     6 MINUTES 92 6 MINUTES                SpO2% Post Exercise  92   HR Post Exercise  102   Time to Recovery  2 mins     Comments: Pt did not drop below 91% and did not require oxygen.           Electronically signed by Randi Beavers, RRT, 11/26/23, 3:44 PM EST.

## 2023-11-26 NOTE — PROGRESS NOTES
[facility]   Hospitalist Progress Note  Date: 2023  Patient Name: Gloria Sunshine  : 1960  MRN: [id number]  Date of admission: 2023  Room[place]Bed: [facility] [place]/      Subjective   Subjective     Chief Complaint: Shortness of breath, cough    Summary:Gloria Sunshine is a 63 y.o. female with history of COPD (not on home oxygen), hypertension and hyperlipidemia who presented with complaints of shortness of breath and cough.  She recently came back from cruise after which she started experiencing worsening shortness of breath and productive cough with yellowish sputum production.  She went to urgent care where she was found to be hypoxic with SpO2 88% on room air after which she was advised to come to ED.  On presentation, she was hypertensive with blood pressure 200/98 and saturating 91% at 2 L/min of oxygen via NC.  Patient was admitted as a case of COPD exacerbation.    Interval Followup: No acute events overnight.  Patient stated that she feels better today.   Denied any difficulty breathing, fever, chills, rigors or chest pain today.  Lying in bed comfortably on 2 L/min of oxygen via NC. Wishes to go home today. Not on home oxygen.    Review of Systems    All systems reviewed and negative except for what is outlined above.      Objective   Objective     Vitals:   Temp:  [97.9 °F (36.6 °C)-98.4 °F (36.9 °C)] 98.2 °F (36.8 °C)  Heart Rate:  [62-84] 69  Resp:  [18-20] 18  BP: (123-145)/(61-79) 127/61  Flow (L/min):  [2] 2    Physical Exam   General: Awake, alert, NAD  Cardiovascular: RRR, no murmurs   Pulmonary: Bilateral decreased air entry; no wheezes; no conversational dyspnea; on 2 L/min of oxygen via NC.  Gastrointestinal: S/ND/NT, +BS  Musculoskeletal: No gross deformities  Skin: No jaundice, no rash on exposed skin appreciated  Neuro: Alert, awake, oriented x 3; speech clear; no tremor  : No Lyman catheter; no suprapubic tenderness    Result Review    Result Review:  I have  personally reviewed these results:  [x]  Laboratory      Lab 11/26/23 0421 11/25/23 0449 11/24/23 2052   WBC 19.80* 8.23 8.89   HEMOGLOBIN 12.4 12.8 13.5   HEMATOCRIT 38.4 38.8 40.4   PLATELETS 342 362 390   NEUTROS ABS 17.19* 7.10* 5.39   IMMATURE GRANS (ABS) 0.12* 0.02 0.02   LYMPHS ABS 1.69 0.99 2.43   MONOS ABS 0.74 0.08* 0.65   EOS ABS 0.01 0.02 0.35   MCV 88.5 88.6 88.4         Lab 11/26/23 0421 11/25/23 0449 11/24/23 2052   SODIUM 138 139 142   POTASSIUM 4.6 3.8 3.6   CHLORIDE 101 99 102   CO2 28.2 23.6 25.3   ANION GAP 8.8 16.4* 14.7   BUN 13 8 8   CREATININE 0.71 0.67 0.63   EGFR 96.3 99.0 100.4   GLUCOSE 138* 219* 133*   CALCIUM 9.8 9.7 10.1   MAGNESIUM 2.1  --   --    PHOSPHORUS 4.1  --   --    HEMOGLOBIN A1C  --  5.60  --          Lab 11/24/23 2052   TOTAL PROTEIN 7.7   ALBUMIN 5.1   GLOBULIN 2.6   ALT (SGPT) 12   AST (SGOT) 18   BILIRUBIN 0.3   ALK PHOS 72         Lab 11/24/23 2052   PROBNP 145.1   HSTROP T <6                 Brief Urine Lab Results       None          [x]  Microbiology   Microbiology Results (last 10 days)       Procedure Component Value - Date/Time    Respiratory Culture - Sputum, Cough [[id number]] Collected: 11/26/23 0914    Lab Status: Final result Specimen: Sputum from Cough Updated: 11/26/23 1006     Respiratory Culture Rejected     Gram Stain Rare (1+) Epithelial cells per low power field      Rare (1+) WBCs per low power field      Few (2+) Gram positive cocci in pairs and chains    Narrative:      Specimen rejected due to oropharyngeal contamination. Please reorder and recollect specimen if clinically necessary.    Respiratory Culture - Sputum, Cough [[id number]] Collected: 11/25/23 0840    Lab Status: Final result Specimen: Sputum from Cough Updated: 11/26/23 0920     Respiratory Culture Rejected     Gram Stain Rare (1+) WBCs seen      Few (2+) Squamous epithelial cells      Few (2+) Mixed alan    Narrative:      Specimen rejected due to oropharyngeal contamination.  Please reorder and recollect specimen if clinically necessary.    Mycoplasma Pneumoniae Antibody, IgM - Blood, Arm, Left [[id number]]  (Normal) Collected: 11/25/23 0449    Lab Status: Final result Specimen: Blood from Arm, Left Updated: 11/25/23 0554     Mycoplasma pneumo IgM Negative    Legionella Antigen, Urine - Urine, Urine, Clean Catch [[id number]]  (Normal) Collected: 11/25/23 0142    Lab Status: Final result Specimen: Urine, Clean Catch Updated: 11/25/23 0822     LEGIONELLA ANTIGEN, URINE Negative    S. Pneumo Ag Urine or CSF - Urine, Urine, Clean Catch [[id number]]  (Normal) Collected: 11/25/23 0142    Lab Status: Final result Specimen: Urine, Clean Catch Updated: 11/25/23 0821     Strep Pneumo Ag Negative    COVID-19, FLU A/B, RSV PCR 1 HR TAT - Swab, Nasopharynx [[id number]]  (Normal) Collected: 11/24/23 2053    Lab Status: Final result Specimen: Swab from Nasopharynx Updated: 11/24/23 2223     COVID19 Not Detected     Influenza A PCR Not Detected     Influenza B PCR Not Detected     RSV, PCR Not Detected    Narrative:      Fact sheet for providers: https://www.fda.gov/media/668834/download    Fact sheet for patients: https://www.fda.gov/media/462436/download    Test performed by PCR.          [x]  Radiology  XR Chest 1 View    Result Date: 11/24/2023    No acute cardiopulmonary abnormality is identified.       ROBINSON MARIE MD       Electronically Signed and Approved By: ROBINSON MARIE MD on 11/24/2023 at 21:13            []  EKG/Telemetry   []  Cardiology/Vascular   []  Pathology  []  Old records  []  Other:    Assessment & Plan   Assessment / Plan     Assessment:  Acute COPD exacerbation  Cough  Hypoxia  Hyperglycemia  History of COPD  History of hypertension  History of hyperlipidemia    Plan:  Patient currently being managed in hospitalist service.  Currently on 2 L/min of oxygen via NC.  Continue to wean as tolerated to maintain SpO2 88-92%.    COVID, influenza and RSV negative.  Legionella antigen,  mycoplasma pneumo and strep pneumo negative.  Respiratory culture was resected.  Gram stain showed mixed alan.  Chest x-ray showed no acute cardiopulmonary abnormality.  S/p methylprednisolone.  Transitioning to prednisone 40 mg oral daily.  Continue DuoNebs.  Holding off antibiotics as less likely infectious etiology.  On Tylenol as needed for pain.  Also on PRN low-dose Norco as patient is complaining of abdominal and lower chest pain due to cough spells.  Currently stable with as needed pain medication.  Elevated blood pressure resolved.  Restarted on home dose of hydrochlorothiazide 25 mg daily.  Blood pressure stable overnight.  Continue to monitor vitals.  On as needed IV hydralazine for SBP greater than 180.  Patient was hyperglycemic likely in the setting of steroids.  HbA1c 5.6.  Patient has not required any insulin in last 24 hours.  Will discontinue insulin sliding scale.  Will do blood glucose with daily labs.  Continue rest of the current management.  Nutrition consult placed for malnourishment.  Appreciate input.  Likely discharge in next 24 hours; home with [facility].    CODE STATUS:   Level Of Support Discussed With: Patient  Code Status (Patient has no pulse and is not breathing): CPR (Attempt to Resuscitate)  Medical Interventions (Patient has pulse or is breathing): Full Support      Electronically signed by Caleb Day MD, 11/26/23, 7:57 AM EST.

## 2023-11-26 NOTE — THERAPY EVALUATION
Acute Care - Physical Therapy Initial Evaluation   [place]     Patient Name: Gloria Sunshine  : 1960  MRN: [id number]  Today's Date: 2023      Visit Dx:     ICD-10-CM ICD-9-CM   1. COPD with acute exacerbation  J44.1 491.21   2. Difficulty walking  R26.2 719.7     Patient Active Problem List   Diagnosis    Acute respiratory failure with hypoxia    COPD exacerbation    Anxiety    Depression    Hypertension    Hyperlipidemia LDL goal <100     Past Medical History:   Diagnosis Date    Elevated cholesterol     Hyperlipidemia     Hypertension      Past Surgical History:   Procedure Laterality Date    CYST REMOVAL Left     breast    TUBAL ABDOMINAL LIGATION       PT Assessment (last 12 hours)       PT Evaluation and Treatment       Row Name 23 1300          Physical Therapy Time and Intention    Subjective Information no complaints  -CS     Document Type evaluation  -CS     Mode of Treatment individual therapy;physical therapy  -CS     Patient Effort adequate  -CS     Symptoms Noted During/After Treatment none  -CS       Row Name 23 1300          General Information    Patient Profile Reviewed yes  -CS     Patient Observations alert;cooperative;agree to therapy  -CS     Prior Level of Function independent:;all household mobility;gait;transfer;bed mobility;ADL's  -CS     Equipment Currently Used at Home none  -CS     Existing Precautions/Restrictions oxygen therapy device and L/min  2L  -CS     Barriers to Rehab none identified  -CS       Row Name 23 1300          Living Environment    Current Living Arrangements home  -CS     Home Accessibility stairs to enter home;stairs within home  -CS     People in Home spouse  -CS     Primary Care Provided by self  -CS       Row Name 23 1300          Home Main Entrance    Number of Stairs, Main Entrance none  -CS       Row Name 23 1300          Stairs Within Home, Primary    Number of Stairs, Within Home, Primary none  -CS        Row Name 11/26/23 1300          Cognition    Orientation Status (Cognition) oriented x 3  -CS       Row Name 11/26/23 1300          Range of Motion Comprehensive    General Range of Motion no range of motion deficits identified  -CS       Row Name 11/26/23 1300          Strength Comprehensive (MMT)    General Manual Muscle Testing (MMT) Assessment no strength deficits identified  -       Row Name 11/26/23 1300          Bed Mobility    Bed Mobility bed mobility (all) activities  -CS     All Activities, [place] (Bed Mobility) independent  -       Row Name 11/26/23 1300          Transfers    Comment, (Transfers) Patient has been and able to complete functional transfers independently without an assistive device.  -       Row Name 11/26/23 1300          Gait/Stairs (Locomotion)    Gait/Stairs Locomotion gait/ambulation assistive device  -CS     [place] Level (Gait) independent  -CS     Assistive Device (Gait) other (see comments)  no AD  -CS     Distance in Feet (Gait) 30  -CS     Pattern (Gait) step-through  -       Row Name 11/26/23 1300          Safety Issues, Functional Mobility    Impairments Affecting Function (Mobility) endurance/activity tolerance;shortness of breath  -       Row Name 11/26/23 1300          Balance    Balance Assessment standing dynamic balance  -CS     Dynamic Standing Balance independent  -CS     Position/Device Used, Standing Balance unsupported  -       Row Name 11/26/23 1300          Plan of Care Review    Plan of Care Reviewed With patient  -CS     Progress no change  -CS     Outcome Evaluation Patient presents with no physical or functional limitations that impede her ability to return home independently or with assist from spouse as needed.  She was encouraged to continue ambulating as tolerated in the room.  She may need 6-minute walk test prior to returning home due to new requirements of supplemental oxygen.  Once medically ready patient safe to return home and  may benefit from pulmonary rehab.  She will be discharged from physical therapy caseload.  -CS       Row Name 11/26/23 1300          Positioning and Restraints    Pre-Treatment Position in bed  -CS     Post Treatment Position bed  -CS     In Bed side lying left;call light within reach;encouraged to call for assist  -CS       Row Name 11/26/23 1300          Therapy Assessment/Plan (PT)    Criteria for Skilled Interventions Met (PT) no problems identified which require skilled intervention  -CS     Therapy Frequency (PT) evaluation only  -CS       Row Name 11/26/23 1300          PT Evaluation Complexity    History, PT Evaluation Complexity no personal factors and/or comorbidities  -CS     Examination of Body Systems (PT Eval Complexity) total of 4 or more elements  -CS     Clinical Presentation (PT Evaluation Complexity) stable  -CS     Clinical Decision Making (PT Evaluation Complexity) low complexity  -CS     Overall Complexity (PT Evaluation Complexity) low complexity  -CS       Row Name 11/26/23 1300          Therapy Plan Review/Discharge Plan (PT)    Therapy Plan Review (PT) evaluation/treatment results reviewed;patient  -CS               User Key  (r) = Recorded By, (t) = Taken By, (c) = Cosigned By      Initials Name Provider Type    CS Mikaela Joel, PT Physical Therapist                    Physical Therapy Education       Title: PT OT SLP Therapies (In Progress)       Topic: Physical Therapy (In Progress)       Point: Mobility training (In Progress)       Learning Progress Summary             Patient Acceptance, E, NR by CS at 11/26/2023 1351                         Point: Home exercise program (Not Started)       Learner Progress:  Not documented in this visit.              Point: Body mechanics (Not Started)       Learner Progress:  Not documented in this visit.              Point: Precautions (In Progress)       Learning Progress Summary             Patient Acceptance, E, NR by CS at 11/26/2023 1351                                          User Key       Initials Effective Dates Name Provider Type Discipline     04/25/21 -  Mikaela Joel PT Physical Therapist PT                  PT Recommendation and Plan  Anticipated Discharge Disposition (PT): home, home with assist  Therapy Frequency (PT): evaluation only  Plan of Care Reviewed With: patient  Progress: no change  Outcome Evaluation: Patient presents with no physical or functional limitations that impede her ability to return home independently or with assist from spouse as needed.  She was encouraged to continue ambulating as tolerated in the room.  She may need 6-minute walk test prior to returning home due to new requirements of supplemental oxygen.  Once medically ready patient safe to return home and may benefit from pulmonary rehab.  She will be discharged from physical therapy caseload.   Outcome Measures       Row Name 11/26/23 1300             How much help from another person do you currently need...    Turning from your back to your side while in flat bed without using bedrails? 4  -CS      Moving from lying on back to sitting on the side of a flat bed without bedrails? 4  -CS      Moving to and from a bed to a chair (including a wheelchair)? 4  -CS      Standing up from a chair using your arms (e.g., wheelchair, bedside chair)? 4  -CS      Climbing 3-5 steps with a railing? 4  -CS      To walk in hospital room? 4  -CS      AM-PAC 6 Clicks Score (PT) 24  -CS      Highest Level of Mobility Goal 8 --> Walked 250 feet or more  -CS         Functional Assessment    Outcome Measure Options AM-PAC 6 Clicks Basic Mobility (PT)  -CS                User Key  (r) = Recorded By, (t) = Taken By, (c) = Cosigned By      Initials Name Provider Type    Mikaela Davis PT Physical Therapist                     Time Calculation:    PT Charges       Row Name 11/26/23 2362             Time Calculation    PT Received On 11/26/23  -CS         Untimed Charges    PT  Eval/Re-eval Minutes 20  -CS         Total Minutes    Untimed Charges Total Minutes 20  -CS       Total Minutes 20  -CS                User Key  (r) = Recorded By, (t) = Taken By, (c) = Cosigned By      Initials Name Provider Type    Mikaela Davis, PT Physical Therapist                  Therapy Charges for Today       Code Description Service Date Service Provider Modifiers Qty    [id number] HC PT EVAL LOW COMPLEXITY 2 11/26/2023 Mikaela Joel PT GP 1            PT G-Codes  Outcome Measure Options: AM-PAC 6 Clicks Basic Mobility (PT)  AM-PAC 6 Clicks Score (PT): 24    Mikaela Joel, PT  11/26/2023

## 2023-11-26 NOTE — DISCHARGE SUMMARY
[facility]         HOSPITALIST  DISCHARGE SUMMARY    Patient Name: Gloria Sunshine  : 1960  MRN: [id number]    Date of Admission: 2023  Date of Discharge:  2023  Primary Care Physician: Anisa Jackson APRN    Consults       Date and Time Order Name Status Description    2023 12:08 AM Inpatient Hospitalist Consult              Active and Resolved Hospital Problems:  Active Hospital Problems    Diagnosis POA    **COPD exacerbation [J44.1] Yes      Resolved Hospital Problems   No resolved problems to display.       Hospital Course     Hospital Course:  Gloria Sunshine is a 63 y.o. female with history of COPD (not on home oxygen), hypertension and hyperlipidemia who presented with complaints of shortness of breath and cough.  She recently came back from cruise after which she started experiencing worsening shortness of breath and productive cough with yellowish sputum production.  She went to urgent care where she was found to be hypoxic with SpO2 88% on room air after which she was advised to come to ED.  On presentation, she was hypertensive with blood pressure 200/98 and saturating 91% at 2 L/min of oxygen via NC.  Patient was admitted as a case of COPD exacerbation.  Patient was started on IV Solu-Medrol.  Eventually her respiratory status improved.  Today, patient was monitored in room air in which her saturation was good.  6-minute walk test was done with normal saturation.  Thus, plan to discharge her home was made.    Patient is clinically and hemodynamically stable at the time of discharge.  Patient will complete 5-day course of oral prednisone.  Will follow-up with her PCP in 3-7 days, needs to trend her CBC and chemistries during the follow-up visit.  Advised to be compliant with medications and diet.  Might consider pulmonary rehab as outpatient which can be discussed with your primary care physician during follow-up.  Plan to discharge  was discussed with the patient which she agreed on.    DISCHARGE Follow Up Recommendations for labs and diagnostics:   As mentioned above.    Day of Discharge     Vital Signs:  Temp:  [97.9 °F (36.6 °C)-98.4 °F (36.9 °C)] 98.1 °F (36.7 °C)  Heart Rate:  [62-85] 83  Resp:  [18] 18  BP: (123-145)/(61-79) 140/71  Flow (L/min):  [2] 2  Physical Exam:   General: Awake, alert, NAD  Cardiovascular: RRR, no murmurs   Pulmonary: Bilateral decreased air entry; no wheezes; no conversational dyspnea; saturating well in room air.  Gastrointestinal: S/ND/NT, +BS  Neuro: Alert, awake, oriented x 3; speech clear; no tremor  : No Lyman catheter    Discharge Details        Discharge Medications        New Medications        Instructions Start Date   predniSONE 20 MG tablet  Commonly known as: DELTASONE   40 mg, Oral, Daily   Start Date: November 27, 2023            Continue These Medications        Instructions Start Date   albuterol sulfate  (90 Base) MCG/ACT inhaler  Commonly known as: PROVENTIL HFA;VENTOLIN HFA;PROAIR HFA   2 puffs, Inhalation, Every 4 Hours PRN      atorvastatin 10 MG tablet  Commonly known as: LIPITOR   Take 1 tablet by mouth nightly.      escitalopram 5 MG tablet  Commonly known as: LEXAPRO   Take 1 tablet by mouth daily.      hydroCHLOROthiazide 25 MG tablet  Commonly known as: HYDRODIURIL   25 mg, Oral, Daily      hydrOXYzine 25 MG tablet  Commonly known as: ATARAX   Take 1 tablet by mouth every 8 (eight) hours as needed for Anxiety.      mirtazapine 7.5 MG tablet  Commonly known as: REMERON   Take 1 tablet by mouth nightly.      multivitamin with minerals tablet tablet   1 tablet, Oral, Daily               No Known Allergies    Discharge Disposition:  Home or Self Care    Diet:  Hospital:  Diet Order   Procedures    Diet: Regular/House Diet; Texture: Regular Texture (IDDSI 7); Fluid Consistency: Thin (IDDSI 0)       Discharge Activity:   Activity Instructions    As tolerated           CODE  STATUS:  Code Status and Medical Interventions:   Ordered at: 11/25/23 0020     Level Of Support Discussed With:    Patient     Code Status (Patient has no pulse and is not breathing):    CPR (Attempt to Resuscitate)     Medical Interventions (Patient has pulse or is breathing):    Full Support       Future Appointments   Date Time Provider Department Center   2/9/2024  2:30 PM [facility] DOTTIE CHON 2  [facility] ETWMM [facility]   7/16/2024  9:45 AM Yaya Dover MD [facility] [place] [facility]           Pertinent  and/or Most Recent Results     PROCEDURES:   N/A    LAB RESULTS:      Lab 11/26/23 0421 11/25/23 0449 11/24/23 2052   WBC 19.80* 8.23 8.89   HEMOGLOBIN 12.4 12.8 13.5   HEMATOCRIT 38.4 38.8 40.4   PLATELETS 342 362 390   NEUTROS ABS 17.19* 7.10* 5.39   IMMATURE GRANS (ABS) 0.12* 0.02 0.02   LYMPHS ABS 1.69 0.99 2.43   MONOS ABS 0.74 0.08* 0.65   EOS ABS 0.01 0.02 0.35   MCV 88.5 88.6 88.4         Lab 11/26/23 0421 11/25/23 0449 11/24/23 2052   SODIUM 138 139 142   POTASSIUM 4.6 3.8 3.6   CHLORIDE 101 99 102   CO2 28.2 23.6 25.3   ANION GAP 8.8 16.4* 14.7   BUN 13 8 8   CREATININE 0.71 0.67 0.63   EGFR 96.3 99.0 100.4   GLUCOSE 138* 219* 133*   CALCIUM 9.8 9.7 10.1   MAGNESIUM 2.1  --   --    PHOSPHORUS 4.1  --   --    HEMOGLOBIN A1C  --  5.60  --          Lab 11/24/23 2052   TOTAL PROTEIN 7.7   ALBUMIN 5.1   GLOBULIN 2.6   ALT (SGPT) 12   AST (SGOT) 18   BILIRUBIN 0.3   ALK PHOS 72         Lab 11/24/23 2052   PROBNP 145.1   HSTROP T <6                 Brief Urine Lab Results       None          Microbiology Results (last 10 days)       Procedure Component Value - Date/Time    Respiratory Culture - Sputum, Cough [[id number]] Collected: 11/26/23 0914    Lab Status: Final result Specimen: Sputum from Cough Updated: 11/26/23 1006     Respiratory Culture Rejected     Gram Stain Rare (1+) Epithelial cells per low power field      Rare (1+) WBCs per low power field      Few (2+) Gram positive cocci in pairs and chains     Narrative:      Specimen rejected due to oropharyngeal contamination. Please reorder and recollect specimen if clinically necessary.    Respiratory Culture - Sputum, Cough [[id number]] Collected: 11/25/23 0840    Lab Status: Final result Specimen: Sputum from Cough Updated: 11/26/23 0920     Respiratory Culture Rejected     Gram Stain Rare (1+) WBCs seen      Few (2+) Squamous epithelial cells      Few (2+) Mixed alan    Narrative:      Specimen rejected due to oropharyngeal contamination. Please reorder and recollect specimen if clinically necessary.    Mycoplasma Pneumoniae Antibody, IgM - Blood, Arm, Left [[id number]]  (Normal) Collected: 11/25/23 0449    Lab Status: Final result Specimen: Blood from Arm, Left Updated: 11/25/23 0554     Mycoplasma pneumo IgM Negative    Legionella Antigen, Urine - Urine, Urine, Clean Catch [[id number]]  (Normal) Collected: 11/25/23 0142    Lab Status: Final result Specimen: Urine, Clean Catch Updated: 11/25/23 0822     LEGIONELLA ANTIGEN, URINE Negative    S. Pneumo Ag Urine or CSF - Urine, Urine, Clean Catch [[id number]]  (Normal) Collected: 11/25/23 0142    Lab Status: Final result Specimen: Urine, Clean Catch Updated: 11/25/23 0821     Strep Pneumo Ag Negative    COVID-19, FLU A/B, RSV PCR 1 HR TAT - Swab, Nasopharynx [[id number]]  (Normal) Collected: 11/24/23 2053    Lab Status: Final result Specimen: Swab from Nasopharynx Updated: 11/24/23 2223     COVID19 Not Detected     Influenza A PCR Not Detected     Influenza B PCR Not Detected     RSV, PCR Not Detected    Narrative:      Fact sheet for providers: https://www.fda.gov/media/726193/download    Fact sheet for patients: https://www.fda.gov/media/699074/download    Test performed by PCR.            XR Chest 1 View    Result Date: 11/24/2023    No acute cardiopulmonary abnormality is identified.       ROBINSON MAREI MD       Electronically Signed and Approved By: ROBINSON MARIE MD on 11/24/2023 at 21:13                        Results for orders placed during the hospital encounter of 07/21/23    Adult Transthoracic Echo Complete W/ Cont if Necessary Per Protocol    Interpretation Summary    Left ventricular ejection fraction appears to be 51 - 55%.    Estimated right ventricular systolic pressure from tricuspid regurgitation is mildly elevated (35-45 mmHg).    No singificant valve abnormalities    No pericardial effusion      Labs Pending at Discharge:        Time spent on Discharge including face to face service:  35 minutes    Electronically signed by Caleb Day MD, 11/26/23, 4:14 PM EST.

## 2023-11-26 NOTE — PLAN OF CARE
Goal Outcome Evaluation: Patient with no acute events thus far this shift.  Patient with complaints of pain, see emar.  Patient voiced some worries about possible dx of diabetes, education provided, positive reassurance and open dialog implemented. Continue with plan of care.

## 2023-11-26 NOTE — PLAN OF CARE
Goal Outcome Evaluation:  Plan of Care Reviewed With: patient        Progress: no change  Outcome Evaluation: Patient presents with no physical or functional limitations that impede her ability to return home independently or with assist from spouse as needed.  She was encouraged to continue ambulating as tolerated in the room.  She may need 6-minute walk test prior to returning home due to new requirements of supplemental oxygen.  Once medically ready patient safe to return home and may benefit from pulmonary rehab.  She will be discharged from physical therapy caseload.      Anticipated Discharge Disposition (PT): home, home with assist

## 2023-11-26 NOTE — PLAN OF CARE
Problem: Adult Inpatient Plan of Care  Goal: Plan of Care Review  Outcome: Met  Flowsheets (Taken 11/26/2023 1645)  Plan of Care Reviewed With: patient  Goal: Patient-Specific Goal (Individualized)  Outcome: Met  Goal: Absence of Hospital-Acquired Illness or Injury  Outcome: Met  Intervention: Identify and Manage Fall Risk  Recent Flowsheet Documentation  Taken 11/26/2023 1604 by Harriet Neville RN  Safety Promotion/Fall Prevention:   nonskid shoes/slippers when out of bed   safety round/check completed  Taken 11/26/2023 1500 by Harriet Neville RN  Safety Promotion/Fall Prevention:   nonskid shoes/slippers when out of bed   safety round/check completed  Taken 11/26/2023 1344 by Harriet Neville RN  Safety Promotion/Fall Prevention:   nonskid shoes/slippers when out of bed   safety round/check completed  Taken 11/26/2023 1300 by Harriet Neville RN  Safety Promotion/Fall Prevention:   nonskid shoes/slippers when out of bed   safety round/check completed  Taken 11/26/2023 1155 by Harriet Neville RN  Safety Promotion/Fall Prevention:   nonskid shoes/slippers when out of bed   safety round/check completed  Taken 11/26/2023 1100 by Harriet Neville RN  Safety Promotion/Fall Prevention: safety round/check completed  Taken 11/26/2023 0900 by Harriet Neville RN  Safety Promotion/Fall Prevention: safety round/check completed  Taken 11/26/2023 0834 by Harriet Neville RN  Safety Promotion/Fall Prevention:   nonskid shoes/slippers when out of bed   safety round/check completed  Taken 11/26/2023 0810 by Harriet Neville RN  Safety Promotion/Fall Prevention:   nonskid shoes/slippers when out of bed   safety round/check completed  Taken 11/26/2023 0750 by Harriet Neville RN  Safety Promotion/Fall Prevention:   nonskid shoes/slippers when out of bed   safety round/check completed  Intervention: Prevent Skin Injury  Recent Flowsheet Documentation  Taken 11/26/2023 0750 by Harriet Neville RN  Body Position: position changed independently  Intervention: Prevent and  Manage VTE (Venous Thromboembolism) Risk  Recent Flowsheet Documentation  Taken 11/26/2023 0750 by Harriet Neville RN  Activity Management:   activity encouraged   ambulated in room   ambulated to bathroom   back to bed   up ad sumaya  Range of Motion: active ROM (range of motion) encouraged  Intervention: Prevent Infection  Recent Flowsheet Documentation  Taken 11/26/2023 1500 by Harriet Neville RN  Infection Prevention:   hand hygiene promoted   single patient room provided  Taken 11/26/2023 1300 by Harriet Neville RN  Infection Prevention:   hand hygiene promoted   single patient room provided  Taken 11/26/2023 1100 by Harriet Neville RN  Infection Prevention:   hand hygiene promoted   single patient room provided  Taken 11/26/2023 0900 by Harriet Neville RN  Infection Prevention:   hand hygiene promoted   single patient room provided  Taken 11/26/2023 0750 by Harriet Neville RN  Infection Prevention:   hand hygiene promoted   single patient room provided  Goal: Optimal Comfort and Wellbeing  Outcome: Met  Intervention: Provide Person-Centered Care  Recent Flowsheet Documentation  Taken 11/26/2023 0750 by Harriet Neville RN  Trust Relationship/Rapport:   care explained   choices provided   emotional support provided   empathic listening provided   questions answered   questions encouraged   reassurance provided   thoughts/feelings acknowledged  Goal: Readiness for Transition of Care  Outcome: Met     Problem: Gas Exchange Impaired  Goal: Optimal Gas Exchange  Outcome: Met  Intervention: Optimize Oxygenation and Ventilation  Recent Flowsheet Documentation  Taken 11/26/2023 1604 by Harriet Neville RN  Head of Bed (HOB) Positioning: HOB elevated  Taken 11/26/2023 1500 by Harriet Neville RN  Head of Bed (HOB) Positioning: HOB elevated  Taken 11/26/2023 1344 by Harriet Neville RN  Head of Bed (HOB) Positioning: HOB elevated  Taken 11/26/2023 1300 by Harriet Neville RN  Head of Bed (HOB) Positioning: HOB elevated  Taken 11/26/2023 1155 by Harriet Neville  RN  Head of Bed (HOB) Positioning: HOB elevated  Taken 11/26/2023 0834 by Harriet Neville RN  Head of Bed (HOB) Positioning: HOB elevated  Taken 11/26/2023 0810 by Harriet Neville RN  Head of Bed (HOB) Positioning: HOB elevated  Taken 11/26/2023 0750 by Harriet Neville RN  Head of Bed (HOB) Positioning: HOB elevated  Airway/Ventilation Management: airway patency maintained     Problem: Pain Acute  Goal: Acceptable Pain Control and Functional Ability  Outcome: Met  Intervention: Optimize Psychosocial Wellbeing  Recent Flowsheet Documentation  Taken 11/26/2023 0750 by Harriet Neville RN  Supportive Measures: active listening utilized  Diversional Activities: television     Problem: COPD (Chronic Obstructive Pulmonary Disease) Comorbidity  Goal: Maintenance of COPD Symptom Control  Outcome: Met  Intervention: Maintain COPD-Symptom Control  Recent Flowsheet Documentation  Taken 11/26/2023 0750 by Harriet Neville RN  Supportive Measures: active listening utilized     Problem: Fall Injury Risk  Goal: Absence of Fall and Fall-Related Injury  Outcome: Met  Intervention: Promote Injury-Free Environment  Recent Flowsheet Documentation  Taken 11/26/2023 1604 by Harriet Neville RN  Safety Promotion/Fall Prevention:   nonskid shoes/slippers when out of bed   safety round/check completed  Taken 11/26/2023 1500 by Harriet Neville RN  Safety Promotion/Fall Prevention:   nonskid shoes/slippers when out of bed   safety round/check completed  Taken 11/26/2023 1344 by Harriet Neville RN  Safety Promotion/Fall Prevention:   nonskid shoes/slippers when out of bed   safety round/check completed  Taken 11/26/2023 1300 by Harriet Neville RN  Safety Promotion/Fall Prevention:   nonskid shoes/slippers when out of bed   safety round/check completed  Taken 11/26/2023 1155 by Harriet Neville RN  Safety Promotion/Fall Prevention:   nonskid shoes/slippers when out of bed   safety round/check completed  Taken 11/26/2023 1100 by Harriet Neville RN  Safety Promotion/Fall Prevention:  safety round/check completed  Taken 11/26/2023 0900 by Harriet Neville, AYUSH  Safety Promotion/Fall Prevention: safety round/check completed  Taken 11/26/2023 0834 by Harriet Neville RN  Safety Promotion/Fall Prevention:   nonskid shoes/slippers when out of bed   safety round/check completed  Taken 11/26/2023 0810 by Harriet Neville RN  Safety Promotion/Fall Prevention:   nonskid shoes/slippers when out of bed   safety round/check completed  Taken 11/26/2023 0750 by Harriet Neville RN  Safety Promotion/Fall Prevention:   nonskid shoes/slippers when out of bed   safety round/check completed   Goal Outcome Evaluation:  Plan of Care Reviewed With: patient         Pt is alert and orient x4.  VS WNL for pt.  Pt passed walk test.  O2 sats remained above 81% on RA.

## 2023-11-26 NOTE — PLAN OF CARE
Goal Outcome Evaluation:  Plan of Care Reviewed With: patient, spouse        Patient has had complaints of headaches today and abdominal pain from frequent coughing. Treated with ordered Tylenol. She is ad sumaya to the bathroom. Instructed on flutter valve and discussed blood sugar checks. VSS. Continue to monitor.

## 2023-11-27 NOTE — OUTREACH NOTE
Prep Survey      Flowsheet Row Responses   [facility] facility patient discharged from? [facility]   Is LACE score < 7 ? Yes   Eligibility Not Eligible   What are the reasons patient is not eligible? Other  [low risk for readmit]   Does the patient have one of the following disease processes/diagnoses(primary or secondary)? Other   Prep survey completed? Yes            Kaia SALES - Registered Nurse

## 2023-12-05 LAB
QT INTERVAL: 440 MS
QTC INTERVAL: 473 MS

## 2023-12-30 DIAGNOSIS — R06.02 SHORTNESS OF BREATH: ICD-10-CM

## 2023-12-30 DIAGNOSIS — J44.1 CHRONIC OBSTRUCTIVE PULMONARY DISEASE WITH ACUTE EXACERBATION: ICD-10-CM

## 2023-12-30 RX ORDER — BUDESONIDE AND FORMOTEROL FUMARATE DIHYDRATE 80; 4.5 UG/1; UG/1
2 AEROSOL RESPIRATORY (INHALATION)
Qty: 10.2 G | Refills: 0 | Status: CANCELLED | OUTPATIENT
Start: 2023-12-30

## 2023-12-30 RX ORDER — ALBUTEROL SULFATE 2.5 MG/3ML
2.5 SOLUTION RESPIRATORY (INHALATION) EVERY 4 HOURS PRN
Qty: 180 ML | Refills: 0 | Status: CANCELLED | OUTPATIENT
Start: 2023-12-30

## 2024-01-02 DIAGNOSIS — R06.02 SHORTNESS OF BREATH: ICD-10-CM

## 2024-01-02 DIAGNOSIS — J44.1 CHRONIC OBSTRUCTIVE PULMONARY DISEASE WITH ACUTE EXACERBATION: ICD-10-CM

## 2024-01-03 DIAGNOSIS — J44.1 CHRONIC OBSTRUCTIVE PULMONARY DISEASE WITH ACUTE EXACERBATION: ICD-10-CM

## 2024-01-03 DIAGNOSIS — R06.02 SHORTNESS OF BREATH: ICD-10-CM

## 2024-01-03 RX ORDER — ALBUTEROL SULFATE 2.5 MG/3ML
2.5 SOLUTION RESPIRATORY (INHALATION) EVERY 4 HOURS PRN
Qty: 180 ML | Refills: 0 | Status: CANCELLED | OUTPATIENT
Start: 2024-01-03

## 2024-01-03 RX ORDER — BUDESONIDE AND FORMOTEROL FUMARATE DIHYDRATE 80; 4.5 UG/1; UG/1
2 AEROSOL RESPIRATORY (INHALATION)
Qty: 10.2 G | Refills: 0 | OUTPATIENT
Start: 2024-01-03

## 2024-01-03 RX ORDER — ALBUTEROL SULFATE 2.5 MG/3ML
2.5 SOLUTION RESPIRATORY (INHALATION) EVERY 4 HOURS PRN
Qty: 180 ML | Refills: 0 | OUTPATIENT
Start: 2024-01-03

## 2024-01-03 RX ORDER — BUDESONIDE AND FORMOTEROL FUMARATE DIHYDRATE 80; 4.5 UG/1; UG/1
2 AEROSOL RESPIRATORY (INHALATION)
Qty: 10.2 G | Refills: 0 | Status: CANCELLED | OUTPATIENT
Start: 2024-01-03

## 2024-01-28 ENCOUNTER — HOSPITAL ENCOUNTER (OUTPATIENT)
Facility: HOSPITAL | Age: 64
Setting detail: OBSERVATION
Discharge: HOME | End: 2024-01-30
Attending: EMERGENCY MEDICINE | Admitting: INTERNAL MEDICINE
Payer: COMMERCIAL

## 2024-01-28 ENCOUNTER — APPOINTMENT (OUTPATIENT)
Dept: CT IMAGING | Facility: HOSPITAL | Age: 64
End: 2024-01-28
Payer: COMMERCIAL

## 2024-01-28 ENCOUNTER — APPOINTMENT (OUTPATIENT)
Dept: GENERAL RADIOLOGY | Facility: HOSPITAL | Age: 64
End: 2024-01-28
Payer: COMMERCIAL

## 2024-01-28 DIAGNOSIS — R26.2 DIFFICULTY WALKING: ICD-10-CM

## 2024-01-28 DIAGNOSIS — Z78.9 DECREASED ACTIVITIES OF DAILY LIVING (ADL): ICD-10-CM

## 2024-01-28 DIAGNOSIS — J44.1 COPD EXACERBATION: ICD-10-CM

## 2024-01-28 DIAGNOSIS — R06.02 SHORTNESS OF BREATH: ICD-10-CM

## 2024-01-28 DIAGNOSIS — J44.1 CHRONIC OBSTRUCTIVE PULMONARY DISEASE WITH ACUTE EXACERBATION: ICD-10-CM

## 2024-01-28 DIAGNOSIS — R06.09 DYSPNEA ON EXERTION: Primary | ICD-10-CM

## 2024-01-28 DIAGNOSIS — R09.02 HYPOXIA: ICD-10-CM

## 2024-01-28 LAB
ALBUMIN SERPL-MCNC: 5.1 G/DL (ref 3.5–5.2)
ALBUMIN/GLOB SERPL: 1.7 G/DL
ALP SERPL-CCNC: 75 U/L (ref 39–117)
ALT SERPL W P-5'-P-CCNC: 16 U/L (ref 1–33)
ANION GAP SERPL CALCULATED.3IONS-SCNC: 14.6 MMOL/L (ref 5–15)
AST SERPL-CCNC: 24 U/L (ref 1–32)
BASOPHILS # BLD AUTO: 0.1 10*3/MM3 (ref 0–0.2)
BASOPHILS NFR BLD AUTO: 1 % (ref 0–1.5)
BILIRUB SERPL-MCNC: 0.3 MG/DL (ref 0–1.2)
BUN SERPL-MCNC: 9 MG/DL (ref 8–23)
BUN/CREAT SERPL: 12.3 (ref 7–25)
CALCIUM SPEC-SCNC: 10.6 MG/DL (ref 8.6–10.5)
CHLORIDE SERPL-SCNC: 97 MMOL/L (ref 98–107)
CO2 SERPL-SCNC: 28.4 MMOL/L (ref 22–29)
CREAT SERPL-MCNC: 0.73 MG/DL (ref 0.57–1)
DEPRECATED RDW RBC AUTO: 48.9 FL (ref 37–54)
EGFRCR SERPLBLD CKD-EPI 2021: 93.1 ML/MIN/1.73
EOSINOPHIL # BLD AUTO: 0.89 10*3/MM3 (ref 0–0.4)
EOSINOPHIL NFR BLD AUTO: 8.9 % (ref 0.3–6.2)
ERYTHROCYTE [DISTWIDTH] IN BLOOD BY AUTOMATED COUNT: 15.2 % (ref 12.3–15.4)
FLUAV SUBTYP SPEC NAA+PROBE: NOT DETECTED
FLUBV RNA ISLT QL NAA+PROBE: NOT DETECTED
GLOBULIN UR ELPH-MCNC: 3 GM/DL
GLUCOSE SERPL-MCNC: 102 MG/DL (ref 65–99)
HCT VFR BLD AUTO: 44 % (ref 34–46.6)
HGB BLD-MCNC: 14.8 G/DL (ref 12–15.9)
HOLD SPECIMEN: NORMAL
HOLD SPECIMEN: NORMAL
IMM GRANULOCYTES # BLD AUTO: 0.03 10*3/MM3 (ref 0–0.05)
IMM GRANULOCYTES NFR BLD AUTO: 0.3 % (ref 0–0.5)
LYMPHOCYTES # BLD AUTO: 3.72 10*3/MM3 (ref 0.7–3.1)
LYMPHOCYTES NFR BLD AUTO: 37.2 % (ref 19.6–45.3)
MAGNESIUM SERPL-MCNC: 2.1 MG/DL (ref 1.6–2.4)
MCH RBC QN AUTO: 29.5 PG (ref 26.6–33)
MCHC RBC AUTO-ENTMCNC: 33.6 G/DL (ref 31.5–35.7)
MCV RBC AUTO: 87.8 FL (ref 79–97)
MONOCYTES # BLD AUTO: 0.95 10*3/MM3 (ref 0.1–0.9)
MONOCYTES NFR BLD AUTO: 9.5 % (ref 5–12)
NEUTROPHILS NFR BLD AUTO: 4.32 10*3/MM3 (ref 1.7–7)
NEUTROPHILS NFR BLD AUTO: 43.1 % (ref 42.7–76)
NRBC BLD AUTO-RTO: 0 /100 WBC (ref 0–0.2)
NT-PROBNP SERPL-MCNC: 66.5 PG/ML (ref 0–900)
PLAT MORPH BLD: NORMAL
PLATELET # BLD AUTO: 421 10*3/MM3 (ref 140–450)
PMV BLD AUTO: 9.1 FL (ref 6–12)
POTASSIUM SERPL-SCNC: 2.9 MMOL/L (ref 3.5–5.2)
PROCALCITONIN SERPL-MCNC: <0.02 NG/ML (ref 0–0.25)
PROT SERPL-MCNC: 8.1 G/DL (ref 6–8.5)
RBC # BLD AUTO: 5.01 10*6/MM3 (ref 3.77–5.28)
RBC MORPH BLD: NORMAL
RSV RNA NPH QL NAA+NON-PROBE: NOT DETECTED
SARS-COV-2 RNA RESP QL NAA+PROBE: NOT DETECTED
SODIUM SERPL-SCNC: 140 MMOL/L (ref 136–145)
TROPONIN T SERPL HS-MCNC: 6 NG/L
WBC MORPH BLD: NORMAL
WBC NRBC COR # BLD AUTO: 10.01 10*3/MM3 (ref 3.4–10.8)
WHOLE BLOOD HOLD COAG: NORMAL
WHOLE BLOOD HOLD SPECIMEN: NORMAL

## 2024-01-28 PROCEDURE — 83735 ASSAY OF MAGNESIUM: CPT

## 2024-01-28 PROCEDURE — 84145 PROCALCITONIN (PCT): CPT | Performed by: INTERNAL MEDICINE

## 2024-01-28 PROCEDURE — 99223 1ST HOSP IP/OBS HIGH 75: CPT | Performed by: INTERNAL MEDICINE

## 2024-01-28 PROCEDURE — 25510000001 IOPAMIDOL PER 1 ML: Performed by: EMERGENCY MEDICINE

## 2024-01-28 PROCEDURE — 83880 ASSAY OF NATRIURETIC PEPTIDE: CPT | Performed by: EMERGENCY MEDICINE

## 2024-01-28 PROCEDURE — 93005 ELECTROCARDIOGRAM TRACING: CPT | Performed by: EMERGENCY MEDICINE

## 2024-01-28 PROCEDURE — 96374 THER/PROPH/DIAG INJ IV PUSH: CPT

## 2024-01-28 PROCEDURE — 85025 COMPLETE CBC W/AUTO DIFF WBC: CPT | Performed by: EMERGENCY MEDICINE

## 2024-01-28 PROCEDURE — G0378 HOSPITAL OBSERVATION PER HR: HCPCS

## 2024-01-28 PROCEDURE — 94799 UNLISTED PULMONARY SVC/PX: CPT

## 2024-01-28 PROCEDURE — 25810000003 SODIUM CHLORIDE 0.9 % SOLUTION 500 ML FLEX CONT: Performed by: INTERNAL MEDICINE

## 2024-01-28 PROCEDURE — 99285 EMERGENCY DEPT VISIT HI MDM: CPT

## 2024-01-28 PROCEDURE — 71260 CT THORAX DX C+: CPT

## 2024-01-28 PROCEDURE — 94640 AIRWAY INHALATION TREATMENT: CPT

## 2024-01-28 PROCEDURE — 71045 X-RAY EXAM CHEST 1 VIEW: CPT

## 2024-01-28 PROCEDURE — 93010 ELECTROCARDIOGRAM REPORT: CPT | Performed by: INTERNAL MEDICINE

## 2024-01-28 PROCEDURE — 87637 SARSCOV2&INF A&B&RSV AMP PRB: CPT | Performed by: EMERGENCY MEDICINE

## 2024-01-28 PROCEDURE — 80053 COMPREHEN METABOLIC PANEL: CPT | Performed by: EMERGENCY MEDICINE

## 2024-01-28 PROCEDURE — 84484 ASSAY OF TROPONIN QUANT: CPT | Performed by: EMERGENCY MEDICINE

## 2024-01-28 PROCEDURE — 25010000002 METHYLPREDNISOLONE PER 125 MG

## 2024-01-28 PROCEDURE — 85007 BL SMEAR W/DIFF WBC COUNT: CPT | Performed by: EMERGENCY MEDICINE

## 2024-01-28 RX ORDER — ARFORMOTEROL TARTRATE 15 UG/2ML
15 SOLUTION RESPIRATORY (INHALATION)
Status: DISCONTINUED | OUTPATIENT
Start: 2024-01-28 | End: 2024-01-30 | Stop reason: HOSPADM

## 2024-01-28 RX ORDER — SODIUM CHLORIDE 9 MG/ML
40 INJECTION, SOLUTION INTRAVENOUS AS NEEDED
Status: DISCONTINUED | OUTPATIENT
Start: 2024-01-28 | End: 2024-01-30 | Stop reason: HOSPADM

## 2024-01-28 RX ORDER — BUDESONIDE 0.5 MG/2ML
0.5 INHALANT ORAL
Status: DISCONTINUED | OUTPATIENT
Start: 2024-01-28 | End: 2024-01-30 | Stop reason: HOSPADM

## 2024-01-28 RX ORDER — SODIUM CHLORIDE 0.9 % (FLUSH) 0.9 %
10 SYRINGE (ML) INJECTION EVERY 12 HOURS SCHEDULED
Status: DISCONTINUED | OUTPATIENT
Start: 2024-01-28 | End: 2024-01-30 | Stop reason: HOSPADM

## 2024-01-28 RX ORDER — ACETAMINOPHEN 325 MG/1
650 TABLET ORAL EVERY 4 HOURS PRN
Status: DISCONTINUED | OUTPATIENT
Start: 2024-01-28 | End: 2024-01-30 | Stop reason: HOSPADM

## 2024-01-28 RX ORDER — SODIUM CHLORIDE 0.9 % (FLUSH) 0.9 %
10 SYRINGE (ML) INJECTION AS NEEDED
Status: DISCONTINUED | OUTPATIENT
Start: 2024-01-28 | End: 2024-01-30 | Stop reason: HOSPADM

## 2024-01-28 RX ORDER — POTASSIUM CHLORIDE 750 MG/1
40 CAPSULE, EXTENDED RELEASE ORAL ONCE
Status: COMPLETED | OUTPATIENT
Start: 2024-01-28 | End: 2024-01-28

## 2024-01-28 RX ORDER — ENOXAPARIN SODIUM 100 MG/ML
40 INJECTION SUBCUTANEOUS NIGHTLY
Status: DISCONTINUED | OUTPATIENT
Start: 2024-01-28 | End: 2024-01-30 | Stop reason: HOSPADM

## 2024-01-28 RX ORDER — IPRATROPIUM BROMIDE AND ALBUTEROL SULFATE 2.5; .5 MG/3ML; MG/3ML
3 SOLUTION RESPIRATORY (INHALATION)
Status: DISCONTINUED | OUTPATIENT
Start: 2024-01-28 | End: 2024-01-30 | Stop reason: HOSPADM

## 2024-01-28 RX ORDER — METHYLPREDNISOLONE SODIUM SUCCINATE 125 MG/2ML
125 INJECTION, POWDER, LYOPHILIZED, FOR SOLUTION INTRAMUSCULAR; INTRAVENOUS ONCE
Status: COMPLETED | OUTPATIENT
Start: 2024-01-28 | End: 2024-01-28

## 2024-01-28 RX ORDER — PREDNISONE 20 MG/1
40 TABLET ORAL
Status: DISCONTINUED | OUTPATIENT
Start: 2024-01-29 | End: 2024-01-30 | Stop reason: HOSPADM

## 2024-01-28 RX ADMIN — Medication 10 ML: at 23:42

## 2024-01-28 RX ADMIN — ARFORMOTEROL TARTRATE 15 MCG: 15 SOLUTION RESPIRATORY (INHALATION) at 23:46

## 2024-01-28 RX ADMIN — IOPAMIDOL 100 ML: 755 INJECTION, SOLUTION INTRAVENOUS at 17:51

## 2024-01-28 RX ADMIN — POTASSIUM CHLORIDE 40 MEQ: 10 CAPSULE, COATED, EXTENDED RELEASE ORAL at 17:30

## 2024-01-28 RX ADMIN — IPRATROPIUM BROMIDE AND ALBUTEROL SULFATE 3 ML: .5; 3 SOLUTION RESPIRATORY (INHALATION) at 23:46

## 2024-01-28 RX ADMIN — SODIUM CHLORIDE 40 ML: 9 INJECTION, SOLUTION INTRAVENOUS at 23:41

## 2024-01-28 RX ADMIN — ACETAMINOPHEN 650 MG: 325 TABLET ORAL at 23:59

## 2024-01-28 RX ADMIN — BUDESONIDE 0.5 MG: 0.5 SUSPENSION RESPIRATORY (INHALATION) at 23:46

## 2024-01-28 RX ADMIN — METHYLPREDNISOLONE SODIUM SUCCINATE 125 MG: 125 INJECTION INTRAMUSCULAR; INTRAVENOUS at 16:28

## 2024-01-28 RX ADMIN — DOXYCYCLINE 100 MG: 100 INJECTION, POWDER, LYOPHILIZED, FOR SOLUTION INTRAVENOUS at 23:41

## 2024-01-28 NOTE — ED PROVIDER NOTES
Time: 6:44 PM EST  Date of encounter:  1/28/2024  Independent Historian/Clinical History and Information was obtained by:   Patient    History is limited by: N/A    Chief Complaint: Shortness of air      History of Present Illness:  Patient is a 63 y.o. year old female who presents to the emergency department for evaluation of shortness of air with exertion that began 2 days ago.  Patient states she has had a cough with productive clear sputum.  Patient denies chest pain.  Patient states she is not on oxygen at home.  Patient denies recent travel.  Patient denies a cardiac history.    HPI    Patient Care Team  Primary Care Provider: Anisa Jackson APRN    Past Medical History:     Allergies   Allergen Reactions    Sulfamethoxazole Nausea And Vomiting    Trimethoprim Nausea And Vomiting    Epinephrine Palpitations    Penicillins Rash     Past Medical History:   Diagnosis Date    Elevated cholesterol     Hyperlipidemia     Hypertension      Past Surgical History:   Procedure Laterality Date    CYST REMOVAL Left     breast    TUBAL ABDOMINAL LIGATION       History reviewed. No pertinent family history.    Home Medications:  Prior to Admission medications    Medication Sig Start Date End Date Taking? Authorizing Provider   albuterol (PROVENTIL) (2.5 MG/3ML) 0.083% nebulizer solution Take 2.5 mg by nebulization Every 4 (Four) Hours As Needed for Wheezing or Shortness of Air. 12/4/23   Lucila Saldivar APRN   albuterol sulfate  (90 Base) MCG/ACT inhaler Inhale 2 puffs into the lungs every 4 (four) hours as needed for Shortness of Air. 11/6/23      albuterol sulfate  (90 Base) MCG/ACT inhaler Inhale 2 puffs Every 4 (Four) Hours As Needed for Wheezing or Shortness of Air. 11/26/23   Caleb Day MD   atorvastatin (LIPITOR) 10 MG tablet Take 1 tablet by mouth nightly. 11/6/23      atorvastatin (LIPITOR) 20 MG tablet Take 1 tablet by mouth nightly. 1/18/24      azithromycin (Zithromax Z-Elier)  250 MG tablet Take 2 tablets by mouth on day 1, then 1 tablet daily on days 2-5 12/4/23   Lucila Saldivar APRN   benzonatate (TESSALON) 200 MG capsule Take 1 capsule by mouth 3 (Three) Times a Day As Needed for Cough. 12/4/23   Lucila Saldivar APRN   budesonide-formoterol (Symbicort) 80-4.5 MCG/ACT inhaler Inhale 2 puffs 2 (Two) Times a Day. 12/4/23   Janna, Lucila Aguiar, APRN   budesonide-formoterol (SYMBICORT) 80-4.5 MCG/ACT inhaler Inhale 2 puffs 2 (Two) Times a Day. 1/4/24      escitalopram (LEXAPRO) 5 MG tablet Take 1 tablet by mouth daily. 11/6/23      hydroCHLOROthiazide (HYDRODIURIL) 25 MG tablet Take 1 tablet by mouth Daily.    Provider, MD Steven   hydroCHLOROthiazide (HYDRODIURIL) 25 MG tablet Take 1 tablet by mouth daily. 11/6/23      hydrOXYzine (ATARAX) 25 MG tablet Take 1 tablet by mouth every 8 (eight) hours as needed for Anxiety. 11/6/23      mirtazapine (REMERON) 7.5 MG tablet Take 1 tablet by mouth nightly. 11/6/23      multivitamin with minerals tablet tablet Take 1 tablet by mouth Daily.    Provider, MD Steven        Social History:   Social History     Tobacco Use    Smoking status: Every Day     Packs/day: 0.25     Years: 46.00     Additional pack years: 0.00     Total pack years: 11.50     Types: Cigarettes    Smokeless tobacco: Never   Vaping Use    Vaping Use: Never used   Substance Use Topics    Alcohol use: Not Currently    Drug use: Not Currently         Review of Systems:  Review of Systems   Constitutional:  Negative for chills and fever.   HENT:  Negative for congestion, rhinorrhea and sore throat.    Eyes:  Negative for pain and visual disturbance.   Respiratory:  Positive for cough and shortness of breath. Negative for apnea and chest tightness.    Cardiovascular:  Negative for chest pain and palpitations.   Gastrointestinal:  Negative for abdominal pain, diarrhea, nausea and vomiting.   Genitourinary:  Negative for difficulty urinating and  "dysuria.   Musculoskeletal:  Negative for joint swelling and myalgias.   Skin:  Negative for color change.   Neurological:  Negative for seizures and headaches.   Psychiatric/Behavioral: Negative.     All other systems reviewed and are negative.       Physical Exam:  /94   Pulse 73   Temp 98 °F (36.7 °C) (Oral)   Resp 26   Ht 180.3 cm (71\")   Wt 55.9 kg (123 lb 3.8 oz)   SpO2 (!) 89%   BMI 17.19 kg/m²     Physical Exam  Vitals and nursing note reviewed.   Constitutional:       General: She is not in acute distress.     Appearance: Normal appearance. She is not toxic-appearing.   HENT:      Head: Normocephalic and atraumatic.      Jaw: There is normal jaw occlusion.   Eyes:      General: Lids are normal.      Extraocular Movements: Extraocular movements intact.      Conjunctiva/sclera: Conjunctivae normal.      Pupils: Pupils are equal, round, and reactive to light.   Cardiovascular:      Rate and Rhythm: Normal rate and regular rhythm.      Pulses: Normal pulses.      Heart sounds: Normal heart sounds.   Pulmonary:      Effort: Pulmonary effort is normal. No respiratory distress.      Breath sounds: Decreased breath sounds and wheezing present. No rhonchi.   Abdominal:      General: Abdomen is flat.      Palpations: Abdomen is soft.      Tenderness: There is no abdominal tenderness. There is no guarding or rebound.   Musculoskeletal:         General: Normal range of motion.      Cervical back: Normal range of motion and neck supple.      Right lower leg: No edema.      Left lower leg: No edema.   Skin:     General: Skin is warm and dry.   Neurological:      Mental Status: She is alert and oriented to person, place, and time. Mental status is at baseline.   Psychiatric:         Mood and Affect: Mood normal.                  Procedures:  Procedures      Medical Decision Making:      Comorbidities that affect care:    Hypertension, hyperlipidemia    External Notes reviewed:          The following orders " were placed and all results were independently analyzed by me:  Orders Placed This Encounter   Procedures    COVID PRE-OP / PRE-PROCEDURE SCREENING ORDER (NO ISOLATION) - Swab, Nasopharynx    COVID-19, FLU A/B, RSV PCR 1 HR TAT - Swab, Nasopharynx    XR Chest 1 View    CT Chest With Contrast Diagnostic    [place] Draw    Comprehensive Metabolic Panel    BNP    Single High Sensitivity Troponin T    CBC Auto Differential    Scan Slide    Magnesium    NPO Diet NPO Type: Strict NPO    Undress & Gown    Continuous Pulse Oximetry    Vital Signs    Inpatient Hospitalist Consult    Oxygen Therapy- Nasal Cannula; Titrate 1-6 LPM Per SpO2; 90 - 95%    ECG 12 Lead ED Triage Standing Order; SOA    Insert Peripheral IV    CBC & Differential    Green Top (Gel)    Lavender Top    Gold Top - SST    Light Blue Top       Medications Given in the Emergency Department:  Medications   sodium chloride 0.9 % flush 10 mL (has no administration in time range)   methylPREDNISolone sodium succinate (SOLU-Medrol) injection 125 mg (125 mg Intravenous Given 1/28/24 1628)   potassium chloride (MICRO-K/KLOR-CON) CR capsule (40 mEq Oral Given 1/28/24 1730)   iopamidol (ISOVUE-370) 76 % injection 100 mL (100 mL Intravenous Given 1/28/24 1751)        ED Course:         Labs:    Lab Results (last 24 hours)       Procedure Component Value Units Date/Time    COVID PRE-OP / PRE-PROCEDURE SCREENING ORDER (NO ISOLATION) - Swab, Nasopharynx [[id number]]  (Normal) Collected: 01/28/24 1559    Specimen: Swab from Nasopharynx Updated: 01/28/24 1646    Narrative:      The following orders were created for panel order COVID PRE-OP / PRE-PROCEDURE SCREENING ORDER (NO ISOLATION) - Swab, Nasopharynx.  Procedure                               Abnormality         Status                     ---------                               -----------         ------                     COVID-19, FLU A/B, RSV P...[[id number]]  Normal              Final result                 Please  view results for these tests on the individual orders.    COVID-19, FLU A/B, RSV PCR 1 HR TAT - Swab, Nasopharynx [[id number]]  (Normal) Collected: 01/28/24 1559    Specimen: Swab from Nasopharynx Updated: 01/28/24 1646     COVID19 Not Detected     Influenza A PCR Not Detected     Influenza B PCR Not Detected     RSV, PCR Not Detected    Narrative:      Fact sheet for providers: https://www.fda.gov/media/069564/download    Fact sheet for patients: https://www.fda.gov/media/741291/download    Test performed by PCR.    CBC & Differential [[id number]]  (Abnormal) Collected: 01/28/24 1600    Specimen: Blood Updated: 01/28/24 1643    Narrative:      The following orders were created for panel order CBC & Differential.  Procedure                               Abnormality         Status                     ---------                               -----------         ------                     CBC Auto Differential[[id number]]        Abnormal            Final result               Scan Slide[[id number]]                   Normal              Final result                 Please view results for these tests on the individual orders.    Comprehensive Metabolic Panel [[id number]]  (Abnormal) Collected: 01/28/24 1600    Specimen: Blood Updated: 01/28/24 1631     Glucose 102 mg/dL      BUN 9 mg/dL      Creatinine 0.73 mg/dL      Sodium 140 mmol/L      Potassium 2.9 mmol/L      Chloride 97 mmol/L      CO2 28.4 mmol/L      Calcium 10.6 mg/dL      Total Protein 8.1 g/dL      Albumin 5.1 g/dL      ALT (SGPT) 16 U/L      AST (SGOT) 24 U/L      Alkaline Phosphatase 75 U/L      Total Bilirubin 0.3 mg/dL      Globulin 3.0 gm/dL      A/G Ratio 1.7 g/dL      BUN/Creatinine Ratio 12.3     Anion Gap 14.6 mmol/L      eGFR 93.1 mL/min/1.73     Narrative:      GFR Normal >60  Chronic Kidney Disease <60  Kidney Failure <15      BNP [[id number]]  (Normal) Collected: 01/28/24 1600    Specimen: Blood Updated: 01/28/24 1630     proBNP 66.5 pg/mL      Narrative:      This assay is used as an aid in the diagnosis of individuals suspected of having heart failure. It can be used as an aid in the diagnosis of acute decompensated heart failure (ADHF) in patients presenting with signs and symptoms of ADHF to the emergency department (ED). In addition, NT-proBNP of <300 pg/mL indicates ADHF is not likely.    Age Range Result Interpretation  NT-proBNP Concentration (pg/mL:      <50             Positive            >450                   Gray                 300-450                    Negative             <300    50-75           Positive            >900                  Gray                300-900                  Negative            <300      >75             Positive            >1800                  Gray                300-1800                  Negative            <300    Single High Sensitivity Troponin T [[id number]]  (Normal) Collected: 01/28/24 1600    Specimen: Blood Updated: 01/28/24 1631     HS Troponin T 6 ng/L     Narrative:      High Sensitive Troponin T Reference Range:  <14.0 ng/L- Negative Female for AMI  <22.0 ng/L- Negative Male for AMI  >=14 - Abnormal Female indicating possible myocardial injury.  >=22 - Abnormal Male indicating possible myocardial injury.   Clinicians would have to utilize clinical acumen, EKG, Troponin, and serial changes to determine if it is an Acute Myocardial Infarction or myocardial injury due to an underlying chronic condition.         CBC Auto Differential [[id number]]  (Abnormal) Collected: 01/28/24 1600    Specimen: Blood Updated: 01/28/24 1614     WBC 10.01 10*3/mm3      RBC 5.01 10*6/mm3      Hemoglobin 14.8 g/dL      Hematocrit 44.0 %      MCV 87.8 fL      MCH 29.5 pg      MCHC 33.6 g/dL      RDW 15.2 %      RDW-SD 48.9 fl      MPV 9.1 fL      Platelets 421 10*3/mm3      Neutrophil % 43.1 %      Lymphocyte % 37.2 %      Monocyte % 9.5 %      Eosinophil % 8.9 %      Basophil % 1.0 %      Immature Grans % 0.3 %       Neutrophils, Absolute 4.32 10*3/mm3      Lymphocytes, Absolute 3.72 10*3/mm3      Monocytes, Absolute 0.95 10*3/mm3      Eosinophils, Absolute 0.89 10*3/mm3      Basophils, Absolute 0.10 10*3/mm3      Immature Grans, Absolute 0.03 10*3/mm3      nRBC 0.0 /100 WBC     Scan Slide [[id number]]  (Normal) Collected: 01/28/24 1600    Specimen: Blood Updated: 01/28/24 1643     RBC Morphology Normal     WBC Morphology Normal     Platelet Morphology Normal    Magnesium [[id number]]  (Normal) Collected: 01/28/24 1600    Specimen: Blood Updated: 01/28/24 1704     Magnesium 2.1 mg/dL              Imaging:    CT Chest With Contrast Diagnostic    Result Date: 1/28/2024  PROCEDURE: CT CHEST W CONTRAST DIAGNOSTIC  COMPARISON:  [facility], CR, XR CHEST 1 VW, 1/28/2024, 16:17.  [facility], CT, CT CHEST W CONTRAST DIAGNOSTIC, 7/21/2023, 22:37.  INDICATIONS: SHORTNESS OF BREATH. COUGH.  TECHNIQUE: CT images were obtained with non-ionic intravenous contrast material.   PROTOCOL:   Pulmonary embolism imaging protocol performed    RADIATION:   DLP: 184.3mGy*cm   Automated exposure control was utilized to minimize radiation dose. CONTRAST: 80cc Isovue 370 I.V. LABS:   eGFR: >60ml/min/1.73m2  FINDINGS:  The pulmonary arteries are well opacified.  No filling defects are evident.  There are no findings of PE.  Lung window images reveal marked emphysema.  Scarring at the left lung base appears stable.  No lung nodule is evident.  Mediastinal windows reveal no mediastinal, hilar, or axillary adenopathy.  Coronary artery calcifications are evident.  A small hiatal hernia is seen.  Mild degenerative spurring is seen in the thoracic spine.        CT scan of the chest with IV contrast demonstrating no findings of PE.  Emphysema.     SUJIT JENKINS MD       Electronically Signed and Approved By: SUJIT JENKINS MD on 1/28/2024 at 18:08             XR Chest 1 View    Result Date: 1/28/2024  PROCEDURE: XR CHEST 1 VW   COMPARISON: [facility], CR, XR CHEST 2 VW, 12/04/2023, 12:06.  INDICATIONS: SHORTNESS OF BREATH  FINDINGS:  The heart is normal in size.  The lungs are well-expanded and free of infiltrates.  Bony structures appear intact.       No active disease is seen.       SUJIT JENKINS MD       Electronically Signed and Approved By: SUJIT JENKINS MD on 1/28/2024 at 16:22                Differential Diagnosis and Discussion:    Cough: Differential diagnosis includes but is not limited to pneumonia, acute bronchitis, upper respiratory infection, ACE inhibitor use, allergic reaction, epiglottitis, seasonal allergies, chemical irritants, exercise-induced asthma, viral syndrome.  Dyspnea: Differential diagnosis includes but is not limited to metabolic acidosis, neurological disorders, psychogenic, asthma, pneumothorax, upper airway obstruction, COPD, pneumonia, noncardiogenic pulmonary edema, interstitial lung disease, anemia, congestive heart failure, and pulmonary embolism    All labs were reviewed and interpreted by me.  All X-rays impressions were independently interpreted by me.  EKG was interpreted by supervising attending.  CT scan radiology impression was interpreted by me.    MDM     The patient´s CBC that was reviewed and interpreted by me shows no abnormalities of critical concern. Of note, there is no anemia requiring a blood transfusion and the platelet count is acceptable.  COVID, influenza, and RSV are negative.  Troponin within normal limits.  Patient denies chest pain.  CT chest with contrast shows no evidence of PE.  Due to the patient's oxygen saturation being 86% on room air upon arrival, we placed patient on 2 L oxygen via nasal cannula.  Patient's oxygen felipe to 94%.  We took the patient off oxygen and went to ambulate the patient to the restroom her oxygen saturation dropped to 87%.  Patient states she is not on oxygen at home.  I spoke with hospitalist  who agrees to admit  the patient.          Patient Care Considerations:          Consultants/Shared Management Plan:    I spoke with hospitalist  who agrees to admit the patient.    Social Determinants of Health:          Disposition and Care Coordination:    Admit:   Through independent evaluation of the patient's history, physical, and imperical data, the patient meets criteria for inpatient admission to the hospital.        Final diagnoses:   Dyspnea on exertion   Hypoxia        ED Disposition       ED Disposition   Decision to Admit    Condition   --    Comment   --               This medical record created using voice recognition software.             Thony Andre PA-C  01/28/24 0516

## 2024-01-28 NOTE — H&P
[facility]  Date: 2024   Patient Name: Gloria Sunshine  : 1960  MRN: [id number]  Primary Care Physician:  Anisa Jackson, ELENA  Date of admission: 2024    Subjective   Subjective     Chief Complaint: Shortness of breath    HPI:    Gloria Sunshine is a 63 y.o. female past medical history of dyslipidemia, hypertension, and COPD who presents emergency department with shortness of breath    Patient states that she has not been feeling well for several days.  No fever or chills.  Still mild cough and a tickle in the back of the throat that makes her cough.  Patient has been using her albuterol inhaler regularly but has not seen much improvement.  As result she came there for further evaluate    In the emergency department the patient's vital signs are as follows: Temperature is 98, pulse 73, respiratory is 26, blood pressure 140/94, satting 86% on room air on presentation.  She was then given 2 L when up to 94% and then taken off and went back down to 88% at rest.  CBC shows no abnormalities.  CMP shows a potassium of 2.9 and a calcium of 10.6.  BNP and troponin were both normal.  CT of the chest shows no PE.  Patient was given methylprednisolone.  She will be admitted to hospital for further workup of acute COPD exacerbation leading to acute hypoxic respiratory failure.    All systems reviewed abnormalities noted above    Personal History     Past Medical History:  Dyslipidemia  Hypertension  COPD    Past Surgical History:  Cyst removal  Tubal abdominal ligation    Family History:   No history of genetic lung disease    Social History:   Every day smoker.  Did vape at some point in past.  No alcohol.    Home Medications:  albuterol, albuterol sulfate HFA, atorvastatin, azithromycin, benzonatate, budesonide-formoterol, escitalopram, hydrOXYzine, hydroCHLOROthiazide, mirtazapine, and multivitamin with minerals    Allergies:  Allergies    Allergen Reactions    Sulfamethoxazole Nausea And Vomiting    Trimethoprim Nausea And Vomiting    Epinephrine Palpitations    Penicillins Rash         Objective   Objective     Vitals:   Temp:  [98 °F (36.7 °C)] 98 °F (36.7 °C)  Heart Rate:  [73-99] 73  Resp:  [26] 26  BP: (144-157)/(94-99) 144/94  Flow (L/min):  [2] 2    Physical Exam    Constitutional no acute distress.  Thin   Eyes: Pupils equal, sclerae anicteric, no conjunctival injection   HENT: NCAT, mucous membranes moist   Neck: Supple, no thyromegaly, no lymphadenopathy, trachea midline   Respiratory: Wheezing and rhonchi throughout   Cardiovascular: RRR, no murmurs, rubs, or gallops, palpable pedal pulses bilaterally   Gastrointestinal: Positive bowel sounds, soft, nontender, nondistended   Musculoskeletal: No bilateral ankle edema, no clubbing or cyanosis to extremities   Psychiatric: Appropriate affect, cooperative   Neurologic: Oriented x 3, strength symmetric in all extremities, Cranial Nerves grossly intact to confrontation, speech clear   Skin: No rashes     Result Review    Result Review:  I have personally reviewed the results from the time of this admission to 1/28/2024 18:30 EST and agree with these findings:  Potassium of 2 point    Assessment & Plan   Assessment / Plan     Assessment/Plan:   Acute hypoxic respiratory failure  Acute COPD exacerbation  Hypertension  Hypokalemia  Mild hypercalcemia  Protein calorie malnutrition      Plan:  -- Admit to hospital service  -- Continue oxygen for saturations less than 90%  -- Brovana/Pulmicort/DuoNeb  -- Will start doxycycline for anti-inflammatory properties  -- Pro-Umair  -- Strep and Legionella urine antigen  -- Prednisone 40 mg  -- Consult dietitian due to protein calorie malnutrition with a BMI of 17    DVT prophylaxis:  Lovenox        CODE STATUS:     Full code    Admission Status:  I believe this patient meets observation status.    Electronically signed by Nathan Lynn MD, 01/28/24, 6:30 PM  EST.

## 2024-01-29 LAB
ALBUMIN SERPL-MCNC: 4.1 G/DL (ref 3.5–5.2)
ALBUMIN/GLOB SERPL: 1.9 G/DL
ALP SERPL-CCNC: 61 U/L (ref 39–117)
ALT SERPL W P-5'-P-CCNC: 15 U/L (ref 1–33)
ANION GAP SERPL CALCULATED.3IONS-SCNC: 12.9 MMOL/L (ref 5–15)
AST SERPL-CCNC: 18 U/L (ref 1–32)
BASOPHILS # BLD AUTO: 0.01 10*3/MM3 (ref 0–0.2)
BASOPHILS NFR BLD AUTO: 0.1 % (ref 0–1.5)
BILIRUB SERPL-MCNC: 0.2 MG/DL (ref 0–1.2)
BUN SERPL-MCNC: 14 MG/DL (ref 8–23)
BUN/CREAT SERPL: 18.9 (ref 7–25)
CALCIUM SPEC-SCNC: 9.7 MG/DL (ref 8.6–10.5)
CHLORIDE SERPL-SCNC: 100 MMOL/L (ref 98–107)
CO2 SERPL-SCNC: 25.1 MMOL/L (ref 22–29)
CREAT SERPL-MCNC: 0.74 MG/DL (ref 0.57–1)
DEPRECATED RDW RBC AUTO: 47.8 FL (ref 37–54)
EGFRCR SERPLBLD CKD-EPI 2021: 91.6 ML/MIN/1.73
EOSINOPHIL # BLD AUTO: 0.01 10*3/MM3 (ref 0–0.4)
EOSINOPHIL NFR BLD AUTO: 0.1 % (ref 0.3–6.2)
ERYTHROCYTE [DISTWIDTH] IN BLOOD BY AUTOMATED COUNT: 14.8 % (ref 12.3–15.4)
GLOBULIN UR ELPH-MCNC: 2.2 GM/DL
GLUCOSE SERPL-MCNC: 182 MG/DL (ref 65–99)
HCT VFR BLD AUTO: 35.7 % (ref 34–46.6)
HGB BLD-MCNC: 11.9 G/DL (ref 12–15.9)
IMM GRANULOCYTES # BLD AUTO: 0.02 10*3/MM3 (ref 0–0.05)
IMM GRANULOCYTES NFR BLD AUTO: 0.3 % (ref 0–0.5)
L PNEUMO1 AG UR QL IA: NEGATIVE
LYMPHOCYTES # BLD AUTO: 1.3 10*3/MM3 (ref 0.7–3.1)
LYMPHOCYTES NFR BLD AUTO: 17.2 % (ref 19.6–45.3)
MAGNESIUM SERPL-MCNC: 1.9 MG/DL (ref 1.6–2.4)
MCH RBC QN AUTO: 29 PG (ref 26.6–33)
MCHC RBC AUTO-ENTMCNC: 33.3 G/DL (ref 31.5–35.7)
MCV RBC AUTO: 87.1 FL (ref 79–97)
MONOCYTES # BLD AUTO: 0.4 10*3/MM3 (ref 0.1–0.9)
MONOCYTES NFR BLD AUTO: 5.3 % (ref 5–12)
NEUTROPHILS NFR BLD AUTO: 5.81 10*3/MM3 (ref 1.7–7)
NEUTROPHILS NFR BLD AUTO: 77 % (ref 42.7–76)
NRBC BLD AUTO-RTO: 0 /100 WBC (ref 0–0.2)
PLATELET # BLD AUTO: 337 10*3/MM3 (ref 140–450)
PMV BLD AUTO: 9 FL (ref 6–12)
POTASSIUM SERPL-SCNC: 4 MMOL/L (ref 3.5–5.2)
PROT SERPL-MCNC: 6.3 G/DL (ref 6–8.5)
RBC # BLD AUTO: 4.1 10*6/MM3 (ref 3.77–5.28)
S PNEUM AG SPEC QL LA: NEGATIVE
SODIUM SERPL-SCNC: 138 MMOL/L (ref 136–145)
TSH SERPL DL<=0.05 MIU/L-ACNC: 0.57 UIU/ML (ref 0.27–4.2)
WBC NRBC COR # BLD AUTO: 7.55 10*3/MM3 (ref 3.4–10.8)

## 2024-01-29 PROCEDURE — 84443 ASSAY THYROID STIM HORMONE: CPT | Performed by: INTERNAL MEDICINE

## 2024-01-29 PROCEDURE — 25010000002 ONDANSETRON PER 1 MG: Performed by: PHYSICIAN ASSISTANT

## 2024-01-29 PROCEDURE — 80053 COMPREHEN METABOLIC PANEL: CPT | Performed by: INTERNAL MEDICINE

## 2024-01-29 PROCEDURE — 99232 SBSQ HOSP IP/OBS MODERATE 35: CPT | Performed by: INTERNAL MEDICINE

## 2024-01-29 PROCEDURE — 83735 ASSAY OF MAGNESIUM: CPT | Performed by: INTERNAL MEDICINE

## 2024-01-29 PROCEDURE — 87899 AGENT NOS ASSAY W/OPTIC: CPT | Performed by: INTERNAL MEDICINE

## 2024-01-29 PROCEDURE — 96375 TX/PRO/DX INJ NEW DRUG ADDON: CPT

## 2024-01-29 PROCEDURE — 94799 UNLISTED PULMONARY SVC/PX: CPT

## 2024-01-29 PROCEDURE — 63710000001 PREDNISONE PER 1 MG: Performed by: INTERNAL MEDICINE

## 2024-01-29 PROCEDURE — 87449 NOS EACH ORGANISM AG IA: CPT | Performed by: INTERNAL MEDICINE

## 2024-01-29 PROCEDURE — 94664 DEMO&/EVAL PT USE INHALER: CPT

## 2024-01-29 PROCEDURE — 85025 COMPLETE CBC W/AUTO DIFF WBC: CPT | Performed by: INTERNAL MEDICINE

## 2024-01-29 PROCEDURE — G0378 HOSPITAL OBSERVATION PER HR: HCPCS

## 2024-01-29 RX ORDER — MULTIPLE VITAMINS W/ MINERALS TAB 9MG-400MCG
1 TAB ORAL DAILY
Status: DISCONTINUED | OUTPATIENT
Start: 2024-01-29 | End: 2024-01-30 | Stop reason: HOSPADM

## 2024-01-29 RX ORDER — CYCLOBENZAPRINE HCL 5 MG
5 TABLET ORAL ONCE AS NEEDED
Status: COMPLETED | OUTPATIENT
Start: 2024-01-29 | End: 2024-01-29

## 2024-01-29 RX ORDER — MIRTAZAPINE 15 MG/1
7.5 TABLET, FILM COATED ORAL NIGHTLY
Status: DISCONTINUED | OUTPATIENT
Start: 2024-01-29 | End: 2024-01-30 | Stop reason: HOSPADM

## 2024-01-29 RX ORDER — ONDANSETRON 2 MG/ML
4 INJECTION INTRAMUSCULAR; INTRAVENOUS EVERY 4 HOURS PRN
Status: DISCONTINUED | OUTPATIENT
Start: 2024-01-29 | End: 2024-01-30 | Stop reason: HOSPADM

## 2024-01-29 RX ORDER — HYDROXYZINE HYDROCHLORIDE 25 MG/1
25 TABLET, FILM COATED ORAL EVERY 8 HOURS PRN
Status: DISCONTINUED | OUTPATIENT
Start: 2024-01-29 | End: 2024-01-30 | Stop reason: HOSPADM

## 2024-01-29 RX ORDER — ATORVASTATIN CALCIUM 20 MG/1
20 TABLET, FILM COATED ORAL NIGHTLY
Status: DISCONTINUED | OUTPATIENT
Start: 2024-01-29 | End: 2024-01-30 | Stop reason: HOSPADM

## 2024-01-29 RX ORDER — ESCITALOPRAM OXALATE 10 MG/1
5 TABLET ORAL DAILY
Status: DISCONTINUED | OUTPATIENT
Start: 2024-01-29 | End: 2024-01-30 | Stop reason: HOSPADM

## 2024-01-29 RX ADMIN — PREDNISONE 40 MG: 20 TABLET ORAL at 08:33

## 2024-01-29 RX ADMIN — DOXYCYCLINE 100 MG: 100 INJECTION, POWDER, LYOPHILIZED, FOR SOLUTION INTRAVENOUS at 21:54

## 2024-01-29 RX ADMIN — IPRATROPIUM BROMIDE AND ALBUTEROL SULFATE 3 ML: .5; 3 SOLUTION RESPIRATORY (INHALATION) at 11:58

## 2024-01-29 RX ADMIN — DOXYCYCLINE 100 MG: 100 INJECTION, POWDER, LYOPHILIZED, FOR SOLUTION INTRAVENOUS at 08:33

## 2024-01-29 RX ADMIN — ONDANSETRON HYDROCHLORIDE 4 MG: 2 SOLUTION INTRAMUSCULAR; INTRAVENOUS at 03:56

## 2024-01-29 RX ADMIN — IPRATROPIUM BROMIDE AND ALBUTEROL SULFATE 3 ML: .5; 3 SOLUTION RESPIRATORY (INHALATION) at 02:54

## 2024-01-29 RX ADMIN — HYDROXYZINE HYDROCHLORIDE 25 MG: 25 TABLET, FILM COATED ORAL at 01:07

## 2024-01-29 RX ADMIN — ARFORMOTEROL TARTRATE 15 MCG: 15 SOLUTION RESPIRATORY (INHALATION) at 19:28

## 2024-01-29 RX ADMIN — IPRATROPIUM BROMIDE AND ALBUTEROL SULFATE 3 ML: .5; 3 SOLUTION RESPIRATORY (INHALATION) at 16:29

## 2024-01-29 RX ADMIN — BUDESONIDE 0.5 MG: 0.5 SUSPENSION RESPIRATORY (INHALATION) at 19:28

## 2024-01-29 RX ADMIN — IPRATROPIUM BROMIDE AND ALBUTEROL SULFATE 3 ML: .5; 3 SOLUTION RESPIRATORY (INHALATION) at 23:47

## 2024-01-29 RX ADMIN — Medication 1 TABLET: at 08:33

## 2024-01-29 RX ADMIN — MIRTAZAPINE 7.5 MG: 15 TABLET, FILM COATED ORAL at 01:07

## 2024-01-29 RX ADMIN — Medication 10 ML: at 21:56

## 2024-01-29 RX ADMIN — IPRATROPIUM BROMIDE AND ALBUTEROL SULFATE 3 ML: .5; 3 SOLUTION RESPIRATORY (INHALATION) at 07:35

## 2024-01-29 RX ADMIN — ATORVASTATIN CALCIUM 20 MG: 20 TABLET, FILM COATED ORAL at 21:55

## 2024-01-29 RX ADMIN — IPRATROPIUM BROMIDE AND ALBUTEROL SULFATE 3 ML: .5; 3 SOLUTION RESPIRATORY (INHALATION) at 19:29

## 2024-01-29 RX ADMIN — ESCITALOPRAM OXALATE 5 MG: 10 TABLET ORAL at 08:33

## 2024-01-29 RX ADMIN — HYDROXYZINE HYDROCHLORIDE 25 MG: 25 TABLET, FILM COATED ORAL at 21:55

## 2024-01-29 RX ADMIN — CYCLOBENZAPRINE HYDROCHLORIDE 5 MG: 5 TABLET, FILM COATED ORAL at 01:53

## 2024-01-29 RX ADMIN — ATORVASTATIN CALCIUM 20 MG: 20 TABLET, FILM COATED ORAL at 01:07

## 2024-01-29 RX ADMIN — ARFORMOTEROL TARTRATE 15 MCG: 15 SOLUTION RESPIRATORY (INHALATION) at 07:35

## 2024-01-29 RX ADMIN — BUDESONIDE 0.5 MG: 0.5 SUSPENSION RESPIRATORY (INHALATION) at 07:35

## 2024-01-29 RX ADMIN — MIRTAZAPINE 7.5 MG: 15 TABLET, FILM COATED ORAL at 21:54

## 2024-01-29 NOTE — CONSULTS
Discharge Planning Assessment  [facility]     Patient Name: Gloria Sunshine  MRN: [id number]  Today's Date: 1/29/2024    Admit Date: 1/28/2024  Plan: Pt admitted due to COPD exacerbation. JAS spoke with pt to assess needs. Pt lives at home with her  and is independent in ADLs. Pt is currently on 2L 02 and does not wear at baseline. Will follow for 6MWT. Pt does use nebulizer treatments at home but is needing a refill of albuterol. Pt denies any discharge needs at this time and plans to return home once stable. Pt asked that pharmacy be switched to [facility] in [facility]. PCP confirmed.   Discharge Needs Assessment       Row Name 01/29/24 8417       Living Environment    People in Home spouse    Name(s) of People in Home Pt lives in [place] with her     Current Living Arrangements home    Potentially Unsafe Housing Conditions none    Primary Care Provided by self    Provides Primary Care For no one    Family Caregiver if Needed spouse    Family Caregiver Names Shira Sunshine- Spouse    Quality of Family Relationships supportive;involved;helpful    Able to Return to Prior Arrangements yes       Resource/Environmental Concerns    Resource/Environmental Concerns none       Transition Planning    Patient/Family Anticipates Transition to home    Patient/Family Anticipated Services at Transition durable medical equipment    Transportation Anticipated family or friend will provide       Discharge Needs Assessment    Readmission Within the Last 30 Days no previous admission in last 30 days    Equipment Currently Used at Home nebulizer    Concerns to be Addressed basic needs;discharge planning    Anticipated Changes Related to Illness none    Equipment Needed After Discharge oxygen              Discharge Plan       Row Name 01/29/24 4722       Plan    Plan Pt admitted due to COPD exacerbation. JAS spoke with pt to assess needs. Pt lives at home with her  and is independent in ADLs. Pt is currently on 2L  02 and does not wear at baseline. Will follow for 6MWT. Pt does use nebulizer treatments at home but is needing a refill of albuterol. Pt denies any discharge needs at this time and plans to return home once stable. Pt asked that pharmacy [facility] switched to [facility] [place]. PCP confirmed.    Patient/Family in Agreement with Plan yes              Demographic Summary       Row Name 01/29/24 1550       General Information    Admission Type observation    Arrived From emergency department    Referral Source admission list    Reason for Consult discharge planning    Preferred Language English       Contact Information    Permission Granted to Share Info With family/designee              Functional Status       Row Name 01/29/24 1550       Functional Status    Usual Activity Tolerance good    Current Activity Tolerance good       Functional Status, IADL    Medications independent    Meal Preparation independent    Housekeeping independent    Laundry independent    Shopping independent       Mental Status    General Appearance WDL WDL       Mental Status Summary    Recent Changes in Mental Status/Cognitive Functioning no changes              Psychosocial       Row Name 01/29/24 1551       Behavior WDL    Behavior WDL WDL       Emotion Mood WDL    Emotion/Mood/Affect WDL WDL       Speech WDL    Speech WDL WDL       Perceptual State WDL    Perceptual State WDL WDL       Thought Process WDL    Thought Process WDL WDL       Intellectual Performance WDL    Intellectual Performance WDL WDL       Coping/Stress    Sources of Support spouse    Techniques to [place] with Loss/Stress/Change not applicable    Reaction to Health Status accepting    Understanding of Condition and Treatment adequate understanding of medical condition       Developmental Stage (Eriksson's)    Developmental Stage Stage 7 (35-65 years/Middle Adulthood) Generativity vs. Stagnation           LEILANI Lugo

## 2024-01-29 NOTE — PLAN OF CARE
Goal Outcome Evaluation:      Pt new ED admission this shift. Pt c/o pain/discomfort this shift, administered prn pain meds per MAR and applied heating pad to affected area as ordered. Pt states that she does not currently see a pulmonologist and would like to have pulmonology consulted to help manage her COPD symptoms. Pt is currently resting with no apparent distress at this time, call light in reach. No new issues or new needs noted at this time.

## 2024-01-29 NOTE — PLAN OF CARE
Goal Outcome Evaluation:  Plan of Care Reviewed With: patient        Progress: improving  Outcome Evaluation: PT is AAOx4, VSS, 2L/NC, SOA with activity, no c/o of N/V/D  or acute pain. Ad-sumaya, tolerating IV Doxycycline and regular diet. No acute events this shift. Continue with current POC.

## 2024-01-29 NOTE — CASE MANAGEMENT/SOCIAL WORK
Spoke with patient and spouse at bedside regarding current COPD management at home.  states she has not been diagnosed with COPD, but does say doctors mention each time she is admitted. Patient states she has never had a PFT and has never seen a pulmonologist.  does recall having childhood asthma but states she outgrew it. Eosinophils are noted to be elevated 1/17/24 and on admission 1/28.  Asthma workup may be warranted and outpatient PFT once patient has returned to baseline can help diagnose possible ASTHMA COPD overlap syndrome. Ms. Sunshine does have over a 40 year history of smoking but states she has not smoked for about a week now.      Patient recently prescribed Symbicort and Albuterol without a spacer. MDI administration with a spacer was instructed and patient was provided with spacer for discharge.    Pulmonary has been consulted I will continue to follow for COPD action plan instructions and education at discharge.  COPD clinic referral placed.

## 2024-01-29 NOTE — CONSULTS
"Nutrition Services    Patient Name: Gloria Sunshine  YOB: 1960  MRN: [id number]  Admission date: 1/28/2024      CLINICAL NUTRITION ASSESSMENT      Reason for Assessment  Physician Consult, MST Score 2+, and BMI     H&P:  Past Medical History:   Diagnosis Date    Elevated cholesterol     Hyperlipidemia     Hypertension         Current Problems:   Active Hospital Problems    Diagnosis     **COPD exacerbation         Nutrition/Diet History         Narrative   63 year old female admitted with SOA/acute exacerbation of COPD resulting in hypoxic respiratory failure.  See PMH above.     Pt fed self 75% of noon meal.  Due to low BMI, elevated glucose, likely secondary to steroidal breathing treatments, start pt on Boost Glucose Control with meals.    Due to doxycycline therapy RD recommends foods containing cultures to prevent sterile gut.  Pt agreeable to cottage cheese daily.   MNT conducted with pt regarding steroid therapy and effect on blood glucose levels as well as maintaining healthy bacteria in the GI tract. Pt voices understanding.      NFPE conducted by RD and pt meets criteria for severe malnutrition related to severe loss of muscle and fat stores.  Pt agreeable to liberalizing diet to increase PO intake and menu options.  Pt agreeable to cottage cheese with salt and pepper daily.      Anthropometrics        Current Height, Weight Height: 180.3 cm (71\")  Weight: 53.4 kg (117 lb 11.6 oz)   Current BMI Body mass index is 16.42 kg/m².   BMI Classification Underweight   % IBW 75%   Adjusted Body Weight (ABW) NA   Weight Hx  Wt Readings from Last 30 Encounters:   01/29/24 0748 53.4 kg (117 lb 11.6 oz)   01/28/24 1555 55.9 kg (123 lb 3.8 oz)   12/04/23 1122 52.6 kg (116 lb)   11/25/23 0539 51.3 kg (113 lb 1.5 oz)   11/25/23 0045 51.3 kg (113 lb 1.5 oz)   11/24/23 1940 51.7 kg (114 lb)   08/09/23 1040 54 kg (119 lb)   07/21/23 0112 56.4 kg (124 lb 5.4 oz)          Wt Change Observation Weight " decrease 3 kg (5.3%) in 6 months.     Estimated/Assessed Needs  Estimated Needs based on: Ideal Body Weight       Energy Requirements 30-35 kcal/kg    EST Needs (kcal/day) 8470-5747       Protein Requirements 1.0-1.2 g/kg   EST Daily Needs (g/day) 71       Fluid Requirements 30 ml/kg    Estimated Needs (mL/day) 2470 (10-11 cups)     Labs/Medications         Pertinent Labs Reviewed.   Results from last 7 days   Lab Units 01/29/24  0539 01/28/24  1600   SODIUM mmol/L 138 140   POTASSIUM mmol/L 4.0 2.9*   CHLORIDE mmol/L 100 97*   CO2 mmol/L 25.1 28.4   BUN mg/dL 14 9   CREATININE mg/dL 0.74 0.73   CALCIUM mg/dL 9.7 10.6*   BILIRUBIN mg/dL 0.2 0.3   ALK PHOS U/L 61 75   ALT (SGPT) U/L 15 16   AST (SGOT) U/L 18 24   GLUCOSE mg/dL 182* 102*     Results from last 7 days   Lab Units 01/29/24  0539 01/28/24  1600   MAGNESIUM mg/dL 1.9 2.1   HEMOGLOBIN g/dL 11.9* 14.8   HEMATOCRIT % 35.7 44.0     COVID19   Date Value Ref Range Status   01/28/2024 Not Detected Not Detected - Ref. Range Final     Lab Results   Component Value Date    HGBA1C 5.60 11/25/2023         Pertinent Medications Reviewed.Doxycycline for inflammation indicators. Deltasone may elevate BG, Remeron low dose 7.5 mg in evening- may stimulate appetite,      Malnutrition Severity Assessment      Patient meets criteria for : Severe Malnutrition  Malnutrition Type (last 8 hours)       Malnutrition Severity Assessment       Row Name 01/29/24 1511       Malnutrition Severity Assessment    Malnutrition Type Acute Disease or Injury - Related Malnutrition      Row Name 01/29/24 1511       Insufficient Energy Intake     Insufficient Energy Intake Findings None      Row Name 01/29/24 1511       Unintentional Weight Loss     Unintentional Weight Loss Findings None      Row Name 01/29/24 1511       Muscle Loss    Loss of Muscle Mass Findings Severe    [place] Region Severe - deep hollowing/scooping, lack of muscle to touch, facial bones well defined    Clavicle Bone Region  Severe - protruding prominent bone    Acromion Bone Region Severe - squared shoulders, bones, and acromion process protrusion prominent    Scapular Bone Region Severe - prominent bones, depressions easily visible between ribs, scapula, spine, shoulders    Dorsal Hand Region Moderate - slight depression    Patellar Region Severe - prominent bone, square looking, very little muscle definition    Anterior Thigh Region Moderate - mild depression on inner thigh    Posterior Calf Region Moderate - some roundness, slight firmness      Row Name 01/29/24 1511       Fat Loss    Subcutaneous Fat Loss Findings Severe    Orbital Region  Moderate -  somewhat hollowness, slightly dark circles    Upper Arm Region Severe - mostly skin, very little space between folds, fingers touch    Thoracic & Lumbar Region Severe - ribs visible with prominent depressions, iliac crest very prominent      Row Name 01/29/24 1511       Declining Functional Status    Declining Functional Status Findings N/A      Row Name 01/29/24 1511       Criteria Met (Must meet criteria for severity in at least 2 of these categories: M Wasting, Fat Loss, Fluid, Secondary Signs, Wt. Status, Intake)    Patient meets criteria for  Severe Malnutrition                     Nutrition Diagnosis         Nutrition Dx Problem 1 Severe malnutrition related to increased nutrient needs due to catabolic disease as evidenced by unintended weight change. and body composition changes.     Nutrition Intervention           Current Nutrition Orders & Evaluation of Intake       Current PO Diet Diet: Regular/House Diet; Texture: Regular Texture (IDDSI 7); Fluid Consistency: Thin (IDDSI 0)   Supplement Orders Placed This Encounter      Dietary Nutrition Supplements Boost Glucose Control (Glucerna Shake)           Nutrition Intervention/Prescription        Boost Glucose Control with meals +750 calories, 42 g protein        Medical Nutrition Therapy/Nutrition Education          Learner      Readiness Patient  Acceptance     Method     Response Explanation  Verbalizes understanding     Monitor/Evaluation        Monitor Per protocol, PO intake, Supplement intake, Pertinent labs, POC/GOC     Nutrition Discharge Plan         Recommend to continue oral nutrition supplements on discharge.      Electronically signed by:  Fay Potter RD  01/29/24 15:13 EST

## 2024-01-29 NOTE — PROGRESS NOTES
[facility]   Hospitalist Progress Note  Date: 2024  Patient Name: Gloria Sunshine  : 1960  MRN: [id number]  Date of admission: 2024      Subjective   Subjective     Chief Complaint:   Shortness of breath     Summary:   Gloria Sunshine is a 63 y.o. female with  medical history of dyslipidemia, hypertension, and asthma/COPD who presents emergency department with shortness of breath     Patient states she has not been feeling well for several days.  No fever or chills.  Still mild cough and a tickle in the back of the throat that makes her cough.  Patient has been using her albuterol inhaler regularly but has not seen much improvement.  As result she came there for further evaluate. patient was admitted 2023-2023 for COPD exacerbation, and 2023-2023 NSTEMI and exacerbation of COPD.  Patient also seen in urgent care early December for COPD exacerbation.  Patient does not have a pulmonologist she regularly sees.  At home patient uses Symbicort inhaler with albuterol inhaler and nebulizer used for rescue.     In the emergency department the patient's vital signs are as follows: Temperature is 98, pulse 73, respiratory is 26, blood pressure 140/94, satting 86% on room air on presentation.  She was then given 2 L when up to 94%, later taken off and went back down to 88% at rest.  CBC shows no abnormalities.  CMP shows a potassium of 2.9 and a calcium of 10.6.  BNP and troponin were both normal.  CT of the chest shows no PE.  Patient was given methylprednisolone.  She will be admitted to hospital for further workup of acute COPD exacerbation     Interval Followup:   Endorses slight improvement in symptoms since presenting, remains on 2 L nasal cannula.  Will consult pulmonology today given patient's frequent readmissions and poorly controlled asthma/COPD at home.  Patient is a smoker, states she has cut back, however still smoking stating a pack will last  around 3 days.  Patient also requesting TSH to be checked      Objective   Objective     Vitals:   Temp:  [97.8 °F (36.6 °C)-98.6 °F (37 °C)] 98.6 °F (37 °C)  Heart Rate:  [64-86] 74  Resp:  [18-20] 18  BP: (118-138)/(69-85) 126/71  Flow (L/min):  [2] 2  Physical Exam    Constitutional: Awake, alert, breathing comfortably with nasal cannula in place   Eyes: Pupils equal, sclerae anicteric, no conjunctival injection   HENT: NCAT, mucous membranes moist   Neck: Supple, no thyromegaly, no lymphadenopathy, trachea midline   Respiratory: Poor aeration heard on inspiration, tight expiratory wheezing heard diffusely   Cardiovascular: RRR, no murmurs, rubs, or gallops, palpable pedal pulses bilaterally   Gastrointestinal: Positive bowel sounds, soft, nontender, nondistended   Musculoskeletal: No bilateral ankle edema, no clubbing or cyanosis to extremities   Neurologic: Oriented x 3, strength symmetric in all extremities, Cranial Nerves grossly intact to confrontation, speech clear   Skin: No rashes     Result Review    Result Review:  I have personally reviewed the results from 1/29/2024 and agree with these findings:  [x]  Laboratory  []  Microbiology  [x]  Radiology  [x]  EKG/Telemetry   []  Cardiology/Vascular   []  Pathology  []  Old records  []  Other:    Assessment & Plan   Assessment / Plan     Assessment/Plan:  COPD in acute exacerbation  Emphysema on CT  Hypoxia  Hypertension  Hypokalemia  Mild hypercalcemia    Plan:  Patient remains admitted to the hospital for further care and management  Consulting pulmonology given patient's frequent readmissions and ongoing issues  Continues on supplemental oxygen  Scheduled and as needed breathing treatments  Started on a short course of doxycycline  Urinary strep and Legionella ordered  Continues on systemic steroids  Home medications reviewed and resumed as indicated  Imaging reviewed, no pulmonary embolus noted.  Does have emphysema     Discussed plan with RN.    DVT  prophylaxis:  Medical DVT prophylaxis orders are present.        CODE STATUS:   Level Of Support Discussed With: Patient  Code Status (Patient has no pulse and is not breathing): CPR (Attempt to Resuscitate)  Medical Interventions (Patient has pulse or is breathing): Full Support

## 2024-01-30 ENCOUNTER — READMISSION MANAGEMENT (OUTPATIENT)
Dept: CALL CENTER | Facility: HOSPITAL | Age: 64
End: 2024-01-30
Payer: COMMERCIAL

## 2024-01-30 VITALS
TEMPERATURE: 99 F | OXYGEN SATURATION: 92 % | DIASTOLIC BLOOD PRESSURE: 93 MMHG | SYSTOLIC BLOOD PRESSURE: 125 MMHG | HEART RATE: 77 BPM | RESPIRATION RATE: 18 BRPM | BODY MASS INDEX: 16.94 KG/M2 | HEIGHT: 71 IN | WEIGHT: 120.99 LBS

## 2024-01-30 PROBLEM — E43 SEVERE MALNUTRITION: Status: ACTIVE | Noted: 2024-01-30

## 2024-01-30 LAB
ANION GAP SERPL CALCULATED.3IONS-SCNC: 9.1 MMOL/L (ref 5–15)
BUN SERPL-MCNC: 11 MG/DL (ref 8–23)
BUN/CREAT SERPL: 16.7 (ref 7–25)
CALCIUM SPEC-SCNC: 8.9 MG/DL (ref 8.6–10.5)
CHLORIDE SERPL-SCNC: 105 MMOL/L (ref 98–107)
CO2 SERPL-SCNC: 24.9 MMOL/L (ref 22–29)
CREAT SERPL-MCNC: 0.66 MG/DL (ref 0.57–1)
DEPRECATED RDW RBC AUTO: 50.4 FL (ref 37–54)
EGFRCR SERPLBLD CKD-EPI 2021: 99.3 ML/MIN/1.73
ERYTHROCYTE [DISTWIDTH] IN BLOOD BY AUTOMATED COUNT: 15.4 % (ref 12.3–15.4)
GLUCOSE SERPL-MCNC: 131 MG/DL (ref 65–99)
HCT VFR BLD AUTO: 33.6 % (ref 34–46.6)
HGB BLD-MCNC: 11 G/DL (ref 12–15.9)
MAGNESIUM SERPL-MCNC: 1.9 MG/DL (ref 1.6–2.4)
MCH RBC QN AUTO: 29.2 PG (ref 26.6–33)
MCHC RBC AUTO-ENTMCNC: 32.7 G/DL (ref 31.5–35.7)
MCV RBC AUTO: 89.1 FL (ref 79–97)
PLATELET # BLD AUTO: 314 10*3/MM3 (ref 140–450)
PMV BLD AUTO: 9.4 FL (ref 6–12)
POTASSIUM SERPL-SCNC: 3.5 MMOL/L (ref 3.5–5.2)
QT INTERVAL: 389 MS
QTC INTERVAL: 456 MS
RBC # BLD AUTO: 3.77 10*6/MM3 (ref 3.77–5.28)
SODIUM SERPL-SCNC: 139 MMOL/L (ref 136–145)
WBC NRBC COR # BLD AUTO: 13.6 10*3/MM3 (ref 3.4–10.8)

## 2024-01-30 PROCEDURE — 94664 DEMO&/EVAL PT USE INHALER: CPT

## 2024-01-30 PROCEDURE — 94618 PULMONARY STRESS TESTING: CPT

## 2024-01-30 PROCEDURE — 97161 PT EVAL LOW COMPLEX 20 MIN: CPT

## 2024-01-30 PROCEDURE — 97165 OT EVAL LOW COMPLEX 30 MIN: CPT

## 2024-01-30 PROCEDURE — 83735 ASSAY OF MAGNESIUM: CPT | Performed by: INTERNAL MEDICINE

## 2024-01-30 PROCEDURE — 94799 UNLISTED PULMONARY SVC/PX: CPT

## 2024-01-30 PROCEDURE — 85027 COMPLETE CBC AUTOMATED: CPT | Performed by: INTERNAL MEDICINE

## 2024-01-30 PROCEDURE — 99223 1ST HOSP IP/OBS HIGH 75: CPT | Performed by: STUDENT IN AN ORGANIZED HEALTH CARE EDUCATION/TRAINING PROGRAM

## 2024-01-30 PROCEDURE — 99239 HOSP IP/OBS DSCHRG MGMT >30: CPT | Performed by: STUDENT IN AN ORGANIZED HEALTH CARE EDUCATION/TRAINING PROGRAM

## 2024-01-30 PROCEDURE — 80048 BASIC METABOLIC PNL TOTAL CA: CPT | Performed by: INTERNAL MEDICINE

## 2024-01-30 PROCEDURE — G0378 HOSPITAL OBSERVATION PER HR: HCPCS

## 2024-01-30 PROCEDURE — 63710000001 PREDNISONE PER 1 MG: Performed by: INTERNAL MEDICINE

## 2024-01-30 RX ORDER — BUDESONIDE AND FORMOTEROL FUMARATE DIHYDRATE 80; 4.5 UG/1; UG/1
2 AEROSOL RESPIRATORY (INHALATION) 2 TIMES DAILY
Qty: 10.2 G | Refills: 0 | Status: SHIPPED | OUTPATIENT
Start: 2024-01-30

## 2024-01-30 RX ORDER — BENZONATATE 100 MG/1
100 CAPSULE ORAL 3 TIMES DAILY PRN
Qty: 14 CAPSULE | Refills: 0 | Status: SHIPPED | OUTPATIENT
Start: 2024-01-30

## 2024-01-30 RX ORDER — ALBUTEROL SULFATE 2.5 MG/3ML
2.5 SOLUTION RESPIRATORY (INHALATION) EVERY 4 HOURS PRN
Qty: 180 ML | Refills: 0 | Status: SHIPPED | OUTPATIENT
Start: 2024-01-30

## 2024-01-30 RX ORDER — TIOTROPIUM BROMIDE INHALATION SPRAY 1.56 UG/1
2 SPRAY, METERED RESPIRATORY (INHALATION)
Qty: 4 G | Refills: 0 | Status: SHIPPED | OUTPATIENT
Start: 2024-01-30

## 2024-01-30 RX ORDER — DOXYCYCLINE 100 MG/1
100 CAPSULE ORAL EVERY 12 HOURS SCHEDULED
Qty: 6 CAPSULE | Refills: 0 | Status: SHIPPED | OUTPATIENT
Start: 2024-01-30 | End: 2024-02-02

## 2024-01-30 RX ORDER — BENZONATATE 100 MG/1
100 CAPSULE ORAL 3 TIMES DAILY PRN
Status: DISCONTINUED | OUTPATIENT
Start: 2024-01-30 | End: 2024-01-30 | Stop reason: HOSPADM

## 2024-01-30 RX ORDER — DOXYCYCLINE 100 MG/1
100 CAPSULE ORAL EVERY 12 HOURS SCHEDULED
Qty: 7 CAPSULE | Refills: 0 | Status: DISCONTINUED | OUTPATIENT
Start: 2024-01-30 | End: 2024-01-30 | Stop reason: HOSPADM

## 2024-01-30 RX ORDER — PREDNISONE 20 MG/1
40 TABLET ORAL
Qty: 2 TABLET | Refills: 0 | Status: SHIPPED | OUTPATIENT
Start: 2024-01-31

## 2024-01-30 RX ADMIN — ARFORMOTEROL TARTRATE 15 MCG: 15 SOLUTION RESPIRATORY (INHALATION) at 07:39

## 2024-01-30 RX ADMIN — DOXYCYCLINE 100 MG: 100 INJECTION, POWDER, LYOPHILIZED, FOR SOLUTION INTRAVENOUS at 08:23

## 2024-01-30 RX ADMIN — PREDNISONE 40 MG: 20 TABLET ORAL at 08:23

## 2024-01-30 RX ADMIN — IPRATROPIUM BROMIDE AND ALBUTEROL SULFATE 3 ML: .5; 3 SOLUTION RESPIRATORY (INHALATION) at 07:39

## 2024-01-30 RX ADMIN — Medication 10 ML: at 08:23

## 2024-01-30 RX ADMIN — Medication 1 TABLET: at 08:23

## 2024-01-30 RX ADMIN — BUDESONIDE 0.5 MG: 0.5 SUSPENSION RESPIRATORY (INHALATION) at 07:39

## 2024-01-30 RX ADMIN — ESCITALOPRAM OXALATE 5 MG: 10 TABLET ORAL at 08:23

## 2024-01-30 RX ADMIN — BENZONATATE 100 MG: 100 CAPSULE ORAL at 11:35

## 2024-01-30 RX ADMIN — IPRATROPIUM BROMIDE AND ALBUTEROL SULFATE 3 ML: .5; 3 SOLUTION RESPIRATORY (INHALATION) at 11:53

## 2024-01-30 NOTE — PLAN OF CARE
Goal Outcome Evaluation:  Plan of Care Reviewed With: patient        Progress: no change (first session)  Outcome Evaluation: Skilled OT services not recommended at this time as patient is independent with basic ADL/ transfers. She has been on room air this am with sats running low-mid 90s per EMR. Will discharge OT at this time with patient in agreement. May benefit from outpatient pulmonary rehab.      Anticipated Discharge Disposition (OT): home

## 2024-01-30 NOTE — CONSULTS
Pulmonary / Critical Care Consult Note      Patient Name: Gloria Sunshine  : 1960  MRN: [id number]  Primary Care Physician:  Anais May APRN  Referring Physician: Lucille Lugo*  Date of admission: 2024    Subjective   Subjective     Reason for Consult/ Chief Complaint:   COPD exacerbation    HPI:  Gloria Sunshine is a 63 y.o. female with history of hypertension, chronic tobacco use without formal COPD diagnosis, presents to the ED with shortness of breath.  In the ED patient was found to be hypoxic, 86% on room air and was placed on nasal cannula.  CT angio did not show any pulmonary embolism.  She was given breathing treatment as well as steroids and admitted to the hospital for further workup of acute COPD exacerbation.  This morning patient is doing well.  She is currently on room air.  She does not use oxygen at home.  Microbiology has been negative.  She reports improved cough which is now mostly nonproductive.  Of note patient reports quitting tobacco use 7 days ago.  Previously she smoked about a pack every 3 days for numerous years.  She has never been formally diagnosed with COPD and does not see pulmonary physician outpatient.    Review of Systems  Constitutional symptoms:  Denied complaints   Ear, nose, throat: Denied complaints  Cardiovascular:  Denied complaints  Respiratory: +dry cough; Denied complaints  Gastrointestinal: Denied complaints  Musculoskeletal: Denied complaints  Genitourinary: Denied complaints  Allergy / Immunology: Denied complaints  Hematologic: Denied complaints  Neurologic: Denied complaints  Skin: Denied complaints  Endocrine: Denied complaints  Psychiatric: Denied complaints      Personal History     Past Medical History:   Diagnosis Date    Elevated cholesterol     Hyperlipidemia     Hypertension        Past Surgical History:   Procedure Laterality Date    CYST REMOVAL Left     breast    TUBAL ABDOMINAL LIGATION         Family  History: family history is not on file. Otherwise pertinent FHx was reviewed and not pertinent to current issue.    Social History:  reports that she has been smoking cigarettes. She has a 11.50 pack-year smoking history. She has never used smokeless tobacco. She reports that she does not currently use alcohol. She reports that she does not currently use drugs.    Home Medications:  Tiotropium Bromide Monohydrate, albuterol, albuterol sulfate HFA, atorvastatin, benzonatate, budesonide-formoterol, doxycycline, escitalopram, hydrOXYzine, hydroCHLOROthiazide, mirtazapine, multivitamin with minerals, and predniSONE    Allergies:  Allergies   Allergen Reactions    Sulfamethoxazole Nausea And Vomiting    Trimethoprim Nausea And Vomiting    Epinephrine Palpitations    Penicillins Rash       Objective    Objective     Vitals:   Temp:  [97.9 °F (36.6 °C)-99 °F (37.2 °C)] 99 °F (37.2 °C)  Heart Rate:  [62-77] 77  Resp:  [18] 18  BP: (110-126)/(61-93) 125/93  Flow (L/min):  [2] 2    Physical Exam:  Vital Signs Reviewed   General:  WDWN, Alert, NAD.    HEENT:  PERRL, EOMI.  OP, nares clear, no sinus tenderness  Neck:  Supple, no JVD, no thyromegaly  Lymph: no axillary, cervical, supraclavicular lymphadenopathy noted bilaterally  Chest:  good aeration, clear to auscultation bilaterally, no work of breathing noted on room air  CV: RRR, no MGR, pulses 2+, equal.  Abd:  Soft, NT, ND, + BS  EXT:  no clubbing, no cyanosis, no edema, no joint tenderness  Neuro:  A&Ox3, CN grossly intact, no focal deficits.  Skin: No rashes or lesions noted      Result Review    Result Review:  I have personally reviewed the results from the time of this admission to 1/30/2024 15:42 EST and agree with these findings:  []  Laboratory  []  Microbiology  []  Radiology  []  EKG/Telemetry   []  Cardiology/Vascular   []  Pathology  []  Old records  []  Other:    Most notable findings include:   -     Assessment & Plan   Assessment / Plan     Active Hospital  Problems:  Active Hospital Problems    Diagnosis     **COPD exacerbation     Severe malnutrition        Impression:  COPD exacerbation  Abnormal chest CT  Acute hypoxic respiratory failure requiring supplemental oxygen  History hypertension  Chronic tobacco use, quit 7 days ago    Plan:  Wean to room air.  6-minute walk test prior to discharge  Continue nebs and bronchopulmonary hygiene  Continue oral prednisone for total 5 days  Continue doxycycline for 5 days total  Will get patient seen and COPD/pulmonary clinic 1 to 2 weeks on discharge  On discharge please have patient on ICS/LABA/LAMA i.e. Symbicort and Spiriva with as needed [place]  Encouraged tobacco cessation    DVT prophylaxis:  Medical DVT prophylaxis orders are present.         Code Status and Medical Interventions:   Ordered at: 01/28/24 6374     Level Of Support Discussed With:    Patient     Code Status (Patient has no pulse and is not breathing):    CPR (Attempt to Resuscitate)     Medical Interventions (Patient has pulse or is breathing):    Full Support          Labs, imaging, notes, and medications personally reviewed.    Thank you for involving me in the care of this patient.    Electronically signed by Bety Carter MD, 01/30/24, 3:42 PM EST.

## 2024-01-30 NOTE — SIGNIFICANT NOTE
01/30/24 1115   Coping/Psychosocial   Observed Emotional State calm;cooperative   Verbalized Emotional State relief;hopefulness   Trust Relationship/Rapport empathic listening provided   Involvement in Care interacting with patient   Additional Documentation Spiritual Care (Group)   Spiritual Care   Use of Spiritual Resources non-Confucianist use of spiritual care   Spiritual Care Source  initiative   Spiritual Care Follow-Up follow-up, none required as presently assessed   Response to Spiritual Care receptive of support   Spiritual Care Interventions supportive conversation provided   Spiritual Care Visit Type initial   Receptivity to Spiritual Care visit welcomed

## 2024-01-30 NOTE — PLAN OF CARE
Goal Outcome Evaluation:  Plan of Care Reviewed With: patient        Progress: improving  Outcome Evaluation: Patient presents with no physical limitations that impede her ability to return home independently.  She was encouraged to continue ambulating as tolerated while here at the hospital.  She will be discharged from physical therapy caseload.      Anticipated Discharge Disposition (PT): home

## 2024-01-30 NOTE — DISCHARGE SUMMARY
[facility]         HOSPITALIST  DISCHARGE SUMMARY    Patient Name: Gloria Sunshine  : 1960  MRN: [id number]    Date of Admission: 2024  Date of Discharge:  2024    Primary Care Physician: Anais May APRN    Consults       Date and Time Order Name Status Description    2024 11:59 AM Inpatient Pulmonology Consult Completed     2024  6:20 PM Inpatient Hospitalist Consult              Active and Resolved Hospital Problems:  Active Hospital Problems    Diagnosis POA    **COPD exacerbation [J44.1] Yes    Severe malnutrition [E43] Yes      Resolved Hospital Problems   No resolved problems to display.       Hospital Course     Hospital Course:  Gloria Sunshine is a 63 y.o. female medical history of dyslipidemia, hypertension, tobacco abuse, and COPD who presents emergency department with shortness of breath.      Patient was admitted for COPD exacerbation most likely secondary to continued tobacco abuse.  She has had multiple COPD exacerbations requiring hospitalization over the past couple of months. CT of the chest shows no PE or pulmonary nodules.  Patient was initiated on steroids, nebulizers, and doxycycline.  Supplemental O2 was titrated from initially requiring 2 L to stable on room air.  Pulmonology was consulted and evaluated the patient.    Patient to follow-up with COPD clinic/pulmonology for formal PFT testing.  She was discharged on Spiriva and Symbicort with as needed albuterol nebulizers.  Encouraged tobacco cessation.  She reports that she has lost 20 pounds since the summer, recently started on mirtazapine by her PCP.  She had no pulmonary nodules on CT scan, recommended her to have a follow-up yearly low-dose CT for lung cancer screening.  She reports a normal mammo within the last year and is scheduled to get one soon, encouraged her to update her Pap smear to complete her cancer screenings.    Patient discharged in stable  condition.    DISCHARGE Follow Up Recommendations for labs and diagnostics: Follow-up with PCP in 1 week.  Follow-up with pulmonology/COPD clinic in 2 to 3 weeks.  Recommend mammogram and Pap smear given recent weight loss.      Day of Discharge     Vital Signs:  Temp:  [97.9 °F (36.6 °C)-99 °F (37.2 °C)] 99 °F (37.2 °C)  Heart Rate:  [62-77] 77  Resp:  [18] 18  BP: (110-125)/(61-93) 125/93  Flow (L/min):  [2] 2    Physical Exam:   Gen: NAD, Alert and Oriented  Cards: RRR, no murmur   Pulm: CTA b/l, no wheezing  Abd: soft, nondistended  Extremities: no pitting edema      Discharge Details        Discharge Medications        New Medications        Instructions Start Date   benzonatate 100 MG capsule  Commonly known as: TESSALON   100 mg, Oral, 3 Times Daily PRN      doxycycline 100 MG capsule  Commonly known as: MONODOX   100 mg, Oral, Every 12 Hours Scheduled      predniSONE 20 MG tablet  Commonly known as: DELTASONE   40 mg, Oral, Daily With Breakfast   Start Date: January 31, 2024     Spiriva Respimat 1.25 MCG/ACT aerosol solution inhaler  Generic drug: Tiotropium Bromide Monohydrate   2 puffs, Inhalation, Daily - RT             Continue These Medications        Instructions Start Date   albuterol sulfate  (90 Base) MCG/ACT inhaler  Commonly known as: PROVENTIL HFA;VENTOLIN HFA;PROAIR HFA   Inhale 2 puffs into the lungs every 4 (four) hours as needed for Shortness of Air.      albuterol (2.5 MG/3ML) 0.083% nebulizer solution  Commonly known as: PROVENTIL   2.5 mg, Nebulization, Every 4 Hours PRN      atorvastatin 20 MG tablet  Commonly known as: LIPITOR   Take 1 tablet by mouth nightly.      budesonide-formoterol 80-4.5 MCG/ACT inhaler  Commonly known as: SYMBICORT   2 puffs, Inhalation, 2 Times Daily      escitalopram 5 MG tablet  Commonly known as: LEXAPRO   Take 1 tablet by mouth daily.      hydroCHLOROthiazide 25 MG tablet  Commonly known as: HYDRODIURIL   Take 1 tablet by mouth daily.      hydrOXYzine  25 MG tablet  Commonly known as: ATARAX   Take 1 tablet by mouth every 8 (eight) hours as needed for Anxiety.      mirtazapine 7.5 MG tablet  Commonly known as: REMERON   Take 1 tablet by mouth nightly.      multivitamin with minerals tablet tablet   1 tablet, Oral, Daily               Allergies   Allergen Reactions    Sulfamethoxazole Nausea And Vomiting    Trimethoprim Nausea And Vomiting    Epinephrine Palpitations    Penicillins Rash       Discharge Disposition:  Home or Self Care    Diet:  Hospital:  Diet Order   Procedures    Diet: Regular/House Diet; Texture: Regular Texture (IDDSI 7); Fluid Consistency: Thin (IDDSI 0)       Discharge Activity:       CODE STATUS:  Code Status and Medical Interventions:   Ordered at: 01/28/24 3810     Level Of Support Discussed With:    Patient     Code Status (Patient has no pulse and is not breathing):    CPR (Attempt to Resuscitate)     Medical Interventions (Patient has pulse or is breathing):    Full Support         Future Appointments   Date Time Provider Department Center   2/8/2024 11:30 AM Erica Freeman APRN [facility] PCC COPD [facility]   2/9/2024  2:30 PM CR[facility]ENC[facility] DOTTIE CHON 2 BH [facility] ETWMM [facility]   4/10/2024  1:15 PM Hema Beck APRN [facility] OBG WTPT [facility]   7/16/2024  9:45 AM Yaya Dover MD [facility] CD [place] [facility]       Additional Instructions for the Follow-ups that You Need to Schedule       Discharge Follow-up with PCP   As directed       Currently Documented PCP:    Anais May APRN    PCP Phone Number:    [phone]     Follow Up Details: one week        Discharge Follow-up with Specified Provider: Dr. Carter; 2 Weeks   As directed      To: Dr. Carter   Follow Up: 2 Weeks       Additional information on Labs and Follow-ups:    Follow up With ELENA Jackson on Tuesday Feb 6 @1:00   Follow up with Dr Carter with [facility] Maldonado on Tuesday Feb 13 @1:30           Pertinent  and/or Most Recent Results         LAB RESULTS:      Lab 01/30/24  0501 01/29/24  0539 01/28/24  1600   WBC  13.60* 7.55 10.01   HEMOGLOBIN 11.0* 11.9* 14.8   HEMATOCRIT 33.6* 35.7 44.0   PLATELETS 314 337 421   NEUTROS ABS  --  5.81 4.32   IMMATURE GRANS (ABS)  --  0.02 0.03   LYMPHS ABS  --  1.30 3.72*   MONOS ABS  --  0.40 0.95*   EOS ABS  --  0.01 0.89*   MCV 89.1 87.1 87.8   PROCALCITONIN  --   --  <0.02         Lab 01/30/24  0501 01/29/24  0539 01/28/24  1600   SODIUM 139 138 140   POTASSIUM 3.5 4.0 2.9*   CHLORIDE 105 100 97*   CO2 24.9 25.1 28.4   ANION GAP 9.1 12.9 14.6   BUN 11 14 9   CREATININE 0.66 0.74 0.73   EGFR 99.3 91.6 93.1   GLUCOSE 131* 182* 102*   CALCIUM 8.9 9.7 10.6*   MAGNESIUM 1.9 1.9 2.1   TSH  --  0.566  --          Lab 01/29/24  0539 01/28/24  1600   TOTAL PROTEIN 6.3 8.1   ALBUMIN 4.1 5.1   GLOBULIN 2.2 3.0   ALT (SGPT) 15 16   AST (SGOT) 18 24   BILIRUBIN 0.2 0.3   ALK PHOS 61 75         Lab 01/28/24  1600   PROBNP 66.5   HSTROP T 6                 Brief Urine Lab Results       None          Microbiology Results (last 10 days)       Procedure Component Value - Date/Time    S. Pneumo Ag Urine or CSF - Urine, Urine, Clean Catch [[id number]]  (Normal) Collected: 01/29/24 0642    Lab Status: Final result Specimen: Urine, Clean Catch Updated: 01/29/24 0719     Strep Pneumo Ag Negative    Legionella Antigen, Urine - Urine, Urine, Clean Catch [[id number]]  (Normal) Collected: 01/29/24 0642    Lab Status: Final result Specimen: Urine, Clean Catch Updated: 01/29/24 0719     LEGIONELLA ANTIGEN, URINE Negative    COVID PRE-OP / PRE-PROCEDURE SCREENING ORDER (NO ISOLATION) - Swab, Nasopharynx [[id number]]  (Normal) Collected: 01/28/24 1557    Lab Status: Final result Specimen: Swab from Nasopharynx Updated: 01/28/24 8166    Narrative:      The following orders were created for panel order COVID PRE-OP / PRE-PROCEDURE SCREENING ORDER (NO ISOLATION) - Swab, Nasopharynx.  Procedure                               Abnormality         Status                     ---------                               -----------          ------                     COVID-19, FLU A/B, RSV P...[[id number]]  Normal              Final result                 Please view results for these tests on the individual orders.    COVID-19, FLU A/B, RSV PCR 1 HR TAT - Swab, Nasopharynx [[id number]]  (Normal) Collected: 01/28/24 1559    Lab Status: Final result Specimen: Swab from Nasopharynx Updated: 01/28/24 1646     COVID19 Not Detected     Influenza A PCR Not Detected     Influenza B PCR Not Detected     RSV, PCR Not Detected    Narrative:      Fact sheet for providers: https://www.fda.gov/media/180945/download    Fact sheet for patients: https://www.fda.gov/media/873612/download    Test performed by PCR.            CT Chest With Contrast Diagnostic    Result Date: 1/28/2024    CT scan of the chest with IV contrast demonstrating no findings of PE.  Emphysema.     SUJIT JENKINS MD       Electronically Signed and Approved By: SUJIT JENKINS MD on 1/28/2024 at 18:08             XR Chest 1 View    Result Date: 1/28/2024   No active disease is seen.       SUJIT JENKINS MD       Electronically Signed and Approved By: SUJIT JENKINS MD on 1/28/2024 at 16:22                           Time spent on Discharge including face to face service:  >30 minutes    Electronically signed by Lucille Lugo DO, 01/30/24, 6:33 PM EST.

## 2024-01-30 NOTE — PROCEDURES
Walking Oximetry Progress Note      Patient Name:  Gloria Sunshine  YOB: 1960  Date of Procedure: 01/30/24              ROOM AIR BASELINE   SpO2% 95   Heart Rate 86     EXERCISE ON ROOM AIR SpO2% EXERCISE ON O2 LPM SpO2%   1 MINUTE 91% 1 MINUTE PD 1 93%   2 MINUTES 87% 2 MINUTES PD 1 92%   3 MINUTES  3 MINUTES PD 1 93%   4 MINUTES  4 MINUTES PD 1 92%   5 MINUTES  5 MINUTES PD 1 93%   6 MINUTES  6 MINUTES PD 1 92 %              Time to Recovery  1 MINUTE   SpO2% Post Exercise  94% on ra.    HR Post Exercise  67     Comments:           Electronically signed by Izzy Alcantar CRT, 01/30/24, 1:56 PM EST.  95

## 2024-01-30 NOTE — DISCHARGE INSTR - LAB
Follow up With ELENA Jackson on Tuesday Feb 6 @1:00   Follow up with Dr Carter with [facility] Maldonado on Tuesday Feb 13 @1:30

## 2024-01-30 NOTE — THERAPY EVALUATION
Acute Care - Physical Therapy Initial Evaluation   [facility]     Patient Name: Gloria Sunshine  : 1960  MRN: [id number]  Today's Date: 2024      Visit Dx:     ICD-10-CM ICD-9-CM   1. Dyspnea on exertion  R06.09 786.09   2. Hypoxia  R09.02 799.02   3. COPD exacerbation  J44.1 491.21   4. Decreased activities of daily living (ADL)  Z78.9 V49.89   5. Chronic obstructive pulmonary disease with acute exacerbation  J44.1 491.21   6. Shortness of breath  R06.02 786.05   7. Difficulty walking  R26.2 719.7     Patient Active Problem List   Diagnosis    Acute respiratory failure with hypoxia    COPD exacerbation    Anxiety    Depression    Hypertension    Hyperlipidemia LDL goal <100    Severe malnutrition     Past Medical History:   Diagnosis Date    Elevated cholesterol     Hyperlipidemia     Hypertension      Past Surgical History:   Procedure Laterality Date    CYST REMOVAL Left     breast    TUBAL ABDOMINAL LIGATION       PT Assessment (last 12 hours)       PT Evaluation and Treatment       Row Name 24 1400          Physical Therapy Time and Intention    Subjective Information no complaints  -CS     Document Type evaluation  -CS     Mode of Treatment individual therapy;physical therapy  -CS     Patient Effort good  -CS     Symptoms Noted During/After Treatment none  -CS       Row Name 24 1400          General Information    Patient Profile Reviewed yes  -CS     Patient Observations alert;cooperative;agree to therapy  -CS     Prior Level of Function independent:;all household mobility;gait;transfer;bed mobility;ADL's;community mobility;driving;work  -CS     Equipment Currently Used at Home none  has a walk-in shower with seat available if needed  -CS     Existing Precautions/Restrictions no known precautions/restrictions  -CS     Barriers to Rehab none identified  -CS       Row Name 24 1400          Living Environment    Current Living Arrangements home  -CS     Home Accessibility  stairs to enter home;stairs within home  -CS     People in Home spouse  -CS     Primary Care Provided by self  -CS       Row Name 01/30/24 1400          Home Main Entrance    Number of Stairs, Main Entrance none  -CS       Row Name 01/30/24 1400          Stairs Within Home, Primary    Number of Stairs, Within Home, Primary none  -CS       Row Name 01/30/24 1400          Pain    Pretreatment Pain Rating 0/10 - no pain  -CS     Posttreatment Pain Rating 0/10 - no pain  -CS       Row Name 01/30/24 1400          Cognition    Orientation Status (Cognition) oriented x 3  -CS       Row Name 01/30/24 1400          Range of Motion Comprehensive    General Range of Motion no range of motion deficits identified  -CS       Row Name 01/30/24 1400          Strength Comprehensive (MMT)    General Manual Muscle Testing (MMT) Assessment no strength deficits identified  -CS       Row Name 01/30/24 1400          Bed Mobility    Bed Mobility bed mobility (all) activities  -CS     All Activities, [place] (Bed Mobility) independent  -CS       Row Name 01/30/24 1400          Transfers    Comment, (Transfers) Patient able to complete all functional transfers independently with no assistive device.  -CS       Row Name 01/30/24 1400          Gait/Stairs (Locomotion)    Gait/Stairs Locomotion gait/ambulation independence  -CS     [place] Level (Gait) independent  -CS     Assistive Device (Gait) other (see comments)  no AD  -CS     Distance in Feet (Gait) 25  -CS     Pattern (Gait) step-through  -CS       Row Name 01/30/24 1400          Safety Issues, Functional Mobility    Impairments Affecting Function (Mobility) --  None present  -CS       Row Name 01/30/24 1400          Balance    Balance Assessment standing dynamic balance  -CS     Dynamic Standing Balance independent  -CS     Position/Device Used, Standing Balance unsupported  -CS       Row Name 01/30/24 1400          Plan of Care Review    Plan of Care Reviewed With patient   -CS     Progress improving  -CS     Outcome Evaluation Patient presents with no physical limitations that impede her ability to return home independently.  She was encouraged to continue ambulating as tolerated while here at the hospital.  She will be discharged from physical therapy caseload.  -CS       Row Name 01/30/24 1400          Positioning and Restraints    Pre-Treatment Position in bed  -CS     Post Treatment Position bed  -CS     In Bed sitting;call light within reach  -CS       Row Name 01/30/24 1400          Therapy Assessment/Plan (PT)    Criteria for Skilled Interventions Met (PT) no problems identified which require skilled intervention  -CS     Therapy Frequency (PT) evaluation only  -CS       Row Name 01/30/24 1400          PT Evaluation Complexity    History, PT Evaluation Complexity no personal factors and/or comorbidities  -CS     Examination of Body Systems (PT Eval Complexity) total of 4 or more elements  -CS     Clinical Presentation (PT Evaluation Complexity) stable  -CS     Clinical Decision Making (PT Evaluation Complexity) low complexity  -CS     Overall Complexity (PT Evaluation Complexity) low complexity  -CS       Row Name 01/30/24 1400          Therapy Plan Review/Discharge Plan (PT)    Therapy Plan Review (PT) evaluation/treatment results reviewed;patient  -CS       Row Name 01/30/24 1400          Physical Therapy Goals    Problem Specific Goal Selection (PT) problem specific goal 1, PT  -CS       Row Name 01/30/24 1400          Problem Specific Goal 1 (PT)    Problem Specific Goal 1 (PT) Complete physical therapy evaluation  -CS     Time Frame (Problem Specific Goal 1, PT) by discharge  -CS     Progress/Outcome (Problem Specific Goal 1, PT) goal met  -CS               User Key  (r) = Recorded By, (t) = Taken By, (c) = Cosigned By      Initials Name Provider Type    Mikaela Davis, PT Physical Therapist                      PT Recommendation and Plan  Anticipated Discharge  Disposition (PT): home  Therapy Frequency (PT): evaluation only  Plan of Care Reviewed With: patient  Progress: improving  Outcome Evaluation: Patient presents with no physical limitations that impede her ability to return home independently.  She was encouraged to continue ambulating as tolerated while here at the hospital.  She will be discharged from physical therapy caseload.   Outcome Measures       Row Name 01/30/24 1400             How much help from another person do you currently need...    Turning from your back to your side while in flat bed without using bedrails? 4  -CS      Moving from lying on back to sitting on the side of a flat bed without bedrails? 4  -CS      Moving to and from a bed to a chair (including a wheelchair)? 4  -CS      Standing up from a chair using your arms (e.g., wheelchair, bedside chair)? 4  -CS      Climbing 3-5 steps with a railing? 4  -CS      To walk in hospital room? 4  -CS      AM-PAC 6 Clicks Score (PT) 24  -CS      Highest Level of Mobility Goal 8 --> Walked 250 feet or more  -CS         Functional Assessment    Outcome Measure Options AM-PAC 6 Clicks Basic Mobility (PT)  -CS                User Key  (r) = Recorded By, (t) = Taken By, (c) = Cosigned By      Initials Name Provider Type    Mikaela Davis PT Physical Therapist                     Time Calculation:    PT Charges       Row Name 01/30/24 1400             Time Calculation    PT Received On 01/30/24  -CS         Untimed Charges    PT Eval/Re-eval Minutes 32  -CS         Total Minutes    Untimed Charges Total Minutes 32  -CS       Total Minutes 32  -CS                User Key  (r) = Recorded By, (t) = Taken By, (c) = Cosigned By      Initials Name Provider Type    Mikaela Davis PT Physical Therapist                  Therapy Charges for Today       Code Description Service Date Service Provider Modifiers Qty    [id number] HC PT EVAL LOW COMPLEXITY 3 1/30/2024 Mikaela Joel PT GP 1            PT  G-Codes  Outcome Measure Options: AM-PAC 6 Clicks Basic Mobility (PT)  AM-PAC 6 Clicks Score (PT): 24  AM-PAC 6 Clicks Score (OT): 24    Mikaela Joel PT  1/30/2024

## 2024-01-30 NOTE — THERAPY EVALUATION
Patient Name: Gloria Sunshine  : 1960    MRN: [id number]                              Today's Date: 2024       Admit Date: 2024    Visit Dx:     ICD-10-CM ICD-9-CM   1. Dyspnea on exertion  R06.09 786.09   2. Hypoxia  R09.02 799.02   3. COPD exacerbation  J44.1 491.21   4. Decreased activities of daily living (ADL)  Z78.9 V49.89     Patient Active Problem List   Diagnosis    Acute respiratory failure with hypoxia    COPD exacerbation    Anxiety    Depression    Hypertension    Hyperlipidemia LDL goal <100     Past Medical History:   Diagnosis Date    Elevated cholesterol     Hyperlipidemia     Hypertension      Past Surgical History:   Procedure Laterality Date    CYST REMOVAL Left     breast    TUBAL ABDOMINAL LIGATION        General Information       Row Name 24          OT Time and Intention    Document Type evaluation  -AV     Mode of Treatment individual therapy;occupational therapy  -AV       Row Name 24          General Information    Patient Profile Reviewed yes  -AV     Prior Level of Function independent:;ADL's;home management;transfer;all household mobility;community mobility  stands at sink to groom. uses walk-in shower with seat. ambulates without assistive device. works 12 hr shifts/ 3 days per week. no home O2.  -AV     Existing Precautions/Restrictions no known precautions/restrictions  -AV     Barriers to Rehab none identified  -AV       Row Name 24          Occupational Profile    Reason for Services/Referral (Occupational Profile) Patient is a 63 year old female admitted to [facility] on 24 with shortness of air. She is currently on 4NT/ room air (sats running low 90s this am). OT consulted to evaluate for ADL needs. No previous OT services for current condition.  -AV       Row Name 24          Living Environment    People in Home spouse  -AV       Row Name 24          Home Main Entrance    Number of  Stairs, Main Entrance none  -AV       Row Name 01/30/24 0922          Stairs Within Home, Primary    Number of Stairs, Within Home, Primary none  -AV       Row Name 01/30/24 0922          Cognition    Orientation Status (Cognition) --  alert, pleasant and cooperative. able to retain information and follow commands.  -AV               User Key  (r) = Recorded By, (t) = Taken By, (c) = Cosigned By      Initials Name Provider Type    Gurjit Lane OT Occupational Therapist                     Mobility/ADL's       Row Name 01/30/24 0925          Bed Mobility    Comment, (Bed Mobility) independent  -AV       Row Name 01/30/24 0925          Transfers    Comment, (Transfers) independent  -AV       Row Name 01/30/24 0925          Activities of Daily Living    BADL Assessment/Intervention --  Independent all basic ADLs. Ambulates to bathroom for toileting vs BSC use.  -AV               User Key  (r) = Recorded By, (t) = Taken By, (c) = Cosigned By      Initials Name Provider Type    Gurjit Lane OT Occupational Therapist                   Obj/Interventions       Row Name 01/30/24 0926          Sensory Assessment (Somatosensory)    Sensory Assessment (Somatosensory) UE sensation intact  -AV       Row Name 01/30/24 0926          Vision Assessment/Intervention    Visual Impairment/Limitations WFL  -AV       Row Name 01/30/24 0926          Range of Motion Comprehensive    General Range of Motion bilateral upper extremity ROM WFL  -AV     Comment, General Range of Motion AROM  -AV       Row Name 01/30/24 0926          Strength Comprehensive (MMT)    Comment, General Manual Muscle Testing (MMT) Assessment 4+/5 bilateral upper extremities  -AV       Row Name 01/30/24 0926          Motor Skills    Motor Skills coordination;functional endurance  -AV     Coordination WFL  right dominant  -AV     Functional Endurance WFL basic ADLs  -AV       Row Name 01/30/24 0926          Balance    Comment, Balance independent  -AV                User Key  (r) = Recorded By, (t) = Taken By, (c) = Cosigned By      Initials Name Provider Type    Gurjit Lane, OT Occupational Therapist                   Goals/Plan    No documentation.                  Clinical Impression       Row Name 01/30/24 0927          Pain Assessment    Additional Documentation Pain Scale: FACES Pre/Post-Treatment (Group)  -AV       Row Name 01/30/24 0927          Pain Scale: FACES Pre/Post-Treatment    Pain: FACES Scale, Pretreatment 0-->no hurt  -AV     Posttreatment Pain Rating 0-->no hurt  -AV       Row Name 01/30/24 0927          Plan of Care Review    Plan of Care Reviewed With patient  -AV     Progress no change  first session  -AV     Outcome Evaluation Skilled OT services not recommended at this time as patient is independent with basic ADL/ transfers. She has been on room air this am with sats running low-mid 90s per EMR. Will discharge OT at this time with patient in agreement. May benefit from outpatient pulmonary rehab.  -AV       Row Name 01/30/24 0927          Therapy Assessment/Plan (OT)    Patient/Family Therapy Goal Statement (OT) NA  -AV     Rehab Potential (OT) --  NA  -AV     Criteria for Skilled Therapeutic Interventions Met (OT) does not meet criteria for skilled intervention  -AV     Therapy Frequency (OT) evaluation only  -AV       Row Name 01/30/24 0927          Therapy Plan Review/Discharge Plan (OT)    Anticipated Discharge Disposition (OT) home  -AV       Row Name 01/30/24 0927          Vital Signs    O2 Delivery Pre Treatment room air  -AV     O2 Delivery Intra Treatment room air  -AV     O2 Delivery Post Treatment room air  -AV       Row Name 01/30/24 0927          Positioning and Restraints    Pre-Treatment Position in bed  -AV     Post Treatment Position bed  -AV     In Bed call light within reach;encouraged to call for assist  -AV               User Key  (r) = Recorded By, (t) = Taken By, (c) = Cosigned By      Initials Name Provider Type     Gurjit Lane OT Occupational Therapist                   Outcome Measures       Row Name 01/30/24 0930          How much help from another is currently needed...    Putting on and taking off regular lower body clothing? 4  -AV     Bathing (including washing, rinsing, and drying) 4  -AV     Toileting (which includes using toilet bed pan or urinal) 4  -AV     Putting on and taking off regular upper body clothing 4  -AV     Taking care of personal grooming (such as brushing teeth) 4  -AV     Eating meals 4  -AV     AM-PAC 6 Clicks Score (OT) 24  -AV       Row Name 01/30/24 0930          Functional Assessment    Outcome Measure Options AM-PAC 6 Clicks Daily Activity (OT);Optimal Instrument  -AV       Row Name 01/30/24 0930          Optimal Instrument    Optimal Instrument Optimal - 3  -AV     Bending/Stooping 1  -AV     Standing 1  -AV     Reaching 1  -AV     From the list, choose the 3 activities you would most like to be able to do without any difficulty Bending/stooping;Standing;Reaching  -AV     Total Score Optimal - 3 3  -AV               User Key  (r) = Recorded By, (t) = Taken By, (c) = Cosigned By      Initials Name Provider Type    Gurjit Lane OT Occupational Therapist                    Occupational Therapy Education       Title: PT OT SLP Therapies (Done)       Topic: Occupational Therapy (Done)       Point: ADL training (Done)       Description:   Instruct learner(s) on proper safety adaptation and remediation techniques during self care or transfers.   Instruct in proper use of assistive devices.                  Learning Progress Summary             Patient Acceptance, E, VU by AV at 1/30/2024 0930    Comment: OT not indicated at this time                                         User Key       Initials Effective Dates Name Provider Type Discipline     06/16/21 -  Gurjit Partida OT Occupational Therapist OT                  OT Recommendation and Plan  Therapy Frequency (OT): evaluation  only  Plan of Care Review  Plan of Care Reviewed With: patient  Progress: no change (first session)  Outcome Evaluation: Skilled OT services not recommended at this time as patient is independent with basic ADL/ transfers. She has been on room air this am with sats running low-mid 90s per EMR. Will discharge OT at this time with patient in agreement. May benefit from outpatient pulmonary rehab.     Time Calculation:   Evaluation Complexity (OT)  Review Occupational Profile/Medical/Therapy History Complexity: expanded/moderate complexity  Assessment, Occupational Performance/Identification of Deficit Complexity: 1-3 performance deficits  Clinical Decision Making Complexity (OT): problem focused assessment/low complexity  Overall Complexity of Evaluation (OT): low complexity     Time Calculation- OT       Row Name 01/30/24 0931             Time Calculation- OT    OT Received On 01/30/24  -AV         Untimed Charges    OT Eval/Re-eval Minutes 32  -AV         Total Minutes    Untimed Charges Total Minutes 32  -AV       Total Minutes 32  -AV                User Key  (r) = Recorded By, (t) = Taken By, (c) = Cosigned By      Initials Name Provider Type    AV Gurjit Partida OT Occupational Therapist                  Therapy Charges for Today       Code Description Service Date Service Provider Modifiers Qty    [id number] HC OT EVAL LOW COMPLEXITY 3 1/30/2024 Gurjit Partida OT GO 1                 Gurjit Partida OT  1/30/2024

## 2024-01-31 NOTE — OUTREACH NOTE
Prep Survey      Flowsheet Row Responses   [facility] facility patient discharged from? [facility]   Is LACE score < 7 ? Yes   Eligibility Not Eligible   What are the reasons patient is not eligible? Other  [lowrisk for readmit]   Does the patient have one of the following disease processes/diagnoses(primary or secondary)? Other   Prep survey completed? Yes            GUILHERME SANTOS - Registered Nurse

## 2024-02-26 NOTE — PROGRESS NOTES
Primary Care Provider  Anais May APRN   Referring Provider  Chilo Wetzel MD      Patient Complaint  Hospital Follow Up Visit (COPD exacerbation/Hypoxia/Dyspnea on exertion)      Subjective          Gloria Sunshine presents to [facility]      History of Presenting Illness  Gloria Sunshine is a 63 y.o. female with acute hypoxic respiratory failure, likely COPD, peripheral eosinophilia, chest tightness, and tobacco abuse ongoing, here for hospital follow-up in COPD clinic.    Patient was hospitalized at [facility] 1/28/2024 through 1/30/2024 for COPD exacerbation and severe malnutrition.  She presented to the emergency department with worsening shortness of breath.  Of note, patient had had multiple COPD exacerbations requiring hospitalization in the last couple of months.  Chest CT showed no pulmonary embolism or acute abnormalities.  Patient was started on IV steroids, scheduled nebulizers, and doxycycline.  She required 2 L supplemental oxygen but was able to be weaned back off to her baseline room air at rest.  6-minute walk test prior to discharge, O2 saturation dropped to 88%, patient required 1 L pulsed dose to recover.  Patient began to improve clinically with treatment, was instructed to follow-up in COPD clinic for PFTs and further diagnostics.  She was instructed to begin using Symbicort and Spiriva as well as albuterol and DuoNeb treatments as needed.  Patient had reportedly lost 20 pounds in the past 6 months, though no pulmonary nodules were seen on CT scan.  She was discharged home in stable condition with instructions to complete oral antibiotic and prednisone.    Patient states she has been doing better since being discharged from the hospital, she is new to our outpatient clinic.  She reports having completed the doxycycline and prednisone that were prescribed at discharge.  Patient denies any current fevers or  chills.  She has no major respiratory complaints today, does have a cough occasionally productive of a little sputum.  Patient has some anxiety surrounding her breathing, understandable given her multiple hospitalizations in the past year.  Prior to these hospitalizations, patient had never been hospitalized for her breathing.  She moved to [place] 16 years ago.  She does have a dog but has had dogs for years with no allergic issues.  Patient has been using her new Symbicort and Spiriva, but only as needed rather than daily.  She also has DuoNeb treatments treatments to use as needed.  She has been wearing 1 L supplemental oxygen as needed, has had to use it a couple of times at night when she felt tightness in her chest.  She is interested in a portable oxygen concentrator, especially as she has a trip to [place] in a few weeks.  Patient continues to smoke cigarettes but has cut down to 3 per day, 12 pack years.  Patient denies any hemoptysis, swollen lymph nodes, or night sweats.  Overall, patient is doing well and has no additional concerns at this time.  Patient is able to perform ADLs without difficulty.  She works in healthcare, home health.  I have personally reviewed the review of systems, past family, social, medical and surgical histories; and agree with their findings.      Review of Systems    Review of Systems   Constitutional:  Negative for activity change, chills, fatigue, fever, unexpected weight gain and unexpected weight loss.   HENT:  Negative for congestion, ear discharge, ear pain, mouth sores, postnasal drip, rhinorrhea, sinus pressure, sore throat, swollen glands and trouble swallowing.    Eyes:  Negative for blurred vision, pain, discharge, itching and visual disturbance.   Respiratory:  Negative for apnea, cough, chest tightness, shortness of breath, wheezing and stridor.    Cardiovascular:  Negative for chest pain, palpitations and leg swelling.   Gastrointestinal:  Negative for abdominal  distention, abdominal pain, constipation, diarrhea, nausea, vomiting, GERD and indigestion.   Musculoskeletal:  Negative for arthralgias, joint swelling and myalgias.   Skin:  Negative for color change.   Neurological:  Negative for dizziness, weakness, light-headedness and headache.      Sleep: Negative for Excessive daytime sleepiness  Negative for morning headaches  Negative for Snoring      History reviewed. No pertinent family history.     Social History     Socioeconomic History    Marital status:    Tobacco Use    Smoking status: Every Day     Packs/day: 0.25     Years: 46.00     Additional pack years: 0.00     Total pack years: 11.50     Types: Cigarettes    Smokeless tobacco: Never    Tobacco comments:     Down to 3 cigs a day   Vaping Use    Vaping Use: Never used   Substance and Sexual Activity    Alcohol use: Yes     Comment: occasionally    Drug use: Not Currently    Sexual activity: Defer        Past Medical History:   Diagnosis Date    Elevated cholesterol     Hyperlipidemia     Hypertension         Immunization History   Administered Date(s) Administered    COVID-19 (MODERNA) 1st,2nd,3rd Dose Monovalent 03/06/2021, 04/02/2021    COVID-19 (MODERNA) Monovalent Original Booster 12/28/2021    Fluzone (or Fluarix & Flulaval for VFC) >6mos 10/08/2019, 01/26/2021, 12/26/2022    Pneumococcal Polysaccharide (PPSV23) 09/22/2020    Shingrix 04/06/2021    Td (TDVAX) 07/05/1996, 06/18/2001    Tdap 10/07/2020       Allergies   Allergen Reactions    Influenza Vaccines Swelling     Patient got gouter on neck           Current Outpatient Medications:     albuterol (PROVENTIL) (2.5 MG/3ML) 0.083% nebulizer solution, Take 2.5 mg by nebulization Every 4 (Four) Hours As Needed for Wheezing or Shortness of Air., Disp: 180 mL, Rfl: 0    albuterol sulfate  (90 Base) MCG/ACT inhaler, Inhale 2 puffs into the lungs every 4 (four) hours as needed for Shortness of Air., Disp: 18 g, Rfl: 11    atorvastatin (LIPITOR)  "20 MG tablet, Take 1 tablet by mouth nightly., Disp: 90 tablet, Rfl: 1    budesonide-formoterol (SYMBICORT) 80-4.5 MCG/ACT inhaler, Inhale 2 puffs 2 (Two) Times a Day., Disp: 1 each, Rfl: 11    escitalopram (LEXAPRO) 5 MG tablet, Take 1 tablet by mouth daily., Disp: 30 tablet, Rfl: 5    hydroCHLOROthiazide 25 MG tablet, Take 1 tablet by mouth daily., Disp: 90 tablet, Rfl: 1    hydrOXYzine (ATARAX) 25 MG tablet, Take 1 tablet by mouth every 8 (eight) hours as needed for Anxiety., Disp: 180 tablet, Rfl: 1    mirtazapine (REMERON) 7.5 MG tablet, Take 1 tablet by mouth nightly., Disp: 90 tablet, Rfl: 1    multivitamin with minerals tablet tablet, Take 1 tablet by mouth Daily., Disp: , Rfl:     Tiotropium Bromide Monohydrate (Spiriva Respimat) 1.25 MCG/ACT aerosol solution inhaler, Inhale 2 puffs Daily., Disp: 1 each, Rfl: 11     Objective     Vital Signs:   /81 (BP Location: Left arm, Patient Position: Sitting, Cuff Size: Adult)   Pulse 72   Temp 98.5 °F (36.9 °C) (Oral)   Resp 16   Ht 180.3 cm (71\")   Wt 55.2 kg (121 lb 9.6 oz)   SpO2 97% Comment: room air has oxygen prn  BMI 16.96 kg/m²     Physical Exam  Constitutional:       General: She is not in acute distress.     Appearance: Normal appearance. She is normal weight. She is not ill-appearing.   HENT:      Right Ear: Tympanic membrane and ear canal normal.      Left Ear: Tympanic membrane and ear canal normal.      Nose: Nose normal.      Mouth/Throat:      Mouth: Mucous membranes are moist.      Pharynx: Oropharynx is clear.   Eyes:      Extraocular Movements: Extraocular movements intact.      Conjunctiva/sclera: Conjunctivae normal.      Pupils: Pupils are equal, round, and reactive to light.   Cardiovascular:      Rate and Rhythm: Normal rate and regular rhythm.      Pulses: Normal pulses.      Heart sounds: Normal heart sounds.   Pulmonary:      Effort: Pulmonary effort is normal. No respiratory distress.      Breath sounds: Normal breath sounds. " No stridor. No wheezing, rhonchi or rales.   Abdominal:      General: Bowel sounds are normal.      Palpations: Abdomen is soft.   Musculoskeletal:         General: No swelling. Normal range of motion.      Cervical back: Normal range of motion and neck supple.      Right lower leg: No edema.      Left lower leg: No edema.   Skin:     General: Skin is warm and dry.   Neurological:      General: No focal deficit present.      Mental Status: She is alert and oriented to person, place, and time.      Motor: No weakness.   Psychiatric:         Mood and Affect: Mood normal.         Behavior: Behavior normal.       Result Review :   I have personally reviewed patient's labs and images.  I also reviewed Dr. Carter's hospital consult note 1/30/2024 and Dr. Lugo's discharge summary 1/30/2024.            Diagnoses and all orders for this visit:    1. Acute respiratory failure with hypoxia (Primary)  -     Oxygen Therapy    2. COPD exacerbation  -     Tiotropium Bromide Monohydrate (Spiriva Respimat) 1.25 MCG/ACT aerosol solution inhaler; Inhale 2 puffs Daily.  Dispense: 1 each; Refill: 11  -     budesonide-formoterol (SYMBICORT) 80-4.5 MCG/ACT inhaler; Inhale 2 puffs 2 (Two) Times a Day.  Dispense: 1 each; Refill: 11    3. Dyspnea, unspecified type  -     Complete PFT - Pre & Post Bronchodilator; Future  -     Alpha - 1 - Antitrypsin; Future  -     Tiotropium Bromide Monohydrate (Spiriva Respimat) 1.25 MCG/ACT aerosol solution inhaler; Inhale 2 puffs Daily.  Dispense: 1 each; Refill: 11  -     budesonide-formoterol (SYMBICORT) 80-4.5 MCG/ACT inhaler; Inhale 2 puffs 2 (Two) Times a Day.  Dispense: 1 each; Refill: 11  -     albuterol sulfate  (90 Base) MCG/ACT inhaler; Inhale 2 puffs into the lungs every 4 (four) hours as needed for Shortness of Air.  Dispense: 18 g; Refill: 11    4. Peripheral eosinophilia    5. Severe malnutrition    6. Tobacco abuse    7. Encounter for smoking cessation counseling      Impression and  Plan    -CTA 1/28/2024 showed marked emphysema, stable scarring in the left lung base.  No lung nodules evident.  No lymphadenopathy.  Negative for PE.  -Echocardiogram 7/21/2023 EF 51 to 55%, eRVSP mildly elevated 35 to 45 mmHg  -Peripheral eosinophilia 890 on 1/28/2024  -PFTs ordered today  -Alpha-1 antitrypsin genotype and levels ordered  -Continue wearing 1 L supplemental oxygen to keep O2 saturation at or greater than 90%.  I also ordered a portable oxygen concentrator.  -Continue using Symbicort twice daily, reminded patient to rinse mouth after each use.  Instructed patient to use this inhaler every day for maintenance.  -Continue using Spiriva once daily, instructed patient to use this inhaler every day for maintenance  -Continue using DuoNeb treatments as needed, I also ordered an albuterol rescue inhaler for patient to keep in her purse or car to use as needed  -Smoking cessation counseling provided.  I spent 5 minutes today counseling patient on the risks of smoking, including throat cancer, lung cancer, COPD, heart disease and death.  Also discussed the benefits of quitting.  -Follow-up in 1 to 2 months after completion of pulmonary function testing and labs    Smoking status: Reviewed  Vaccination status: Patient reports she is up-to-date with her flu, pneumonia, and Covid vaccines.  Patient is advised to continue to follow CDC recommendations such as social distancing wearing a mask and washing hands for at least 20 seconds.  Medications personally reviewed    Follow Up   No follow-ups on file.  Patient was given instructions and counseling regarding her condition or for health maintenance advice. Please see specific information pulled into the AVS if appropriate.

## 2024-02-29 ENCOUNTER — TELEPHONE (OUTPATIENT)
Dept: PULMONOLOGY | Facility: CLINIC | Age: 64
End: 2024-02-29

## 2024-02-29 ENCOUNTER — OFFICE VISIT (OUTPATIENT)
Dept: PULMONOLOGY | Facility: CLINIC | Age: 64
End: 2024-02-29
Payer: COMMERCIAL

## 2024-02-29 VITALS
DIASTOLIC BLOOD PRESSURE: 81 MMHG | HEIGHT: 71 IN | BODY MASS INDEX: 17.02 KG/M2 | WEIGHT: 121.6 LBS | OXYGEN SATURATION: 97 % | HEART RATE: 72 BPM | RESPIRATION RATE: 16 BRPM | TEMPERATURE: 98.5 F | SYSTOLIC BLOOD PRESSURE: 145 MMHG

## 2024-02-29 DIAGNOSIS — J96.01 ACUTE RESPIRATORY FAILURE WITH HYPOXIA: Primary | ICD-10-CM

## 2024-02-29 DIAGNOSIS — R06.00 DYSPNEA, UNSPECIFIED TYPE: ICD-10-CM

## 2024-02-29 DIAGNOSIS — D72.19 PERIPHERAL EOSINOPHILIA: ICD-10-CM

## 2024-02-29 DIAGNOSIS — Z71.6 ENCOUNTER FOR SMOKING CESSATION COUNSELING: ICD-10-CM

## 2024-02-29 DIAGNOSIS — E43 SEVERE MALNUTRITION: ICD-10-CM

## 2024-02-29 DIAGNOSIS — J44.1 COPD EXACERBATION: ICD-10-CM

## 2024-02-29 DIAGNOSIS — Z72.0 TOBACCO ABUSE: ICD-10-CM

## 2024-02-29 RX ORDER — TIOTROPIUM BROMIDE INHALATION SPRAY 1.56 UG/1
2 SPRAY, METERED RESPIRATORY (INHALATION)
Qty: 1 EACH | Refills: 11 | Status: SHIPPED | OUTPATIENT
Start: 2024-02-29

## 2024-02-29 RX ORDER — ALBUTEROL SULFATE 90 UG/1
AEROSOL, METERED RESPIRATORY (INHALATION)
Qty: 18 G | Refills: 11 | Status: SHIPPED | OUTPATIENT
Start: 2024-02-29

## 2024-02-29 RX ORDER — BUDESONIDE AND FORMOTEROL FUMARATE DIHYDRATE 80; 4.5 UG/1; UG/1
2 AEROSOL RESPIRATORY (INHALATION) 2 TIMES DAILY
Qty: 1 EACH | Refills: 11 | Status: SHIPPED | OUTPATIENT
Start: 2024-02-29

## 2024-02-29 NOTE — TELEPHONE ENCOUNTER
Patient called and said she gave us the wrong company She uses [employer].She is needing her portable oxygen sent into [facility].

## 2024-03-11 ENCOUNTER — TELEPHONE (OUTPATIENT)
Dept: PULMONOLOGY | Facility: CLINIC | Age: 64
End: 2024-03-11
Payer: COMMERCIAL

## 2024-03-11 NOTE — TELEPHONE ENCOUNTER
Patient called and stated her DME company has not received a POC order. Patients last visit was on 02/29. Please advise, thank you.

## 2024-03-11 NOTE — TELEPHONE ENCOUNTER
Called and spoke with pt.  Order was sent to [facility] on 2/29/24.  I have resent the order as they state they didn't receive it.

## 2024-03-23 ENCOUNTER — HOSPITAL ENCOUNTER (INPATIENT)
Facility: HOSPITAL | Age: 64
LOS: 3 days | Discharge: HOME | DRG: 192 | End: 2024-03-26
Attending: EMERGENCY MEDICINE | Admitting: INTERNAL MEDICINE
Payer: COMMERCIAL

## 2024-03-23 ENCOUNTER — APPOINTMENT (OUTPATIENT)
Dept: GENERAL RADIOLOGY | Facility: HOSPITAL | Age: 64
DRG: 192 | End: 2024-03-23
Payer: COMMERCIAL

## 2024-03-23 DIAGNOSIS — R26.2 DIFFICULTY WALKING: ICD-10-CM

## 2024-03-23 DIAGNOSIS — J44.1 COPD WITH ACUTE EXACERBATION: Primary | ICD-10-CM

## 2024-03-23 LAB
ALBUMIN SERPL-MCNC: 4.7 G/DL (ref 3.5–5.2)
ALBUMIN/GLOB SERPL: 1.6 G/DL
ALP SERPL-CCNC: 70 U/L (ref 39–117)
ALT SERPL W P-5'-P-CCNC: 24 U/L (ref 1–33)
ANION GAP SERPL CALCULATED.3IONS-SCNC: 12.5 MMOL/L (ref 5–15)
AST SERPL-CCNC: 27 U/L (ref 1–32)
BASOPHILS # BLD AUTO: 0.08 10*3/MM3 (ref 0–0.2)
BASOPHILS NFR BLD AUTO: 1.1 % (ref 0–1.5)
BILIRUB SERPL-MCNC: 0.3 MG/DL (ref 0–1.2)
BUN SERPL-MCNC: 6 MG/DL (ref 8–23)
BUN/CREAT SERPL: 9.1 (ref 7–25)
CALCIUM SPEC-SCNC: 9.7 MG/DL (ref 8.6–10.5)
CHLORIDE SERPL-SCNC: 99 MMOL/L (ref 98–107)
CO2 SERPL-SCNC: 26.5 MMOL/L (ref 22–29)
CREAT SERPL-MCNC: 0.66 MG/DL (ref 0.57–1)
D-LACTATE SERPL-SCNC: 1.7 MMOL/L (ref 0.5–2)
DEPRECATED RDW RBC AUTO: 45.3 FL (ref 37–54)
EGFRCR SERPLBLD CKD-EPI 2021: 99.3 ML/MIN/1.73
EOSINOPHIL # BLD AUTO: 0.35 10*3/MM3 (ref 0–0.4)
EOSINOPHIL NFR BLD AUTO: 4.7 % (ref 0.3–6.2)
ERYTHROCYTE [DISTWIDTH] IN BLOOD BY AUTOMATED COUNT: 14.1 % (ref 12.3–15.4)
FLUAV SUBTYP SPEC NAA+PROBE: NOT DETECTED
FLUBV RNA ISLT QL NAA+PROBE: NOT DETECTED
GLOBULIN UR ELPH-MCNC: 3 GM/DL
GLUCOSE SERPL-MCNC: 112 MG/DL (ref 65–99)
HCT VFR BLD AUTO: 41.6 % (ref 34–46.6)
HGB BLD-MCNC: 13.8 G/DL (ref 12–15.9)
HOLD SPECIMEN: NORMAL
IMM GRANULOCYTES # BLD AUTO: 0.01 10*3/MM3 (ref 0–0.05)
IMM GRANULOCYTES NFR BLD AUTO: 0.1 % (ref 0–0.5)
LYMPHOCYTES # BLD AUTO: 1.69 10*3/MM3 (ref 0.7–3.1)
LYMPHOCYTES NFR BLD AUTO: 22.8 % (ref 19.6–45.3)
MCH RBC QN AUTO: 28.9 PG (ref 26.6–33)
MCHC RBC AUTO-ENTMCNC: 33.2 G/DL (ref 31.5–35.7)
MCV RBC AUTO: 87.2 FL (ref 79–97)
MONOCYTES # BLD AUTO: 0.65 10*3/MM3 (ref 0.1–0.9)
MONOCYTES NFR BLD AUTO: 8.8 % (ref 5–12)
NEUTROPHILS NFR BLD AUTO: 4.63 10*3/MM3 (ref 1.7–7)
NEUTROPHILS NFR BLD AUTO: 62.5 % (ref 42.7–76)
NRBC BLD AUTO-RTO: 0 /100 WBC (ref 0–0.2)
NT-PROBNP SERPL-MCNC: 128.2 PG/ML (ref 0–900)
PLATELET # BLD AUTO: 424 10*3/MM3 (ref 140–450)
PMV BLD AUTO: 8.6 FL (ref 6–12)
POTASSIUM SERPL-SCNC: 4.6 MMOL/L (ref 3.5–5.2)
PROT SERPL-MCNC: 7.7 G/DL (ref 6–8.5)
RBC # BLD AUTO: 4.77 10*6/MM3 (ref 3.77–5.28)
RSV RNA NPH QL NAA+NON-PROBE: NOT DETECTED
SARS-COV-2 RNA RESP QL NAA+PROBE: NOT DETECTED
SODIUM SERPL-SCNC: 138 MMOL/L (ref 136–145)
TROPONIN T SERPL HS-MCNC: 8 NG/L
WBC NRBC COR # BLD AUTO: 7.41 10*3/MM3 (ref 3.4–10.8)
WHOLE BLOOD HOLD COAG: NORMAL
WHOLE BLOOD HOLD SPECIMEN: NORMAL

## 2024-03-23 PROCEDURE — 25810000003 SODIUM CHLORIDE 0.9 % SOLUTION: Performed by: INTERNAL MEDICINE

## 2024-03-23 PROCEDURE — 96375 TX/PRO/DX INJ NEW DRUG ADDON: CPT

## 2024-03-23 PROCEDURE — 25010000002 AZITHROMYCIN PER 500 MG: Performed by: INTERNAL MEDICINE

## 2024-03-23 PROCEDURE — 25010000002 PROCHLORPERAZINE 10 MG/2ML SOLUTION

## 2024-03-23 PROCEDURE — 94640 AIRWAY INHALATION TREATMENT: CPT

## 2024-03-23 PROCEDURE — 93005 ELECTROCARDIOGRAM TRACING: CPT | Performed by: EMERGENCY MEDICINE

## 2024-03-23 PROCEDURE — G0378 HOSPITAL OBSERVATION PER HR: HCPCS

## 2024-03-23 PROCEDURE — 99222 1ST HOSP IP/OBS MODERATE 55: CPT | Performed by: INTERNAL MEDICINE

## 2024-03-23 PROCEDURE — 25010000002 METHYLPREDNISOLONE PER 125 MG: Performed by: EMERGENCY MEDICINE

## 2024-03-23 PROCEDURE — 94799 UNLISTED PULMONARY SVC/PX: CPT

## 2024-03-23 PROCEDURE — 25010000002 ENOXAPARIN PER 10 MG: Performed by: INTERNAL MEDICINE

## 2024-03-23 PROCEDURE — 99285 EMERGENCY DEPT VISIT HI MDM: CPT

## 2024-03-23 PROCEDURE — 83880 ASSAY OF NATRIURETIC PEPTIDE: CPT | Performed by: EMERGENCY MEDICINE

## 2024-03-23 PROCEDURE — 25010000002 ONDANSETRON PER 1 MG: Performed by: INTERNAL MEDICINE

## 2024-03-23 PROCEDURE — 84484 ASSAY OF TROPONIN QUANT: CPT | Performed by: EMERGENCY MEDICINE

## 2024-03-23 PROCEDURE — 85025 COMPLETE CBC W/AUTO DIFF WBC: CPT | Performed by: EMERGENCY MEDICINE

## 2024-03-23 PROCEDURE — 80053 COMPREHEN METABOLIC PANEL: CPT | Performed by: EMERGENCY MEDICINE

## 2024-03-23 PROCEDURE — 96374 THER/PROPH/DIAG INJ IV PUSH: CPT

## 2024-03-23 PROCEDURE — 83605 ASSAY OF LACTIC ACID: CPT | Performed by: INTERNAL MEDICINE

## 2024-03-23 PROCEDURE — 71045 X-RAY EXAM CHEST 1 VIEW: CPT

## 2024-03-23 PROCEDURE — 87637 SARSCOV2&INF A&B&RSV AMP PRB: CPT | Performed by: EMERGENCY MEDICINE

## 2024-03-23 PROCEDURE — 36415 COLL VENOUS BLD VENIPUNCTURE: CPT

## 2024-03-23 PROCEDURE — 87040 BLOOD CULTURE FOR BACTERIA: CPT | Performed by: INTERNAL MEDICINE

## 2024-03-23 PROCEDURE — 96372 THER/PROPH/DIAG INJ SC/IM: CPT

## 2024-03-23 RX ORDER — METHYLPREDNISOLONE SODIUM SUCCINATE 40 MG/ML
40 INJECTION, POWDER, LYOPHILIZED, FOR SOLUTION INTRAMUSCULAR; INTRAVENOUS EVERY 12 HOURS
Status: DISCONTINUED | OUTPATIENT
Start: 2024-03-24 | End: 2024-03-25

## 2024-03-23 RX ORDER — ACETAMINOPHEN 325 MG/1
650 TABLET ORAL EVERY 4 HOURS PRN
Status: DISCONTINUED | OUTPATIENT
Start: 2024-03-23 | End: 2024-03-26 | Stop reason: HOSPADM

## 2024-03-23 RX ORDER — IPRATROPIUM BROMIDE AND ALBUTEROL SULFATE 2.5; .5 MG/3ML; MG/3ML
3 SOLUTION RESPIRATORY (INHALATION)
Status: DISCONTINUED | OUTPATIENT
Start: 2024-03-23 | End: 2024-03-26 | Stop reason: HOSPADM

## 2024-03-23 RX ORDER — IPRATROPIUM BROMIDE AND ALBUTEROL SULFATE 2.5; .5 MG/3ML; MG/3ML
3 SOLUTION RESPIRATORY (INHALATION) ONCE
Status: COMPLETED | OUTPATIENT
Start: 2024-03-23 | End: 2024-03-23

## 2024-03-23 RX ORDER — POLYETHYLENE GLYCOL 3350 17 G/17G
17 POWDER, FOR SOLUTION ORAL DAILY PRN
Status: DISCONTINUED | OUTPATIENT
Start: 2024-03-23 | End: 2024-03-26 | Stop reason: HOSPADM

## 2024-03-23 RX ORDER — PROCHLORPERAZINE EDISYLATE 5 MG/ML
INJECTION INTRAMUSCULAR; INTRAVENOUS
Status: COMPLETED
Start: 2024-03-23 | End: 2024-03-25

## 2024-03-23 RX ORDER — BISACODYL 5 MG/1
5 TABLET, DELAYED RELEASE ORAL DAILY PRN
Status: DISCONTINUED | OUTPATIENT
Start: 2024-03-23 | End: 2024-03-26 | Stop reason: HOSPADM

## 2024-03-23 RX ORDER — ENOXAPARIN SODIUM 100 MG/ML
40 INJECTION SUBCUTANEOUS DAILY
Status: DISCONTINUED | OUTPATIENT
Start: 2024-03-23 | End: 2024-03-26 | Stop reason: HOSPADM

## 2024-03-23 RX ORDER — ONDANSETRON 2 MG/ML
4 INJECTION INTRAMUSCULAR; INTRAVENOUS EVERY 6 HOURS PRN
Status: DISCONTINUED | OUTPATIENT
Start: 2024-03-23 | End: 2024-03-26 | Stop reason: HOSPADM

## 2024-03-23 RX ORDER — PROCHLORPERAZINE EDISYLATE 5 MG/ML
2.5 INJECTION INTRAMUSCULAR; INTRAVENOUS EVERY 4 HOURS PRN
Status: DISCONTINUED | OUTPATIENT
Start: 2024-03-23 | End: 2024-03-26 | Stop reason: HOSPADM

## 2024-03-23 RX ORDER — NITROGLYCERIN 0.4 MG/1
0.4 TABLET SUBLINGUAL
Status: DISCONTINUED | OUTPATIENT
Start: 2024-03-23 | End: 2024-03-24

## 2024-03-23 RX ORDER — ACETAMINOPHEN 650 MG/1
650 SUPPOSITORY RECTAL EVERY 4 HOURS PRN
Status: DISCONTINUED | OUTPATIENT
Start: 2024-03-23 | End: 2024-03-26 | Stop reason: HOSPADM

## 2024-03-23 RX ORDER — METHYLPREDNISOLONE SODIUM SUCCINATE 125 MG/2ML
125 INJECTION, POWDER, LYOPHILIZED, FOR SOLUTION INTRAMUSCULAR; INTRAVENOUS ONCE
Status: COMPLETED | OUTPATIENT
Start: 2024-03-23 | End: 2024-03-23

## 2024-03-23 RX ORDER — SODIUM CHLORIDE 9 MG/ML
40 INJECTION, SOLUTION INTRAVENOUS AS NEEDED
Status: DISCONTINUED | OUTPATIENT
Start: 2024-03-23 | End: 2024-03-26 | Stop reason: HOSPADM

## 2024-03-23 RX ORDER — ACETAMINOPHEN 160 MG/5ML
650 SOLUTION ORAL EVERY 4 HOURS PRN
Status: DISCONTINUED | OUTPATIENT
Start: 2024-03-23 | End: 2024-03-26 | Stop reason: HOSPADM

## 2024-03-23 RX ORDER — AMOXICILLIN 250 MG
2 CAPSULE ORAL 2 TIMES DAILY PRN
Status: DISCONTINUED | OUTPATIENT
Start: 2024-03-23 | End: 2024-03-26 | Stop reason: HOSPADM

## 2024-03-23 RX ORDER — SODIUM CHLORIDE 0.9 % (FLUSH) 0.9 %
10 SYRINGE (ML) INJECTION EVERY 12 HOURS SCHEDULED
Status: DISCONTINUED | OUTPATIENT
Start: 2024-03-23 | End: 2024-03-26 | Stop reason: HOSPADM

## 2024-03-23 RX ORDER — SODIUM CHLORIDE 0.9 % (FLUSH) 0.9 %
10 SYRINGE (ML) INJECTION AS NEEDED
Status: DISCONTINUED | OUTPATIENT
Start: 2024-03-23 | End: 2024-03-26 | Stop reason: HOSPADM

## 2024-03-23 RX ORDER — BISACODYL 10 MG
10 SUPPOSITORY, RECTAL RECTAL DAILY PRN
Status: DISCONTINUED | OUTPATIENT
Start: 2024-03-23 | End: 2024-03-26 | Stop reason: HOSPADM

## 2024-03-23 RX ADMIN — Medication 10 ML: at 20:52

## 2024-03-23 RX ADMIN — ONDANSETRON 4 MG: 2 INJECTION INTRAMUSCULAR; INTRAVENOUS at 18:22

## 2024-03-23 RX ADMIN — IPRATROPIUM BROMIDE AND ALBUTEROL SULFATE 3 ML: .5; 3 SOLUTION RESPIRATORY (INHALATION) at 21:13

## 2024-03-23 RX ADMIN — METHYLPREDNISOLONE SODIUM SUCCINATE 125 MG: 125 INJECTION INTRAMUSCULAR; INTRAVENOUS at 15:47

## 2024-03-23 RX ADMIN — IPRATROPIUM BROMIDE AND ALBUTEROL SULFATE 3 ML: .5; 3 SOLUTION RESPIRATORY (INHALATION) at 15:40

## 2024-03-23 RX ADMIN — ENOXAPARIN SODIUM 40 MG: 100 INJECTION SUBCUTANEOUS at 18:23

## 2024-03-23 RX ADMIN — PROCHLORPERAZINE EDISYLATE 2.5 MG: 5 INJECTION INTRAMUSCULAR; INTRAVENOUS at 20:41

## 2024-03-23 RX ADMIN — AZITHROMYCIN MONOHYDRATE 500 MG: 500 INJECTION, POWDER, LYOPHILIZED, FOR SOLUTION INTRAVENOUS at 18:23

## 2024-03-23 NOTE — PLAN OF CARE
Future Appointments   Date Time Provider Department Center   5/17/2023  3:20 PM Henrik Moran MD [facility]   5/31/2023  1:00 PM [facility]   6/6/2023 10:50 AM My Ayala PA-C [facility]        Goal Outcome Evaluation:  Plan of Care Reviewed With: patient        Progress: no change  Outcome Evaluation: pt new admission from ED, pt presented to the unit with nausea, no vomiting at this time, see MAR. Pt currently on room air, but states she just started wearing oxygen as needed at home, SATs are in the 90s currently. Pt is not in any apparent distress, denies any current needs, call light within reach.

## 2024-03-23 NOTE — H&P
[facility]  Date: 3/23/2024   Patient Name: Gloria Sunshine  : 1960  MRN: [id number]  Primary Care Physician:  Anais May APRN  Date of admission: 3/23/2024    Subjective   Subjective     Chief Complaint: Shortness of breath    HPI:    Gloria Sunshine is a 63 y.o. female past medical history of COPD that presents to the emergency department for evaluation of shortness of breath x 3 weeks that got progressively worse today prompting her to seek further medical attention.  She does endorse some chest pain as well that she describes as burning, across her entire chest, intermittent, no known aggravating alleviating factors.  She denies any fevers, chills, sweats, palpitations, abdominal pain diarrhea constipation, dysuria, weakness, rash.  She does endorse nausea and vomiting as well.  In the emergency department started on IV steroids and respiratory treatments due to significant wheeze and respiratory distress on exam.  She has improved some but still not back to baseline therefore will be admitted for ongoing monitoring management.      Personal History     Past Medical History:  Past Medical History:   Diagnosis Date   • Elevated cholesterol    • Hyperlipidemia    • Hypertension          Past Surgical History:  Past Surgical History:   Procedure Laterality Date   • CYST REMOVAL Left     breast   • TUBAL ABDOMINAL LIGATION           Family History:   No family history on file.  Reviewed and noncontributory    Social History:   Social History     Tobacco Use   • Smoking status: Every Day     Current packs/day: 0.25     Average packs/day: 0.3 packs/day for 46.0 years (11.5 ttl pk-yrs)     Types: Cigarettes   • Smokeless tobacco: Never   • Tobacco comments:     Down to 3 cigs a day   Vaping Use   • Vaping status: Never Used   Substance Use Topics   • Alcohol use: Yes     Comment: occasionally   • Drug use: Not Currently         Home  Medications:  Tiotropium Bromide Monohydrate, albuterol, albuterol sulfate HFA, atorvastatin, budesonide-formoterol, escitalopram, hydrOXYzine, hydroCHLOROthiazide, mirtazapine, and multivitamin with minerals    Allergies:  Allergies   Allergen Reactions   • Influenza Vaccines Swelling     Patient got gouter on neck        Review of Systems   All systems were reviewed and negative except for: Shortness of breath    Objective   Objective     Vitals:   Temp:  [97.5 °F (36.4 °C)] 97.5 °F (36.4 °C)  Heart Rate:  [] 100  Resp:  [16-22] 16  BP: (158)/(100) 158/100    Physical Exam    Constitutional: Awake, alert, no acute distress   Eyes: Pupils equal, sclerae anicteric, no conjunctival injection   HENT: NCAT, mucous membranes moist   Neck: Supple, no thyromegaly, no lymphadenopathy, trachea midline   Respiratory: Diminished air entry bilaterally with bilateral expiratory wheeze   Cardiovascular: RRR, no murmurs, rubs, or gallops, palpable pedal pulses bilaterally   Gastrointestinal: Positive bowel sounds, soft, nontender, nondistended   Musculoskeletal: No bilateral ankle edema, no clubbing or cyanosis to extremities   Psychiatric: Appropriate affect, cooperative   Neurologic: Oriented x 3, strength symmetric in all extremities, Cranial Nerves grossly intact to confrontation, speech clear   Skin: No rashes     Result Review    Result Review:  I have personally reviewed the results from the time of this admission to 3/23/2024 17:05 EDT and agree with these findings:  [x]  Laboratory  []  Microbiology  [x]  Radiology  []  EKG/Telemetry   []  Cardiology/Vascular   []  Pathology  []  Old records  []  Other:      Assessment & Plan   Assessment / Plan     Assessment/Plan:   Acute COPD exacerbation: Started on respiratory treatments and IV steroids in the emergency department, will continue.  Supportive care.  Supplemental oxygen if needed, wean as able.  Serial labs.  Start azithromycin  Chest pain: Atypical.  Troponin  within normal limits.  Monitor on telemetry.  Supportive care.  Hypertension: Add back home medications      DVT prophylaxis:  Medical DVT prophylaxis orders are signed and held.          CODE STATUS:    Code Status (Patient has no pulse and is not breathing): CPR (Attempt to Resuscitate)  Medical Interventions (Patient has pulse or is breathing): Full Support      Admission Status:  I believe this patient meets observation status.    Electronically signed by Christopher Thurman Jr, MD, 03/23/24, 5:05 PM EDT.

## 2024-03-23 NOTE — ED PROVIDER NOTES
Time: 3:12 PM EDT  Date of encounter:  3/23/2024  Independent Historian/Clinical History and Information was obtained by:   Patient    History is limited by: N/A    Chief Complaint: Shortness of breath      History of Present Illness:  Patient is a 63 y.o. year old female who presents to the emergency department for evaluation of shortness of breath.  Patient has a history of hypertension, hyperlipidemia, COPD who presents with complaints of shortness of breath.  States that she has had progressive worsening shortness of breath over the last week.  Has had to use oxygen at home which she normally has not been using.  States that she has been on several liters because of worsening shortness of breath.  Also reports that she has had some aches and chills.  Denies any known sick contacts.  No other complaints this time.    hospitals    Patient Care Team  Primary Care Provider: Anais May APRN    Past Medical History:     Allergies   Allergen Reactions    Influenza Vaccines Swelling     Patient got gouter on neck      Past Medical History:   Diagnosis Date    Elevated cholesterol     Hyperlipidemia     Hypertension      Past Surgical History:   Procedure Laterality Date    CYST REMOVAL Left     breast    TUBAL ABDOMINAL LIGATION       No family history on file.    Home Medications:  Prior to Admission medications    Medication Sig Start Date End Date Taking? Authorizing Provider   albuterol (PROVENTIL) (2.5 MG/3ML) 0.083% nebulizer solution Take 2.5 mg by nebulization Every 4 (Four) Hours As Needed for Wheezing or Shortness of Air. 1/30/24   Lucille Lugo DO   albuterol sulfate  (90 Base) MCG/ACT inhaler Inhale 2 puffs into the lungs every 4 (four) hours as needed for Shortness of Air. 2/29/24   Erica Freeman APRN   atorvastatin (LIPITOR) 20 MG tablet Take 1 tablet by mouth nightly. 1/18/24      budesonide-formoterol (SYMBICORT) 80-4.5 MCG/ACT inhaler Inhale 2 puffs 2 (Two) Times a Day. 2/29/24    Erica Freeman APRN   escitalopram (LEXAPRO) 5 MG tablet Take 1 tablet by mouth daily. 11/6/23      hydroCHLOROthiazide 25 MG tablet Take 1 tablet by mouth daily. 11/6/23      hydrOXYzine (ATARAX) 25 MG tablet Take 1 tablet by mouth every 8 (eight) hours as needed for Anxiety. 11/6/23      mirtazapine (REMERON) 7.5 MG tablet Take 1 tablet by mouth nightly. 11/6/23      multivitamin with minerals tablet tablet Take 1 tablet by mouth Daily.    Provider, MD Steven   Tiotropium Bromide Monohydrate (Spiriva Respimat) 1.25 MCG/ACT aerosol solution inhaler Inhale 2 puffs Daily. 2/29/24   Erica Freeman APRN        Social History:   Social History     Tobacco Use    Smoking status: Every Day     Current packs/day: 0.25     Average packs/day: 0.3 packs/day for 46.0 years (11.5 ttl pk-yrs)     Types: Cigarettes    Smokeless tobacco: Never    Tobacco comments:     Down to 3 cigs a day   Vaping Use    Vaping status: Never Used   Substance Use Topics    Alcohol use: Yes     Comment: occasionally    Drug use: Not Currently         Review of Systems:  Review of Systems   Respiratory:  Positive for cough and shortness of breath.    Musculoskeletal:  Positive for myalgias.        Physical Exam:  /100 (BP Location: Left arm, Patient Position: Sitting)   Pulse 100   Temp 97.5 °F (36.4 °C) (Oral)   Resp 16   SpO2 93%     Physical Exam  Vitals and nursing note reviewed.   Constitutional:       Appearance: Normal appearance.   HENT:      Head: Normocephalic and atraumatic.   Eyes:      General: No scleral icterus.  Cardiovascular:      Rate and Rhythm: Normal rate and regular rhythm.      Heart sounds: Normal heart sounds.   Pulmonary:      Comments: Mild respiratory distress.  Decreased breath sounds bilaterally with wheezing noted.  Abdominal:      Palpations: Abdomen is soft.      Tenderness: There is no abdominal tenderness.   Musculoskeletal:         General: Normal range of motion.      Cervical back: Normal range of  motion.   Skin:     Findings: No rash.   Neurological:      General: No focal deficit present.      Mental Status: She is alert.                  Procedures:  Procedures      Medical Decision Making:      Comorbidities that affect care:    COPD    External Notes reviewed:    Reviewed admission from 1/28/2024      The following orders were placed and all results were independently analyzed by me:  Orders Placed This Encounter   Procedures    COVID-19, FLU A/B, RSV PCR 1 HR TAT - Swab, Nasopharynx    XR Chest 1 View    [place] Draw    Comprehensive Metabolic Panel    BNP    Single High Sensitivity Troponin T    CBC Auto Differential    NPO Diet NPO Type: Strict NPO    Undress & Gown    Continuous Pulse Oximetry    Vital Signs    Code Status and Medical Interventions:    Inpatient Hospitalist Consult    Oxygen Therapy- Nasal Cannula; Titrate 1-6 LPM Per SpO2; 90 - 95%    ECG 12 Lead ED Triage Standing Order; SOA    Insert Peripheral IV    Initiate Observation Status    CBC & Differential    Green Top (Gel)    Lavender Top    Gold Top - SST    Light Blue Top    Extra Tubes    Gray Top       Medications Given in the Emergency Department:  Medications   sodium chloride 0.9 % flush 10 mL (has no administration in time range)   methylPREDNISolone sodium succinate (SOLU-Medrol) injection 125 mg (125 mg Intravenous Given 3/23/24 1547)   ipratropium-albuterol (DUO-NEB) nebulizer solution 3 mL (3 mL Nebulization Given 3/23/24 1540)        ED Course:    ED Course as of 03/23/24 1656   Sat Mar 23, 2024   1510 EKG interpreted by me  Time: 1507  Heart rate 95  Sinus, normal QRS, normal axis, no acute ischemia [MA]   1646 Recheck patient.  Patient still with tachypnea and decreased breath sounds with wheezing.  I set the patient up in the bed and her heart rate jumped into the 120s.  Oxygen hanging out in the low 90s.  Discussed possible admission with the patient versus home.  Patient does not feel like she has had significant  improvement and does not think she would do well at home. [MA]   9966 Spoke with Dr. Thurman who agrees to admit   [MA]      ED Course User Index  [MA] Yan Felipe MD       Labs:    Lab Results (last 24 hours)       Procedure Component Value Units Date/Time    CBC & Differential [[id number]]  (Normal) Collected: 03/23/24 1524    Specimen: Blood Updated: 03/23/24 1535    Narrative:      The following orders were created for panel order CBC & Differential.  Procedure                               Abnormality         Status                     ---------                               -----------         ------                     CBC Auto Differential[[id number]]        Normal              Final result                 Please view results for these tests on the individual orders.    Comprehensive Metabolic Panel [[id number]]  (Abnormal) Collected: 03/23/24 1524    Specimen: Blood Updated: 03/23/24 1600     Glucose 112 mg/dL      BUN 6 mg/dL      Creatinine 0.66 mg/dL      Sodium 138 mmol/L      Potassium 4.6 mmol/L      Comment: Slight hemolysis detected by analyzer. Result may be falsely elevated.        Chloride 99 mmol/L      CO2 26.5 mmol/L      Calcium 9.7 mg/dL      Total Protein 7.7 g/dL      Albumin 4.7 g/dL      ALT (SGPT) 24 U/L      AST (SGOT) 27 U/L      Comment: Slight hemolysis detected by analyzer. Result may be falsely elevated.        Alkaline Phosphatase 70 U/L      Total Bilirubin 0.3 mg/dL      Globulin 3.0 gm/dL      A/G Ratio 1.6 g/dL      BUN/Creatinine Ratio 9.1     Anion Gap 12.5 mmol/L      eGFR 99.3 mL/min/1.73     Narrative:      GFR Normal >60  Chronic Kidney Disease <60  Kidney Failure <15      BNP [[id number]]  (Normal) Collected: 03/23/24 1524    Specimen: Blood Updated: 03/23/24 1555     proBNP 128.2 pg/mL     Narrative:      This assay is used as an aid in the diagnosis of individuals suspected of having heart failure. It can be used as an aid in the diagnosis of acute decompensated  heart failure (ADHF) in patients presenting with signs and symptoms of ADHF to the emergency department (ED). In addition, NT-proBNP of <300 pg/mL indicates ADHF is not likely.    Age Range Result Interpretation  NT-proBNP Concentration (pg/mL:      <50             Positive            >450                   Gray                 300-450                    Negative             <300    50-75           Positive            >900                  Gray                300-900                  Negative            <300      >75             Positive            >1800                  Gray                300-1800                  Negative            <300    Single High Sensitivity Troponin T [[id number]]  (Normal) Collected: 03/23/24 1524    Specimen: Blood Updated: 03/23/24 1559     HS Troponin T 8 ng/L     Narrative:      High Sensitive Troponin T Reference Range:  <14.0 ng/L- Negative Female for AMI  <22.0 ng/L- Negative Male for AMI  >=14 - Abnormal Female indicating possible myocardial injury.  >=22 - Abnormal Male indicating possible myocardial injury.   Clinicians would have to utilize clinical acumen, EKG, Troponin, and serial changes to determine if it is an Acute Myocardial Infarction or myocardial injury due to an underlying chronic condition.         CBC Auto Differential [[id number]]  (Normal) Collected: 03/23/24 1524    Specimen: Blood Updated: 03/23/24 1535     WBC 7.41 10*3/mm3      RBC 4.77 10*6/mm3      Hemoglobin 13.8 g/dL      Hematocrit 41.6 %      MCV 87.2 fL      MCH 28.9 pg      MCHC 33.2 g/dL      RDW 14.1 %      RDW-SD 45.3 fl      MPV 8.6 fL      Platelets 424 10*3/mm3      Neutrophil % 62.5 %      Lymphocyte % 22.8 %      Monocyte % 8.8 %      Eosinophil % 4.7 %      Basophil % 1.1 %      Immature Grans % 0.1 %      Neutrophils, Absolute 4.63 10*3/mm3      Lymphocytes, Absolute 1.69 10*3/mm3      Monocytes, Absolute 0.65 10*3/mm3      Eosinophils, Absolute 0.35 10*3/mm3      Basophils, Absolute 0.08  10*3/mm3      Immature Grans, Absolute 0.01 10*3/mm3      nRBC 0.0 /100 WBC     COVID-19, FLU A/B, RSV PCR 1 HR TAT - Swab, Nasopharynx [[id number]]  (Normal) Collected: 03/23/24 1526    Specimen: Swab from Nasopharynx Updated: 03/23/24 1619     COVID19 Not Detected     Influenza A PCR Not Detected     Influenza B PCR Not Detected     RSV, PCR Not Detected    Narrative:      Fact sheet for providers: https://www.fda.gov/media/967117/download    Fact sheet for patients: https://www.fda.gov/media/736940/download    Test performed by PCR.             Imaging:    XR Chest 1 View    Result Date: 3/23/2024  XR CHEST 1 VW-  Date of Exam: 3/23/2024 3:00 PM  Indication: SOA Triage Protocol  Comparison: CT chest 1/28/2024,  Findings: Upper lobe emphysematous changes are redemonstrated. No pneumothorax or large pleural effusion is seen. Lungs are clear. Cardiomediastinal contours appear stable.      Impression: Emphysematous changes without evidence of acute cardiopulmonary abnormality.   Electronically Signed By-Maryuri Navas MD On:3/23/2024 3:35 PM         Differential Diagnosis and Discussion:    Dyspnea: Differential diagnosis includes but is not limited to metabolic acidosis, neurological disorders, psychogenic, asthma, pneumothorax, upper airway obstruction, COPD, pneumonia, noncardiogenic pulmonary edema, interstitial lung disease, anemia, congestive heart failure, and pulmonary embolism    All labs were reviewed and interpreted by me.  All X-rays impressions were independently interpreted by me.  EKG was interpreted by me.    MDM     Patient is 61-year-old female who has a history of COPD who presents with shortness of breath.  Having a COPD exacerbation.  Was given steroids and nebs here with some improvement but still tachypneic and having some respiratory distress.  Need admission to the hospital for further workup management.  Patient was admitted to the hospital for further workup.          Patient Care  Considerations:          Consultants/Shared Management Plan:    Hospitalist: I have discussed the case with Dr. Thurman who agrees to accept the patient for admission.    Social Determinants of Health:    Patient is independent, reliable, and has access to care.       Disposition and Care Coordination:    Admit:   Through independent evaluation of the patient's history, physical, and imperical data, the patient meets criteria for inpatient admission to the hospital.        Final diagnoses:   COPD with acute exacerbation        ED Disposition       ED Disposition   Decision to Admit    Condition   --    Comment   Level of Care: Remote Telemetry [26]   Diagnosis: COPD exacerbation [636948]                 This medical record created using voice recognition software.             Yan Felipe MD  03/23/24 4838

## 2024-03-24 LAB
ALBUMIN SERPL-MCNC: 4.1 G/DL (ref 3.5–5.2)
ALBUMIN/GLOB SERPL: 1.6 G/DL
ALP SERPL-CCNC: 58 U/L (ref 39–117)
ALT SERPL W P-5'-P-CCNC: 18 U/L (ref 1–33)
ANION GAP SERPL CALCULATED.3IONS-SCNC: 11 MMOL/L (ref 5–15)
AST SERPL-CCNC: 18 U/L (ref 1–32)
B PARAPERT DNA SPEC QL NAA+PROBE: NOT DETECTED
B PERT DNA SPEC QL NAA+PROBE: NOT DETECTED
BASOPHILS # BLD AUTO: 0.01 10*3/MM3 (ref 0–0.2)
BASOPHILS NFR BLD AUTO: 0.1 % (ref 0–1.5)
BILIRUB SERPL-MCNC: 0.2 MG/DL (ref 0–1.2)
BUN SERPL-MCNC: 10 MG/DL (ref 8–23)
BUN/CREAT SERPL: 16.4 (ref 7–25)
C PNEUM DNA NPH QL NAA+NON-PROBE: NOT DETECTED
CALCIUM SPEC-SCNC: 9.6 MG/DL (ref 8.6–10.5)
CHLORIDE SERPL-SCNC: 101 MMOL/L (ref 98–107)
CO2 SERPL-SCNC: 25 MMOL/L (ref 22–29)
CREAT SERPL-MCNC: 0.61 MG/DL (ref 0.57–1)
DEPRECATED RDW RBC AUTO: 44 FL (ref 37–54)
EGFRCR SERPLBLD CKD-EPI 2021: 101.2 ML/MIN/1.73
EOSINOPHIL # BLD AUTO: 0 10*3/MM3 (ref 0–0.4)
EOSINOPHIL NFR BLD AUTO: 0 % (ref 0.3–6.2)
ERYTHROCYTE [DISTWIDTH] IN BLOOD BY AUTOMATED COUNT: 13.8 % (ref 12.3–15.4)
FLUAV SUBTYP SPEC NAA+PROBE: NOT DETECTED
FLUBV RNA ISLT QL NAA+PROBE: NOT DETECTED
GLOBULIN UR ELPH-MCNC: 2.6 GM/DL
GLUCOSE SERPL-MCNC: 117 MG/DL (ref 65–99)
HADV DNA SPEC NAA+PROBE: NOT DETECTED
HCOV 229E RNA SPEC QL NAA+PROBE: NOT DETECTED
HCOV HKU1 RNA SPEC QL NAA+PROBE: NOT DETECTED
HCOV NL63 RNA SPEC QL NAA+PROBE: NOT DETECTED
HCOV OC43 RNA SPEC QL NAA+PROBE: NOT DETECTED
HCT VFR BLD AUTO: 37.2 % (ref 34–46.6)
HGB BLD-MCNC: 12.1 G/DL (ref 12–15.9)
HMPV RNA NPH QL NAA+NON-PROBE: NOT DETECTED
HPIV1 RNA ISLT QL NAA+PROBE: NOT DETECTED
HPIV2 RNA SPEC QL NAA+PROBE: NOT DETECTED
HPIV3 RNA NPH QL NAA+PROBE: NOT DETECTED
HPIV4 P GENE NPH QL NAA+PROBE: NOT DETECTED
IMM GRANULOCYTES # BLD AUTO: 0.02 10*3/MM3 (ref 0–0.05)
IMM GRANULOCYTES NFR BLD AUTO: 0.2 % (ref 0–0.5)
LYMPHOCYTES # BLD AUTO: 1.26 10*3/MM3 (ref 0.7–3.1)
LYMPHOCYTES NFR BLD AUTO: 14.7 % (ref 19.6–45.3)
M PNEUMO IGG SER IA-ACNC: NOT DETECTED
MCH RBC QN AUTO: 28.4 PG (ref 26.6–33)
MCHC RBC AUTO-ENTMCNC: 32.5 G/DL (ref 31.5–35.7)
MCV RBC AUTO: 87.3 FL (ref 79–97)
MONOCYTES # BLD AUTO: 0.34 10*3/MM3 (ref 0.1–0.9)
MONOCYTES NFR BLD AUTO: 4 % (ref 5–12)
NEUTROPHILS NFR BLD AUTO: 6.93 10*3/MM3 (ref 1.7–7)
NEUTROPHILS NFR BLD AUTO: 81 % (ref 42.7–76)
NRBC BLD AUTO-RTO: 0 /100 WBC (ref 0–0.2)
PLATELET # BLD AUTO: 411 10*3/MM3 (ref 140–450)
PMV BLD AUTO: 8.8 FL (ref 6–12)
POTASSIUM SERPL-SCNC: 4.6 MMOL/L (ref 3.5–5.2)
PROT SERPL-MCNC: 6.7 G/DL (ref 6–8.5)
QT INTERVAL: 365 MS
QTC INTERVAL: 459 MS
RBC # BLD AUTO: 4.26 10*6/MM3 (ref 3.77–5.28)
RHINOVIRUS RNA SPEC NAA+PROBE: NOT DETECTED
RSV RNA NPH QL NAA+NON-PROBE: NOT DETECTED
SARS-COV-2 RNA RESP QL NAA+PROBE: NOT DETECTED
SODIUM SERPL-SCNC: 137 MMOL/L (ref 136–145)
WBC NRBC COR # BLD AUTO: 8.56 10*3/MM3 (ref 3.4–10.8)

## 2024-03-24 PROCEDURE — G0378 HOSPITAL OBSERVATION PER HR: HCPCS

## 2024-03-24 PROCEDURE — 80053 COMPREHEN METABOLIC PANEL: CPT | Performed by: INTERNAL MEDICINE

## 2024-03-24 PROCEDURE — 87205 SMEAR GRAM STAIN: CPT | Performed by: PHYSICIAN ASSISTANT

## 2024-03-24 PROCEDURE — 25010000002 PROCHLORPERAZINE 10 MG/2ML SOLUTION: Performed by: PHYSICIAN ASSISTANT

## 2024-03-24 PROCEDURE — 96376 TX/PRO/DX INJ SAME DRUG ADON: CPT

## 2024-03-24 PROCEDURE — 96375 TX/PRO/DX INJ NEW DRUG ADDON: CPT

## 2024-03-24 PROCEDURE — 94664 DEMO&/EVAL PT USE INHALER: CPT

## 2024-03-24 PROCEDURE — 25010000002 PROCHLORPERAZINE 10 MG/2ML SOLUTION

## 2024-03-24 PROCEDURE — 85025 COMPLETE CBC W/AUTO DIFF WBC: CPT | Performed by: INTERNAL MEDICINE

## 2024-03-24 PROCEDURE — 25010000002 METHYLPREDNISOLONE PER 40 MG: Performed by: INTERNAL MEDICINE

## 2024-03-24 PROCEDURE — 94799 UNLISTED PULMONARY SVC/PX: CPT

## 2024-03-24 PROCEDURE — 25810000003 SODIUM CHLORIDE 0.9 % SOLUTION: Performed by: INTERNAL MEDICINE

## 2024-03-24 PROCEDURE — 96372 THER/PROPH/DIAG INJ SC/IM: CPT

## 2024-03-24 PROCEDURE — 25010000002 ENOXAPARIN PER 10 MG: Performed by: INTERNAL MEDICINE

## 2024-03-24 PROCEDURE — 99232 SBSQ HOSP IP/OBS MODERATE 35: CPT | Performed by: INTERNAL MEDICINE

## 2024-03-24 PROCEDURE — 0202U NFCT DS 22 TRGT SARS-COV-2: CPT | Performed by: INTERNAL MEDICINE

## 2024-03-24 PROCEDURE — 25010000002 AZITHROMYCIN PER 500 MG: Performed by: INTERNAL MEDICINE

## 2024-03-24 PROCEDURE — 87070 CULTURE OTHR SPECIMN AEROBIC: CPT | Performed by: PHYSICIAN ASSISTANT

## 2024-03-24 RX ORDER — MIRTAZAPINE 15 MG/1
7.5 TABLET, FILM COATED ORAL NIGHTLY
Status: DISCONTINUED | OUTPATIENT
Start: 2024-03-24 | End: 2024-03-26 | Stop reason: HOSPADM

## 2024-03-24 RX ORDER — ATORVASTATIN CALCIUM 20 MG/1
20 TABLET, FILM COATED ORAL NIGHTLY
Status: DISCONTINUED | OUTPATIENT
Start: 2024-03-24 | End: 2024-03-26 | Stop reason: HOSPADM

## 2024-03-24 RX ORDER — HYDROCHLOROTHIAZIDE 12.5 MG/1
25 TABLET ORAL DAILY
Status: DISCONTINUED | OUTPATIENT
Start: 2024-03-24 | End: 2024-03-26 | Stop reason: HOSPADM

## 2024-03-24 RX ORDER — MULTIPLE VITAMINS W/ MINERALS TAB 9MG-400MCG
1 TAB ORAL DAILY
Status: DISCONTINUED | OUTPATIENT
Start: 2024-03-24 | End: 2024-03-26 | Stop reason: HOSPADM

## 2024-03-24 RX ORDER — BENZONATATE 100 MG/1
100 CAPSULE ORAL 3 TIMES DAILY PRN
Status: DISCONTINUED | OUTPATIENT
Start: 2024-03-24 | End: 2024-03-26 | Stop reason: HOSPADM

## 2024-03-24 RX ORDER — HYDROXYZINE HYDROCHLORIDE 25 MG/1
25 TABLET, FILM COATED ORAL EVERY 8 HOURS PRN
Status: DISCONTINUED | OUTPATIENT
Start: 2024-03-24 | End: 2024-03-26 | Stop reason: HOSPADM

## 2024-03-24 RX ORDER — ESCITALOPRAM OXALATE 10 MG/1
5 TABLET ORAL DAILY
Status: DISCONTINUED | OUTPATIENT
Start: 2024-03-24 | End: 2024-03-26 | Stop reason: HOSPADM

## 2024-03-24 RX ADMIN — ATORVASTATIN CALCIUM 20 MG: 20 TABLET, FILM COATED ORAL at 20:40

## 2024-03-24 RX ADMIN — METHYLPREDNISOLONE SODIUM SUCCINATE 40 MG: 40 INJECTION INTRAMUSCULAR; INTRAVENOUS at 03:42

## 2024-03-24 RX ADMIN — AZITHROMYCIN MONOHYDRATE 500 MG: 500 INJECTION, POWDER, LYOPHILIZED, FOR SOLUTION INTRAVENOUS at 18:03

## 2024-03-24 RX ADMIN — IPRATROPIUM BROMIDE AND ALBUTEROL SULFATE 3 ML: .5; 3 SOLUTION RESPIRATORY (INHALATION) at 20:24

## 2024-03-24 RX ADMIN — MIRTAZAPINE 7.5 MG: 15 TABLET, FILM COATED ORAL at 20:40

## 2024-03-24 RX ADMIN — PROCHLORPERAZINE EDISYLATE 2.5 MG: 5 INJECTION INTRAMUSCULAR; INTRAVENOUS at 08:59

## 2024-03-24 RX ADMIN — BENZONATATE 100 MG: 100 CAPSULE ORAL at 20:40

## 2024-03-24 RX ADMIN — BENZONATATE 100 MG: 100 CAPSULE ORAL at 03:42

## 2024-03-24 RX ADMIN — ESCITALOPRAM OXALATE 5 MG: 10 TABLET ORAL at 08:58

## 2024-03-24 RX ADMIN — ENOXAPARIN SODIUM 40 MG: 100 INJECTION SUBCUTANEOUS at 08:58

## 2024-03-24 RX ADMIN — PROCHLORPERAZINE EDISYLATE 2.5 MG: 5 INJECTION INTRAMUSCULAR; INTRAVENOUS at 15:24

## 2024-03-24 RX ADMIN — METHYLPREDNISOLONE SODIUM SUCCINATE 40 MG: 40 INJECTION INTRAMUSCULAR; INTRAVENOUS at 15:24

## 2024-03-24 RX ADMIN — IPRATROPIUM BROMIDE AND ALBUTEROL SULFATE 3 ML: .5; 3 SOLUTION RESPIRATORY (INHALATION) at 12:04

## 2024-03-24 RX ADMIN — Medication 10 ML: at 09:00

## 2024-03-24 RX ADMIN — IPRATROPIUM BROMIDE AND ALBUTEROL SULFATE 3 ML: .5; 3 SOLUTION RESPIRATORY (INHALATION) at 07:55

## 2024-03-24 RX ADMIN — Medication 1 TABLET: at 08:58

## 2024-03-24 RX ADMIN — HYDROCHLOROTHIAZIDE 25 MG: 12.5 TABLET ORAL at 08:59

## 2024-03-24 RX ADMIN — PROCHLORPERAZINE EDISYLATE 2.5 MG: 5 INJECTION INTRAMUSCULAR; INTRAVENOUS at 20:40

## 2024-03-24 NOTE — PLAN OF CARE
Goal Outcome Evaluation:           Progress: no change  Outcome Evaluation: Patient a&ox4. Medicated prn per MAR for c/o nausea and cough. Sleeping in between care. Sputum sample obtained and sent to lab.

## 2024-03-24 NOTE — PLAN OF CARE
Goal Outcome Evaluation:  Plan of Care Reviewed With: patient        Progress: no change  Outcome Evaluation: Pt is AOx4, SOA with movement, and medicated per MAR for nausea throughout the shift. Pt appears to be in no distress, denies any current needs and has call light within reach

## 2024-03-25 PROBLEM — J44.1 COPD WITH ACUTE EXACERBATION: Status: ACTIVE | Noted: 2024-03-25

## 2024-03-25 PROCEDURE — 25810000003 SODIUM CHLORIDE 0.9 % SOLUTION: Performed by: INTERNAL MEDICINE

## 2024-03-25 PROCEDURE — 25010000002 AZITHROMYCIN PER 500 MG: Performed by: INTERNAL MEDICINE

## 2024-03-25 PROCEDURE — 96376 TX/PRO/DX INJ SAME DRUG ADON: CPT

## 2024-03-25 PROCEDURE — 97161 PT EVAL LOW COMPLEX 20 MIN: CPT

## 2024-03-25 PROCEDURE — 94664 DEMO&/EVAL PT USE INHALER: CPT

## 2024-03-25 PROCEDURE — G0378 HOSPITAL OBSERVATION PER HR: HCPCS

## 2024-03-25 PROCEDURE — 94760 N-INVAS EAR/PLS OXIMETRY 1: CPT

## 2024-03-25 PROCEDURE — 94799 UNLISTED PULMONARY SVC/PX: CPT

## 2024-03-25 PROCEDURE — 25010000002 PROCHLORPERAZINE 10 MG/2ML SOLUTION

## 2024-03-25 PROCEDURE — 25010000002 METHYLPREDNISOLONE PER 40 MG: Performed by: INTERNAL MEDICINE

## 2024-03-25 PROCEDURE — 99232 SBSQ HOSP IP/OBS MODERATE 35: CPT | Performed by: INTERNAL MEDICINE

## 2024-03-25 PROCEDURE — 96372 THER/PROPH/DIAG INJ SC/IM: CPT

## 2024-03-25 PROCEDURE — 25010000002 ENOXAPARIN PER 10 MG: Performed by: INTERNAL MEDICINE

## 2024-03-25 RX ORDER — PREDNISONE 20 MG/1
40 TABLET ORAL
Status: DISCONTINUED | OUTPATIENT
Start: 2024-03-26 | End: 2024-03-26 | Stop reason: HOSPADM

## 2024-03-25 RX ORDER — GUAIFENESIN/DEXTROMETHORPHAN 100-10MG/5
5 SYRUP ORAL EVERY 4 HOURS PRN
Status: DISCONTINUED | OUTPATIENT
Start: 2024-03-25 | End: 2024-03-26 | Stop reason: HOSPADM

## 2024-03-25 RX ADMIN — IPRATROPIUM BROMIDE AND ALBUTEROL SULFATE 3 ML: .5; 3 SOLUTION RESPIRATORY (INHALATION) at 07:27

## 2024-03-25 RX ADMIN — GUAIFENESIN AND DEXTROMETHORPHAN 5 ML: 100; 10 SYRUP ORAL at 20:58

## 2024-03-25 RX ADMIN — IPRATROPIUM BROMIDE AND ALBUTEROL SULFATE 3 ML: .5; 3 SOLUTION RESPIRATORY (INHALATION) at 11:19

## 2024-03-25 RX ADMIN — METHYLPREDNISOLONE SODIUM SUCCINATE 40 MG: 40 INJECTION INTRAMUSCULAR; INTRAVENOUS at 05:23

## 2024-03-25 RX ADMIN — BENZONATATE 100 MG: 100 CAPSULE ORAL at 18:30

## 2024-03-25 RX ADMIN — MIRTAZAPINE 7.5 MG: 15 TABLET, FILM COATED ORAL at 20:58

## 2024-03-25 RX ADMIN — Medication 10 ML: at 08:28

## 2024-03-25 RX ADMIN — Medication 1 TABLET: at 08:26

## 2024-03-25 RX ADMIN — ESCITALOPRAM OXALATE 5 MG: 10 TABLET ORAL at 08:26

## 2024-03-25 RX ADMIN — IPRATROPIUM BROMIDE AND ALBUTEROL SULFATE 3 ML: .5; 3 SOLUTION RESPIRATORY (INHALATION) at 00:09

## 2024-03-25 RX ADMIN — IPRATROPIUM BROMIDE AND ALBUTEROL SULFATE 3 ML: .5; 3 SOLUTION RESPIRATORY (INHALATION) at 19:51

## 2024-03-25 RX ADMIN — ENOXAPARIN SODIUM 40 MG: 100 INJECTION SUBCUTANEOUS at 08:27

## 2024-03-25 RX ADMIN — PROCHLORPERAZINE EDISYLATE 2.5 MG: 5 INJECTION INTRAMUSCULAR; INTRAVENOUS at 08:28

## 2024-03-25 RX ADMIN — ATORVASTATIN CALCIUM 20 MG: 20 TABLET, FILM COATED ORAL at 20:58

## 2024-03-25 RX ADMIN — BENZONATATE 100 MG: 100 CAPSULE ORAL at 05:23

## 2024-03-25 RX ADMIN — Medication 10 ML: at 20:58

## 2024-03-25 RX ADMIN — HYDROCHLOROTHIAZIDE 25 MG: 12.5 TABLET ORAL at 08:27

## 2024-03-25 NOTE — PLAN OF CARE
Goal Outcome Evaluation:  Plan of Care Reviewed With: patient        Progress: improving  Outcome Evaluation: Pt AOx4, abmulated independently as tolerated. Pt medicated for cough and nausea a few times this shift. Pt appears to be in no distress, denies any current needs and has call light within reach.

## 2024-03-25 NOTE — THERAPY EVALUATION
Acute Care - Physical Therapy Initial Evaluation   Tian[facility]     Patient Name: Gloria Sunshine  : 1960  MRN: [id number]  Today's Date: 3/25/2024      Visit Dx:     ICD-10-CM ICD-9-CM   1. COPD with acute exacerbation  J44.1 491.21   2. Difficulty walking  R26.2 719.7     Patient Active Problem List   Diagnosis    Acute respiratory failure with hypoxia    COPD exacerbation    Anxiety    Depression    Hypertension    Hyperlipidemia LDL goal <100    Severe malnutrition    COPD with acute exacerbation     Past Medical History:   Diagnosis Date    Elevated cholesterol     Hyperlipidemia     Hypertension      Past Surgical History:   Procedure Laterality Date    CYST REMOVAL Left     breast    TUBAL ABDOMINAL LIGATION       PT Assessment (Last 12 Hours)       PT Evaluation and Treatment       Row Name 24 1400          Physical Therapy Time and Intention    Subjective Information no complaints  -CS     Document Type evaluation  -CS     Mode of Treatment individual therapy;physical therapy  -CS     Patient Effort good  -CS     Symptoms Noted During/After Treatment shortness of breath  -CS       Row Name 24 1400          General Information    Patient Profile Reviewed yes  -CS     Patient Observations alert;cooperative;agree to therapy  -CS     Prior Level of Function independent:;all household mobility;gait;transfer;bed mobility;ADL's  -CS     Equipment Currently Used at Home none  has a walk-in shower with seat available if needed  -CS     Existing Precautions/Restrictions oxygen therapy device and L/min  2L as needed right now  -CS     Barriers to Rehab none identified  -CS       Row Name 24 1400          Living Environment    Current Living Arrangements home  -CS     Home Accessibility stairs to enter home;stairs within home  -CS     People in Home spouse  -CS     Primary Care Provided by self  -CS       Row Name 24 1400          Home Main Entrance    Number of Stairs, Main  Entrance none  -CS       Row Name 03/25/24 1400          Stairs Within Home, Primary    Number of Stairs, Within Home, Primary none  -CS       Row Name 03/25/24 1400          Pain    Pretreatment Pain Rating 0/10 - no pain  -CS     Posttreatment Pain Rating 0/10 - no pain  -CS       Row Name 03/25/24 1400          Cognition    Orientation Status (Cognition) oriented x 4  -CS       Row Name 03/25/24 1400          Range of Motion Comprehensive    General Range of Motion no range of motion deficits identified  -CS       Row Name 03/25/24 1400          Strength Comprehensive (MMT)    General Manual Muscle Testing (MMT) Assessment no strength deficits identified  -CS       Row Name 03/25/24 1400          Bed Mobility    Bed Mobility bed mobility (all) activities  -CS     All Activities, Silas (Bed Mobility) independent  -CS       Row Name 03/25/24 1400          Transfers    Comment, (Transfers) Patient completed functional transfers independently without assistive device.  -CS       Row Name 03/25/24 1400          Gait/Stairs (Locomotion)    Gait/Stairs Locomotion gait/ambulation independence  -CS     Silas Level (Gait) independent  -CS     Assistive Device (Gait) other (see comments)  no AD  -CS     Patient was able to Ambulate yes  -CS     Distance in Feet (Gait) 30  -CS     Pattern (Gait) step-through  -CS     Comment, (Gait/Stairs) Patient had increased SOB post ambulation and placed nasal cannula back on with O2 at 2L  -CS       Row Name 03/25/24 1400          Safety Issues, Functional Mobility    Impairments Affecting Function (Mobility) endurance/activity tolerance;shortness of breath  -CS       Row Name 03/25/24 1400          Balance    Balance Assessment standing dynamic balance  -CS     Dynamic Standing Balance independent  -CS       Row Name 03/25/24 1400          Plan of Care Review    Plan of Care Reviewed With patient  -CS     Progress no change  -CS     Outcome Evaluation Patient presents  with no physical limitations that impede her ability to return home independently.  She was encouraged to continue ambulating as tolerated while here at the hospital.  She will be discharged from physical therapy caseload.  Recommend follow-up of pulmonary rehab as outpatient.  -CS       Row Name 03/25/24 1400          Positioning and Restraints    Pre-Treatment Position standing in room  -CS     Post Treatment Position bed  -CS     In Bed sitting EOB;call light within reach;encouraged to call for assist;exit alarm on  -CS       Row Name 03/25/24 1400          Therapy Assessment/Plan (PT)    Criteria for Skilled Interventions Met (PT) no problems identified which require skilled intervention  -CS     Therapy Frequency (PT) evaluation only  -CS       Row Name 03/25/24 1400          PT Evaluation Complexity    History, PT Evaluation Complexity no personal factors and/or comorbidities  -CS     Examination of Body Systems (PT Eval Complexity) total of 4 or more elements  -CS     Clinical Presentation (PT Evaluation Complexity) stable  -CS     Clinical Decision Making (PT Evaluation Complexity) low complexity  -CS     Overall Complexity (PT Evaluation Complexity) low complexity  -CS       Row Name 03/25/24 1400          Therapy Plan Review/Discharge Plan (PT)    Therapy Plan Review (PT) evaluation/treatment results reviewed;patient  -CS       Row Name 03/25/24 1400          Physical Therapy Goals    Problem Specific Goal Selection (PT) problem specific goal 1, PT  -CS       Row Name 03/25/24 1400          Problem Specific Goal 1 (PT)    Problem Specific Goal 1 (PT) Complete physical therapy evaluation  -CS     Time Frame (Problem Specific Goal 1, PT) by discharge  -CS     Progress/Outcome (Problem Specific Goal 1, PT) goal met  -CS               User Key  (r) = Recorded By, (t) = Taken By, (c) = Cosigned By      Initials Name Provider Type    CS Mikaela Joel, PT Physical Therapist                      PT  Recommendation and Plan  Anticipated Discharge Disposition (PT): home, home with assist  Therapy Frequency (PT): evaluation only  Plan of Care Reviewed With: patient  Progress: no change  Outcome Evaluation: Patient presents with no physical limitations that impede her ability to return home independently.  She was encouraged to continue ambulating as tolerated while here at the hospital.  She will be discharged from physical therapy caseload.  Recommend follow-up of pulmonary rehab as outpatient.   Outcome Measures       Row Name 03/25/24 1400             How much help from another person do you currently need...    Turning from your back to your side while in flat bed without using bedrails? 4  -CS      Moving from lying on back to sitting on the side of a flat bed without bedrails? 4  -CS      Moving to and from a bed to a chair (including a wheelchair)? 4  -CS      Standing up from a chair using your arms (e.g., wheelchair, bedside chair)? 4  -CS      Climbing 3-5 steps with a railing? 4  -CS      To walk in hospital room? 4  -CS      AM-PAC 6 Clicks Score (PT) 24  -CS      Highest Level of Mobility Goal 8 --> Walked 250 feet or more  -CS         Functional Assessment    Outcome Measure Options AM-PAC 6 Clicks Basic Mobility (PT)  -CS                User Key  (r) = Recorded By, (t) = Taken By, (c) = Cosigned By      Initials Name Provider Type    Mikaela Davis PT Physical Therapist                     Time Calculation:    PT Charges       Row Name 03/25/24 1425             Time Calculation    PT Received On 03/25/24  -CS         Untimed Charges    PT Eval/Re-eval Minutes 25  -CS         Total Minutes    Untimed Charges Total Minutes 25  -CS       Total Minutes 25  -CS                User Key  (r) = Recorded By, (t) = Taken By, (c) = Cosigned By      Initials Name Provider Type    Mikaela Davis PT Physical Therapist                  Therapy Charges for Today       Code Description Service Date Service  Provider Modifiers Qty    [id number] HC PT EVAL LOW COMPLEXITY 2 3/25/2024 Mikaela Joel, PT GP 1            PT G-Codes  Outcome Measure Options: AM-PAC 6 Clicks Basic Mobility (PT)  AM-PAC 6 Clicks Score (PT): 24    Mikaela Joel, PT  3/25/2024

## 2024-03-25 NOTE — CONSULTS
Pt is interested in attending Pulmonary rehabPulmonary Rehab Phase I - Occurrence #1    Education Topics:  Pulmonary Rehab yes     Topic Components:  Exercise yes     Teaching Aids:  Phase 2 Brochure yes     Teaching Method:  Written Instruction yes     Teaching Recipient:  Patient yes       Recovery/Discharge Instructions:  Pulmonary Rehab Phase 2 yes       Patient Qualification:  Pulmonary Rehab Phase 2 yes

## 2024-03-25 NOTE — PROGRESS NOTES
[facility]   Hospitalist Progress Note  Date: 3/25/2024  Patient Name: Gloria Sunshine  : 1960  MRN: [id number]  Date of admission: 3/23/2024  Room/Bed: [facility]      Subjective   Subjective     Chief Complaint: short of air     Summary:Gloria Sunshine is a 63 y.o. female female past medical history of COPD that presents to the emergency department for evaluation of shortness of breath x 3 weeks that got progressively worse today prompting her to seek further medical attention.  She does endorse some chest pain as well that she describes as burning, across her entire chest, intermittent, no known aggravating alleviating factors.  She denies any fevers, chills, sweats, palpitations, abdominal pain diarrhea constipation, dysuria, weakness, rash.  She does endorse nausea and vomiting as well.  In the emergency department started on IV steroids and respiratory treatments due to significant wheeze and respiratory distress on exam.  She has improved some but still not back to baseline therefore will be admitted for ongoing monitoring management.     Interval Followup:   Patient states she is feeling better today  She states she does have oxygen at home, 1 Liter but does not use it all the time  Patient currently is on room air  Afebrile     Review of Systems    All systems reviewed and negative except for what is outlined above.      Objective   Objective     Vitals:   Temp:  [97.6 °F (36.4 °C)-98.2 °F (36.8 °C)] 98.1 °F (36.7 °C)  Heart Rate:  [68-86] 86  Resp:  [18-20] 18  BP: (109-129)/(59-79) 109/59    Physical Exam   General: Awake, alert, NAD resting in bed   HENT: NCAT, MMM  Eyes: pupils equal, no scleral icterus  Cardiovascular: RRR, no murmurs   Pulmonary: decreased. No wheezing   Gastrointestinal: S/ND/NT, +BS  Musculoskeletal: No gross deformities  Skin: No jaundice, no rash on exposed skin appreciated  Neuro: CN II through XII grossly intact; speech clear; no tremor  Psych: Mood  and affect appropriate  : No Lyman catheter; no suprapubic tenderness    Result Review    Result Review:  I have personally reviewed these results:  [x]  Laboratory      Lab 03/24/24  0554 03/23/24  1524   WBC 8.56 7.41   HEMOGLOBIN 12.1 13.8   HEMATOCRIT 37.2 41.6   PLATELETS 411 424   NEUTROS ABS 6.93 4.63   IMMATURE GRANS (ABS) 0.02 0.01   LYMPHS ABS 1.26 1.69   MONOS ABS 0.34 0.65   EOS ABS 0.00 0.35   MCV 87.3 87.2   LACTATE  --  1.7         Lab 03/24/24  0554 03/23/24  1524   SODIUM 137 138   POTASSIUM 4.6 4.6   CHLORIDE 101 99   CO2 25.0 26.5   ANION GAP 11.0 12.5   BUN 10 6*   CREATININE 0.61 0.66   EGFR 101.2 99.3   GLUCOSE 117* 112*   CALCIUM 9.6 9.7         Lab 03/24/24  0554 03/23/24  1524   TOTAL PROTEIN 6.7 7.7   ALBUMIN 4.1 4.7   GLOBULIN 2.6 3.0   ALT (SGPT) 18 24   AST (SGOT) 18 27   BILIRUBIN 0.2 0.3   ALK PHOS 58 70         Lab 03/23/24  1524   PROBNP 128.2   HSTROP T 8                 Brief Urine Lab Results       None          [x]  Microbiology   Microbiology Results (last 10 days)       Procedure Component Value - Date/Time    Respiratory Panel PCR w/COVID-19(SARS-CoV-2) JONATHAN/DOMINICK/MOR/PAD/COR/LOLY In-House, NP Swab in UTM/VTM, 2 HR TAT - Swab, Nasopharynx [[id number]]  (Normal) Collected: 03/24/24 1148    Lab Status: Final result Specimen: Swab from Nasopharynx Updated: 03/24/24 1246     ADENOVIRUS, PCR Not Detected     Coronavirus 229E Not Detected     Coronavirus HKU1 Not Detected     Coronavirus NL63 Not Detected     Coronavirus OC43 Not Detected     COVID19 Not Detected     Human Metapneumovirus Not Detected     Human Rhinovirus/Enterovirus Not Detected     Influenza A PCR Not Detected     Influenza B PCR Not Detected     Parainfluenza Virus 1 Not Detected     Parainfluenza Virus 2 Not Detected     Parainfluenza Virus 3 Not Detected     Parainfluenza Virus 4 Not Detected     RSV, PCR Not Detected     Bordetella pertussis pcr Not Detected     Bordetella parapertussis PCR Not Detected      Chlamydophila pneumoniae PCR Not Detected     Mycoplasma pneumo by PCR Not Detected    Narrative:      In the setting of a positive respiratory panel with a viral infection PLUS a negative procalcitonin without other underlying concern for bacterial infection, consider observing off antibiotics or discontinuation of antibiotics and continue supportive care. If the respiratory panel is positive for atypical bacterial infection (Bordetella pertussis, Chlamydophila pneumoniae, or Mycoplasma pneumoniae), consider antibiotic de-escalation to target atypical bacterial infection.    Respiratory Culture - Sputum, Cough [[id number]] Collected: 03/24/24 0356    Lab Status: Preliminary result Specimen: Sputum from Cough Updated: 03/24/24 0554     Gram Stain Occasional Gram positive bacilli      Moderate (3+) WBCs seen      Few (2+) Epithelial cells seen      Moderate (3+) Gram positive cocci in pairs and chains    Blood Culture - Blood, Arm, Left [[id number]]  (Normal) Collected: 03/23/24 1823    Lab Status: Preliminary result Specimen: Blood from Arm, Left Updated: 03/24/24 1845     Blood Culture No growth at 24 hours    Blood Culture - Blood, Arm, Right [[id number]]  (Normal) Collected: 03/23/24 1823    Lab Status: Preliminary result Specimen: Blood from Arm, Right Updated: 03/24/24 1845     Blood Culture No growth at 24 hours    COVID-19, FLU A/B, RSV PCR 1 HR TAT - Swab, Nasopharynx [[id number]]  (Normal) Collected: 03/23/24 1526    Lab Status: Final result Specimen: Swab from Nasopharynx Updated: 03/23/24 1619     COVID19 Not Detected     Influenza A PCR Not Detected     Influenza B PCR Not Detected     RSV, PCR Not Detected    Narrative:      Fact sheet for providers: https://www.fda.gov/media/083949/download    Fact sheet for patients: https://www.fda.gov/media/396804/download    Test performed by PCR.          [x]  Radiology  XR Chest 1 View    Result Date: 3/23/2024  Impression: Emphysematous changes without evidence of  acute cardiopulmonary abnormality.   Electronically Signed By-Maryuri Navas MD On:3/23/2024 3:35 PM     []  EKG/Telemetry   []  Cardiology/Vascular   []  Pathology  []  Old records  []  Other:    Assessment & Plan   Assessment / Plan     Assessment:  Acute exacerbation of COPD  HTN      Plan:  Continue patient on bronchodilators  Change over to oral steroids for the morning   Continue azithromycin  Respiratory viral panel is negative  Resume home medications      DISPO: CAN LIKELY D/C HOME IN MORNING WITH OUTPATIENT FOLLOW UP   Discussed with RN.    DVT prophylaxis:  Medical DVT prophylaxis orders are present.        CODE STATUS:   Code Status (Patient has no pulse and is not breathing): CPR (Attempt to Resuscitate)  Medical Interventions (Patient has pulse or is breathing): Full Support      Electronically signed by Prabhakar Angel DO, 3/25/2024, 10:28 EDT.

## 2024-03-25 NOTE — PLAN OF CARE
Goal Outcome Evaluation:  Plan of Care Reviewed With: patient        Progress: no change  Outcome Evaluation: Patient presents with no physical limitations that impede her ability to return home independently.  She was encouraged to continue ambulating as tolerated while here at the hospital.  She will be discharged from physical therapy caseload.  Recommend follow-up of pulmonary rehab as outpatient.      Anticipated Discharge Disposition (PT): home, home with assist

## 2024-03-26 ENCOUNTER — READMISSION MANAGEMENT (OUTPATIENT)
Dept: CALL CENTER | Facility: HOSPITAL | Age: 64
End: 2024-03-26
Payer: COMMERCIAL

## 2024-03-26 VITALS
HEART RATE: 78 BPM | DIASTOLIC BLOOD PRESSURE: 96 MMHG | TEMPERATURE: 97.7 F | HEIGHT: 71 IN | SYSTOLIC BLOOD PRESSURE: 142 MMHG | BODY MASS INDEX: 16.14 KG/M2 | OXYGEN SATURATION: 97 % | WEIGHT: 115.3 LBS | RESPIRATION RATE: 18 BRPM

## 2024-03-26 LAB
BACTERIA SPEC RESP CULT: NORMAL
GRAM STN SPEC: NORMAL

## 2024-03-26 PROCEDURE — G0378 HOSPITAL OBSERVATION PER HR: HCPCS

## 2024-03-26 PROCEDURE — 94799 UNLISTED PULMONARY SVC/PX: CPT

## 2024-03-26 PROCEDURE — 99239 HOSP IP/OBS DSCHRG MGMT >30: CPT | Performed by: STUDENT IN AN ORGANIZED HEALTH CARE EDUCATION/TRAINING PROGRAM

## 2024-03-26 PROCEDURE — 63710000001 PREDNISONE PER 1 MG: Performed by: INTERNAL MEDICINE

## 2024-03-26 PROCEDURE — 94664 DEMO&/EVAL PT USE INHALER: CPT

## 2024-03-26 PROCEDURE — 96372 THER/PROPH/DIAG INJ SC/IM: CPT

## 2024-03-26 PROCEDURE — 25010000002 ENOXAPARIN PER 10 MG: Performed by: INTERNAL MEDICINE

## 2024-03-26 RX ORDER — BUDESONIDE AND FORMOTEROL FUMARATE DIHYDRATE 160; 4.5 UG/1; UG/1
2 AEROSOL RESPIRATORY (INHALATION)
Status: DISCONTINUED | OUTPATIENT
Start: 2024-03-26 | End: 2024-03-26 | Stop reason: HOSPADM

## 2024-03-26 RX ORDER — BENZONATATE 100 MG/1
100 CAPSULE ORAL 3 TIMES DAILY PRN
Qty: 15 CAPSULE | Refills: 0 | Status: SHIPPED | OUTPATIENT
Start: 2024-03-26 | End: 2024-04-17

## 2024-03-26 RX ORDER — PREDNISONE 20 MG/1
40 TABLET ORAL
Qty: 4 TABLET | Refills: 0 | Status: SHIPPED | OUTPATIENT
Start: 2024-03-27 | End: 2024-04-17

## 2024-03-26 RX ORDER — AZITHROMYCIN 250 MG/1
250 TABLET, FILM COATED ORAL ONCE
Qty: 1 TABLET | Refills: 0 | Status: DISCONTINUED | OUTPATIENT
Start: 2024-03-26 | End: 2024-03-26 | Stop reason: HOSPADM

## 2024-03-26 RX ADMIN — Medication 1 TABLET: at 10:03

## 2024-03-26 RX ADMIN — ESCITALOPRAM OXALATE 5 MG: 10 TABLET ORAL at 10:03

## 2024-03-26 RX ADMIN — GUAIFENESIN AND DEXTROMETHORPHAN 5 ML: 100; 10 SYRUP ORAL at 01:00

## 2024-03-26 RX ADMIN — IPRATROPIUM BROMIDE AND ALBUTEROL SULFATE 3 ML: .5; 3 SOLUTION RESPIRATORY (INHALATION) at 11:01

## 2024-03-26 RX ADMIN — IPRATROPIUM BROMIDE AND ALBUTEROL SULFATE 3 ML: .5; 3 SOLUTION RESPIRATORY (INHALATION) at 01:08

## 2024-03-26 RX ADMIN — PREDNISONE 40 MG: 20 TABLET ORAL at 10:03

## 2024-03-26 RX ADMIN — ENOXAPARIN SODIUM 40 MG: 100 INJECTION SUBCUTANEOUS at 10:03

## 2024-03-26 RX ADMIN — HYDROCHLOROTHIAZIDE 25 MG: 12.5 TABLET ORAL at 10:03

## 2024-03-26 RX ADMIN — GUAIFENESIN AND DEXTROMETHORPHAN 5 ML: 100; 10 SYRUP ORAL at 12:23

## 2024-03-26 RX ADMIN — BENZOCAINE AND MENTHOL 1 EACH: 15; 3.6 LOZENGE ORAL at 12:23

## 2024-03-26 RX ADMIN — IPRATROPIUM BROMIDE AND ALBUTEROL SULFATE 3 ML: .5; 3 SOLUTION RESPIRATORY (INHALATION) at 07:46
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
## 2024-03-26 NOTE — PLAN OF CARE
Goal Outcome Evaluation:           Progress: no change  Outcome Evaluation: alert and oriented x 4. up ad sumaya. pt discharging home.

## 2024-03-26 NOTE — OUTREACH NOTE
Prep Survey      Flowsheet Row Responses   [facility] facility patient discharged from? [facility]   Is LACE score < 7 ? No   Eligibility Readm Mgmt   Discharge diagnosis COPD exacerbation   Does the patient have one of the following disease processes/diagnoses(primary or secondary)? COPD   Does the patient have Home health ordered? No   Is there a DME ordered? No   Prep survey completed? Yes            Veda WORTHINGTON - Registered Nurse

## 2024-03-27 NOTE — DISCHARGE SUMMARY
[facility]         HOSPITALIST  DISCHARGE SUMMARY    Patient Name: Gloria Sunshine  : 1960  MRN: [id number]    Date of Admission: 3/23/2024  Date of Discharge:  3/26/2024    Primary Care Physician: Anais May APRN    Consults       No orders found from 2024 to 3/24/2024.            Active and Resolved Hospital Problems:  Active Hospital Problems    Diagnosis POA    **COPD exacerbation [J44.1] Yes    COPD with acute exacerbation [J44.1] Yes      Resolved Hospital Problems   No resolved problems to display.       Hospital Course     Hospital Course:  Gloria Sunshine is a 63 y.o. female  past medical history of COPD that presents to the emergency department for evaluation of shortness of breath x 3 weeks that got progressively worse today prompting her to seek further medical attention.      Patient was admitted for an acute COPD exacerbation.  In the emergency department started on IV steroids and respiratory treatments due to significant wheeze and respiratory distress on exam. Her respiratory status improved and she was transitioned to PO steroids to finish her course. Respiratory viral panel was negative.    Patient discharged in stable condition.    DISCHARGE Follow Up Recommendations for labs and diagnostics: Follow up with PCP in one week. Follow up with pulmonology as scheduled.       Day of Discharge     Vital Signs:       Physical Exam:   Gen: NAD, Alert and Oriented  Cards: RRR, no murmur   Pulm: CTA b/l, no wheezing  Abd: soft, nondistended  Extremities: no pitting edema      Discharge Details        Discharge Medications        New Medications        Instructions Start Date   benzonatate 100 MG capsule  Commonly known as: TESSALON   100 mg, Oral, 3 Times Daily PRN      predniSONE 20 MG tablet  Commonly known as: DELTASONE   40 mg, Oral, Daily With Breakfast             Continue These Medications        Instructions Start Date   albuterol (2.5  MG/3ML) 0.083% nebulizer solution  Commonly known as: PROVENTIL   2.5 mg, Nebulization, Every 4 Hours PRN      albuterol sulfate  (90 Base) MCG/ACT inhaler  Commonly known as: PROVENTIL HFA;VENTOLIN HFA;PROAIR HFA   Inhale 2 puffs into the lungs every 4 (four) hours as needed for Shortness of Air.      atorvastatin 20 MG tablet  Commonly known as: LIPITOR   Take 1 tablet by mouth nightly.      budesonide-formoterol 80-4.5 MCG/ACT inhaler  Commonly known as: SYMBICORT   2 puffs, Inhalation, 2 Times Daily      escitalopram 5 MG tablet  Commonly known as: LEXAPRO   Take 1 tablet by mouth daily.      hydroCHLOROthiazide 25 MG tablet   Take 1 tablet by mouth daily.      hydrOXYzine 25 MG tablet  Commonly known as: ATARAX   Take 1 tablet by mouth every 8 (eight) hours as needed for Anxiety.      mirtazapine 7.5 MG tablet  Commonly known as: REMERON   Take 1 tablet by mouth nightly.      multivitamin with minerals tablet tablet   1 tablet, Oral, Daily      Spiriva Respimat 1.25 MCG/ACT aerosol solution inhaler  Generic drug: Tiotropium Bromide Monohydrate   2 puffs, Inhalation, Daily - RT               Allergies   Allergen Reactions    Influenza Vaccines Swelling     Patient got gouter on neck        Discharge Disposition:  Home or Self Care    Diet:  Hospital:No active diet order      Discharge Activity:       CODE STATUS:  Code Status and Medical Interventions:   Ordered at: 03/23/24 1970     Code Status (Patient has no pulse and is not breathing):    CPR (Attempt to Resuscitate)     Medical Interventions (Patient has pulse or is breathing):    Full Support         Future Appointments   Date Time Provider Department Center   4/10/2024  1:15 PM Hema Beck APRN [facility] OBG WTPT [facility]   4/30/2024  8:00 AM Erica Freeman APRN [facility] PCC ETW [facility]   5/1/2024  9:00 AM [facility] PULM LAB ROOM 2 [facility] PFT [facility]   7/16/2024  9:45 AM [place] [facility] [place] [facility]       Additional Instructions for the Follow-ups that You  Need to Schedule       Discharge Follow-up with PCP   As directed       Currently Documented PCP:    Anais May APRN    PCP Phone Number:    [phone]     Follow Up Details: one week        Discharge Follow-up with Specialty: pulmonology   As directed      Specialty: pulmonology   Follow Up Details: pt already scheduled 4/30       Additional information on Labs and Follow-ups:    Follow up With Anais PARKER  [address]  office will call with appt              Pertinent  and/or Most Recent Results         LAB RESULTS:      Lab 03/24/24  0554 03/23/24  1524   WBC 8.56 7.41   HEMOGLOBIN 12.1 13.8   HEMATOCRIT 37.2 41.6   PLATELETS 411 424   NEUTROS ABS 6.93 4.63   IMMATURE GRANS (ABS) 0.02 0.01   LYMPHS ABS 1.26 1.69   MONOS ABS 0.34 0.65   EOS ABS 0.00 0.35   MCV 87.3 87.2   LACTATE  --  1.7         Lab 03/24/24  0554 03/23/24  1524   SODIUM 137 138   POTASSIUM 4.6 4.6   CHLORIDE 101 99   CO2 25.0 26.5   ANION GAP 11.0 12.5   BUN 10 6*   CREATININE 0.61 0.66   EGFR 101.2 99.3   GLUCOSE 117* 112*   CALCIUM 9.6 9.7         Lab 03/24/24  0554 03/23/24  1524   TOTAL PROTEIN 6.7 7.7   ALBUMIN 4.1 4.7   GLOBULIN 2.6 3.0   ALT (SGPT) 18 24   AST (SGOT) 18 27   BILIRUBIN 0.2 0.3   ALK PHOS 58 70         Lab 03/23/24  1524   PROBNP 128.2   HSTROP T 8                 Brief Urine Lab Results       None          Microbiology Results (last 10 days)       Procedure Component Value - Date/Time    Respiratory Panel PCR w/COVID-19(SARS-CoV-2) JONATHAN/DOMINICK/MOR/PAD/COR/LOLY In-House, NP Swab in UTM/VTM, 2 HR TAT - Swab, Nasopharynx [[id number]]  (Normal) Collected: 03/24/24 1148    Lab Status: Final result Specimen: Swab from Nasopharynx Updated: 03/24/24 1246     ADENOVIRUS, PCR Not Detected     Coronavirus 229E Not Detected     Coronavirus HKU1 Not Detected     Coronavirus NL63 Not Detected     Coronavirus OC43 Not Detected     COVID19 Not Detected     Human Metapneumovirus Not Detected     Human Rhinovirus/Enterovirus Not  Detected     Influenza A PCR Not Detected     Influenza B PCR Not Detected     Parainfluenza Virus 1 Not Detected     Parainfluenza Virus 2 Not Detected     Parainfluenza Virus 3 Not Detected     Parainfluenza Virus 4 Not Detected     RSV, PCR Not Detected     Bordetella pertussis pcr Not Detected     Bordetella parapertussis PCR Not Detected     Chlamydophila pneumoniae PCR Not Detected     Mycoplasma pneumo by PCR Not Detected    Narrative:      In the setting of a positive respiratory panel with a viral infection PLUS a negative procalcitonin without other underlying concern for bacterial infection, consider observing off antibiotics or discontinuation of antibiotics and continue supportive care. If the respiratory panel is positive for atypical bacterial infection (Bordetella pertussis, Chlamydophila pneumoniae, or Mycoplasma pneumoniae), consider antibiotic de-escalation to target atypical bacterial infection.    Respiratory Culture - Sputum, Cough [[id number]] Collected: 03/24/24 0356    Lab Status: Final result Specimen: Sputum from Cough Updated: 03/26/24 0911     Respiratory Culture Moderate growth (3+) Normal respiratory alan. No S. aureus or Pseudomonas aeruginosa detected. Final report.     Gram Stain Occasional Gram positive bacilli      Moderate (3+) WBCs seen      Few (2+) Epithelial cells seen      Moderate (3+) Gram positive cocci in pairs and chains    Blood Culture - Blood, Arm, Left [[id number]]  (Normal) Collected: 03/23/24 1823    Lab Status: Preliminary result Specimen: Blood from Arm, Left Updated: 03/26/24 1845     Blood Culture No growth at 3 days    Blood Culture - Blood, Arm, Right [[id number]]  (Normal) Collected: 03/23/24 1823    Lab Status: Preliminary result Specimen: Blood from Arm, Right Updated: 03/26/24 1845     Blood Culture No growth at 3 days    COVID-19, FLU A/B, RSV PCR 1 HR TAT - Swab, Nasopharynx [[id number]]  (Normal) Collected: 03/23/24 1526    Lab Status: Final result  Specimen: Swab from Nasopharynx Updated: 03/23/24 1619     COVID19 Not Detected     Influenza A PCR Not Detected     Influenza B PCR Not Detected     RSV, PCR Not Detected    Narrative:      Fact sheet for providers: https://www.fda.gov/media/230329/download    Fact sheet for patients: https://www.fda.gov/media/687589/download    Test performed by PCR.            XR Chest 1 View    Result Date: 3/23/2024  Impression: Emphysematous changes without evidence of acute cardiopulmonary abnormality.   Electronically Signed By-Maryuri Navas MD On:3/23/2024 3:35 PM                      Time spent on Discharge including face to face service:  >30 minutes    Electronically signed by Lucille Lugo DO, 03/27/24, 1:13 PM EDT.

## 2024-03-28 LAB
BACTERIA SPEC AEROBE CULT: NORMAL
BACTERIA SPEC AEROBE CULT: NORMAL

## 2024-04-03 ENCOUNTER — READMISSION MANAGEMENT (OUTPATIENT)
Dept: CALL CENTER | Facility: HOSPITAL | Age: 64
End: 2024-04-03
Payer: COMMERCIAL

## 2024-04-03 NOTE — OUTREACH NOTE
COPD/PN Week 2 Survey      Flowsheet Row Responses   [facility] patient discharged from? [facility]   Does the patient have one of the following disease processes/diagnoses(primary or secondary)? COPD   Week 2 attempt successful? Yes   Call start time 1358   Call end time 1400   Discharge diagnosis COPD exacerbation   Person spoke with today (if not patient) and relationship spouse   Meds reviewed with patient/caregiver? Yes   Is the patient having any side effects they believe may be caused by any medication additions or changes? No   Does the patient have all medications ordered at discharge? Yes   Is the patient taking all medications as directed (includes completed medication regime)? Yes   Does the patient have a primary care provider?  Yes   Does the patient have an appointment with their PCP or specialist within 7 days of discharge? Yes   Has the patient kept scheduled appointments due by today? Yes   Pulse Ox monitoring Intermittent   Pulse Ox device source Patient   O2 Sat: education provided Sat levels, Monitoring frequency, When to seek care   Psychosocial issues? No   Did the patient receive a copy of their discharge instructions? Yes   Nursing interventions Reviewed instructions with patient   What is the patient's perception of their health status since discharge? Improving   Nursing Interventions Nurse provided patient education   If the patient is a current smoker, are they able to teach back resources for cessation? [id number]   Is the patient/caregiver able to teach back the hierarchy of who to call/visit for symptoms/problems? PCP, Specialist, Home health nurse, Urgent Care, ED, 911 No   Is the patient able to teach back COPD zones? Yes   Nursing interventions Education provided on various zones   Patient reports what zone on this call? Green Zone   Green Zone Reports doing well, Breathing without shortness of breath, Usual amounts of cough and phlegm/mucous, Slept well last night, Appetite  is good   Green Zone interventions: Take daily medications, Use oxygen as prescribed, Continue regular exercise/diet plan, At all times avoid cigarette smoking, vaping and inhaled irritants   Week 2 call completed? Yes   Is the patient interested in additional calls from an ambulatory ? No   Would this patient benefit from a Referral to [facility] Social Work? No   Wrap up additional comments Spouse stated she is doing ok, has seen her PCP has other appts scheduled   Call end time 1400            GUILHERME SANTOS - Registered Nurse

## 2024-04-10 ENCOUNTER — OFFICE VISIT (OUTPATIENT)
Dept: OBSTETRICS AND GYNECOLOGY | Facility: CLINIC | Age: 64
End: 2024-04-10
Payer: COMMERCIAL

## 2024-04-10 VITALS
BODY MASS INDEX: 16.74 KG/M2 | SYSTOLIC BLOOD PRESSURE: 152 MMHG | HEART RATE: 112 BPM | WEIGHT: 120 LBS | DIASTOLIC BLOOD PRESSURE: 90 MMHG

## 2024-04-10 DIAGNOSIS — Z01.419 ENCOUNTER FOR GYNECOLOGICAL EXAMINATION WITHOUT ABNORMAL FINDING: Primary | ICD-10-CM

## 2024-04-10 PROCEDURE — G0123 SCREEN CERV/VAG THIN LAYER: HCPCS | Performed by: NURSE PRACTITIONER

## 2024-04-10 PROCEDURE — 87624 HPV HI-RISK TYP POOLED RSLT: CPT | Performed by: NURSE PRACTITIONER

## 2024-04-10 RX ORDER — BUSPIRONE HYDROCHLORIDE 5 MG/1
5 TABLET ORAL
COMMUNITY
Start: 2023-12-22

## 2024-04-10 RX ORDER — LISINOPRIL 5 MG/1
5 TABLET ORAL
COMMUNITY
Start: 2024-02-27

## 2024-04-10 RX ORDER — CARVEDILOL 6.25 MG/1
6.25 TABLET ORAL
COMMUNITY
Start: 2024-03-01

## 2024-04-10 RX ORDER — CYCLOBENZAPRINE HCL 10 MG
10 TABLET ORAL
COMMUNITY
Start: 2024-03-11

## 2024-04-10 RX ORDER — DONEPEZIL HYDROCHLORIDE 10 MG/1
5 TABLET, FILM COATED ORAL
COMMUNITY
Start: 2024-03-01

## 2024-04-10 RX ORDER — LEVETIRACETAM 750 MG/1
750 TABLET ORAL
COMMUNITY
Start: 2024-02-27

## 2024-04-10 RX ORDER — MEMANTINE HYDROCHLORIDE 10 MG/1
10 TABLET ORAL
COMMUNITY
Start: 2024-02-27

## 2024-04-10 RX ORDER — TRAMADOL HYDROCHLORIDE 50 MG/1
50 TABLET ORAL
COMMUNITY
Start: 2023-11-16

## 2024-04-10 RX ORDER — AMOXICILLIN 250 MG
CAPSULE ORAL
COMMUNITY
Start: 2023-11-20

## 2024-04-10 RX ORDER — LEVOTHYROXINE SODIUM 0.1 MG/1
TABLET ORAL DAILY
COMMUNITY
Start: 2024-01-05

## 2024-04-10 RX ORDER — MONTELUKAST SODIUM 10 MG/1
10 TABLET ORAL DAILY
COMMUNITY
Start: 2024-04-02

## 2024-04-10 RX ORDER — SERTRALINE HYDROCHLORIDE 100 MG/1
100 TABLET, FILM COATED ORAL
COMMUNITY
Start: 2024-02-27

## 2024-04-10 RX ORDER — NICOTINE 21 MG/24HR
1 PATCH, TRANSDERMAL 24 HOURS TRANSDERMAL EVERY 24 HOURS
COMMUNITY
Start: 2024-04-02

## 2024-04-10 RX ORDER — PSEUDOEPHEDRINE HCL 30 MG
100 TABLET ORAL
COMMUNITY
Start: 2024-02-27

## 2024-04-11 ENCOUNTER — READMISSION MANAGEMENT (OUTPATIENT)
Dept: CALL CENTER | Facility: HOSPITAL | Age: 64
End: 2024-04-11
Payer: COMMERCIAL

## 2024-04-11 NOTE — OUTREACH NOTE
COPD/PN Week 3 Survey      Flowsheet Row Responses   [facility] patient discharged from? [facility]   Does the patient have one of the following disease processes/diagnoses(primary or secondary)? COPD   Week 3 attempt successful? Yes   Call start time 1519   Call end time 1529   Discharge diagnosis COPD exacerbation   Meds reviewed with patient/caregiver? Yes   Is the patient taking all medications as directed (includes completed medication regime)? Yes   Comments regarding appointments Pulm appt on 4/30/24   Does the patient have a primary care provider?  Yes   Does the patient have an appointment with their PCP or specialist within 7 days of discharge? Yes   Has the patient kept scheduled appointments due by today? Yes   Has home health visited the patient within 72 hours of discharge? N/A   DME comments home oxygen, wears 2LO2, uses nebulizer BID   Pulse Ox monitoring Intermittent   Pulse Ox device source Patient   O2 Sat comments 85% on RA, -110 with exertion, HR around 60, sats 95% on RA   O2 Sat: education provided Sat levels, Monitoring frequency   Psychosocial issues? No   What is the patient's perception of their health status since discharge? Same  [sob with exertion, productive cough, some chest tightness, occasional wheezing]   Nursing Interventions Nurse provided patient education   If the patient is a current smoker, are they able to teach back resources for cessation? Not a smoker  [Has not smoked since march 22, wears nicotine patches]   Is the patient/caregiver able to teach back the hierarchy of who to call/visit for symptoms/problems? PCP, Specialist, Home health nurse, Urgent Care, ED, 911 Yes   Patient reports what zone on this call? Green Zone   Green Zone Reports doing well  [has some sob with exertion]   Green Zone interventions: Take daily medications   Week 3 call completed? Yes   Graduated Yes   Is the patient interested in additional calls from an ambulatory ? No    Would this patient benefit from a Referral to [facility] Social Work? No   Call end time [phone]            Cindy SALES - Registered Nurse

## 2024-04-11 NOTE — OUTREACH NOTE
COPD/PN Week 3 Survey      Flowsheet Row Responses   [facility] patient discharged from? [facility]   Does the patient have one of the following disease processes/diagnoses(primary or secondary)? COPD   Week 3 attempt successful? Yes   Call start time 1519   Call end time 1529   Discharge diagnosis COPD exacerbation   Meds reviewed with patient/caregiver? Yes   Is the patient taking all medications as directed (includes completed medication regime)? Yes   Comments regarding appointments Pulm appt on 4/30/24   Does the patient have a primary care provider?  Yes   Does the patient have an appointment with their PCP or specialist within 7 days of discharge? Yes   Has the patient kept scheduled appointments due by today? Yes   Has home health visited the patient within 72 hours of discharge? N/A   DME comments home oxygen, wears 2LO2, uses nebulizer BID   Pulse Ox monitoring Intermittent   Pulse Ox device source Patient   O2 Sat comments 85% on RA, -110 with exertion, HR around 60, sats 95% on RA   O2 Sat: education provided Sat levels, Monitoring frequency   Psychosocial issues? No   What is the patient's perception of their health status since discharge? Same  [sob with exertion, productive cough, some chest tightness, occasional wheezing]   Nursing Interventions Nurse provided patient education   If the patient is a current smoker, are they able to teach back resources for cessation? Not a smoker  [stopped smoking since march 22, wears nicotine patches]   Is the patient/caregiver able to teach back the hierarchy of who to call/visit for symptoms/problems? PCP, Specialist, Home health nurse, Urgent Care, ED, 911 Yes   Patient reports what zone on this call? Green Zone   Green Zone Reports doing well  [has some sob with exertion]   Green Zone interventions: Take daily medications   Week 3 call completed? Yes   Graduated Yes   Is the patient interested in additional calls from an ambulatory ? No    Would this patient benefit from a Referral to [facility] Social Work? No   Call end time [phone]            Cindy SALES - Registered Nurse

## 2024-04-15 ENCOUNTER — TELEPHONE (OUTPATIENT)
Dept: OBSTETRICS AND GYNECOLOGY | Facility: CLINIC | Age: 64
End: 2024-04-15
Payer: COMMERCIAL

## 2024-04-15 LAB
CYTOLOGIST CVX/VAG CYTO: NORMAL
CYTOLOGY CVX/VAG DOC CYTO: NORMAL
CYTOLOGY CVX/VAG DOC THIN PREP: NORMAL
DX ICD CODE: NORMAL
HPV I/H RISK 4 DNA CVX QL PROBE+SIG AMP: NEGATIVE
Lab: NORMAL
OTHER STN SPEC: NORMAL
STAT OF ADQ CVX/VAG CYTO-IMP: NORMAL

## 2024-04-15 NOTE — TELEPHONE ENCOUNTER
----- Message from ELENA Plaza sent at 4/15/2024  3:34 PM EDT -----  Please let patient know her pap is negative.

## 2024-04-17 ENCOUNTER — HOSPITAL ENCOUNTER (INPATIENT)
Facility: HOSPITAL | Age: 64
LOS: 5 days | Discharge: HOME | End: 2024-04-23
Attending: EMERGENCY MEDICINE | Admitting: INTERNAL MEDICINE
Payer: COMMERCIAL

## 2024-04-17 ENCOUNTER — APPOINTMENT (OUTPATIENT)
Dept: GENERAL RADIOLOGY | Facility: HOSPITAL | Age: 64
End: 2024-04-17
Payer: COMMERCIAL

## 2024-04-17 DIAGNOSIS — J43.9 PULMONARY EMPHYSEMA, UNSPECIFIED EMPHYSEMA TYPE: ICD-10-CM

## 2024-04-17 DIAGNOSIS — J44.1 COPD WITH ACUTE EXACERBATION: Primary | ICD-10-CM

## 2024-04-17 PROBLEM — J44.9 COPD (CHRONIC OBSTRUCTIVE PULMONARY DISEASE): Status: ACTIVE | Noted: 2024-04-17

## 2024-04-17 LAB
ALBUMIN SERPL-MCNC: 4.6 G/DL (ref 3.5–5.2)
ALBUMIN/GLOB SERPL: 1.8 G/DL
ALP SERPL-CCNC: 66 U/L (ref 39–117)
ALT SERPL W P-5'-P-CCNC: 19 U/L (ref 1–33)
ANION GAP SERPL CALCULATED.3IONS-SCNC: 11.2 MMOL/L (ref 5–15)
AST SERPL-CCNC: 24 U/L (ref 1–32)
BASOPHILS # BLD AUTO: 0.05 10*3/MM3 (ref 0–0.2)
BASOPHILS NFR BLD AUTO: 0.7 % (ref 0–1.5)
BILIRUB SERPL-MCNC: 0.4 MG/DL (ref 0–1.2)
BUN SERPL-MCNC: 12 MG/DL (ref 8–23)
BUN/CREAT SERPL: 19.7 (ref 7–25)
CALCIUM SPEC-SCNC: 9.7 MG/DL (ref 8.6–10.5)
CHLORIDE SERPL-SCNC: 97 MMOL/L (ref 98–107)
CO2 SERPL-SCNC: 27.8 MMOL/L (ref 22–29)
CREAT SERPL-MCNC: 0.61 MG/DL (ref 0.57–1)
D-LACTATE SERPL-SCNC: 1.4 MMOL/L (ref 0.5–2)
DEPRECATED RDW RBC AUTO: 43.6 FL (ref 37–54)
EGFRCR SERPLBLD CKD-EPI 2021: 101.2 ML/MIN/1.73
EOSINOPHIL # BLD AUTO: 0.48 10*3/MM3 (ref 0–0.4)
EOSINOPHIL NFR BLD AUTO: 6.9 % (ref 0.3–6.2)
ERYTHROCYTE [DISTWIDTH] IN BLOOD BY AUTOMATED COUNT: 14.2 % (ref 12.3–15.4)
FLUAV SUBTYP SPEC NAA+PROBE: NOT DETECTED
FLUBV RNA ISLT QL NAA+PROBE: NOT DETECTED
GEN 5 2HR TROPONIN T REFLEX: 7 NG/L
GLOBULIN UR ELPH-MCNC: 2.6 GM/DL
GLUCOSE SERPL-MCNC: 143 MG/DL (ref 65–99)
HCT VFR BLD AUTO: 38.9 % (ref 34–46.6)
HGB BLD-MCNC: 13 G/DL (ref 12–15.9)
HOLD SPECIMEN: NORMAL
HOLD SPECIMEN: NORMAL
IMM GRANULOCYTES # BLD AUTO: 0.02 10*3/MM3 (ref 0–0.05)
IMM GRANULOCYTES NFR BLD AUTO: 0.3 % (ref 0–0.5)
LIPASE SERPL-CCNC: 37 U/L (ref 13–60)
LYMPHOCYTES # BLD AUTO: 1.68 10*3/MM3 (ref 0.7–3.1)
LYMPHOCYTES NFR BLD AUTO: 24.3 % (ref 19.6–45.3)
MAGNESIUM SERPL-MCNC: 2 MG/DL (ref 1.6–2.4)
MCH RBC QN AUTO: 28.4 PG (ref 26.6–33)
MCHC RBC AUTO-ENTMCNC: 33.4 G/DL (ref 31.5–35.7)
MCV RBC AUTO: 85.1 FL (ref 79–97)
MONOCYTES # BLD AUTO: 0.63 10*3/MM3 (ref 0.1–0.9)
MONOCYTES NFR BLD AUTO: 9.1 % (ref 5–12)
NEUTROPHILS NFR BLD AUTO: 4.05 10*3/MM3 (ref 1.7–7)
NEUTROPHILS NFR BLD AUTO: 58.7 % (ref 42.7–76)
NRBC BLD AUTO-RTO: 0 /100 WBC (ref 0–0.2)
NT-PROBNP SERPL-MCNC: 206.2 PG/ML (ref 0–900)
PLATELET # BLD AUTO: 333 10*3/MM3 (ref 140–450)
PMV BLD AUTO: 9.2 FL (ref 6–12)
POTASSIUM SERPL-SCNC: 4.1 MMOL/L (ref 3.5–5.2)
PROT SERPL-MCNC: 7.2 G/DL (ref 6–8.5)
RBC # BLD AUTO: 4.57 10*6/MM3 (ref 3.77–5.28)
RSV RNA NPH QL NAA+NON-PROBE: NOT DETECTED
SARS-COV-2 RNA RESP QL NAA+PROBE: NOT DETECTED
SODIUM SERPL-SCNC: 136 MMOL/L (ref 136–145)
TROPONIN T DELTA: 0 NG/L
TROPONIN T SERPL HS-MCNC: 7 NG/L
WBC NRBC COR # BLD AUTO: 6.91 10*3/MM3 (ref 3.4–10.8)
WHOLE BLOOD HOLD COAG: NORMAL
WHOLE BLOOD HOLD SPECIMEN: NORMAL

## 2024-04-17 PROCEDURE — 94799 UNLISTED PULMONARY SVC/PX: CPT

## 2024-04-17 PROCEDURE — 80053 COMPREHEN METABOLIC PANEL: CPT

## 2024-04-17 PROCEDURE — 87637 SARSCOV2&INF A&B&RSV AMP PRB: CPT | Performed by: EMERGENCY MEDICINE

## 2024-04-17 PROCEDURE — 25010000002 CEFTRIAXONE PER 250 MG: Performed by: INTERNAL MEDICINE

## 2024-04-17 PROCEDURE — 94761 N-INVAS EAR/PLS OXIMETRY MLT: CPT

## 2024-04-17 PROCEDURE — G0378 HOSPITAL OBSERVATION PER HR: HCPCS

## 2024-04-17 PROCEDURE — 93005 ELECTROCARDIOGRAM TRACING: CPT | Performed by: EMERGENCY MEDICINE

## 2024-04-17 PROCEDURE — 93005 ELECTROCARDIOGRAM TRACING: CPT

## 2024-04-17 PROCEDURE — 83605 ASSAY OF LACTIC ACID: CPT | Performed by: INTERNAL MEDICINE

## 2024-04-17 PROCEDURE — 84484 ASSAY OF TROPONIN QUANT: CPT | Performed by: EMERGENCY MEDICINE

## 2024-04-17 PROCEDURE — 83880 ASSAY OF NATRIURETIC PEPTIDE: CPT

## 2024-04-17 PROCEDURE — 25010000002 ONDANSETRON PER 1 MG: Performed by: INTERNAL MEDICINE

## 2024-04-17 PROCEDURE — 85025 COMPLETE CBC W/AUTO DIFF WBC: CPT

## 2024-04-17 PROCEDURE — 83690 ASSAY OF LIPASE: CPT

## 2024-04-17 PROCEDURE — 94664 DEMO&/EVAL PT USE INHALER: CPT

## 2024-04-17 PROCEDURE — 25010000002 ENOXAPARIN PER 10 MG: Performed by: INTERNAL MEDICINE

## 2024-04-17 PROCEDURE — 83735 ASSAY OF MAGNESIUM: CPT

## 2024-04-17 PROCEDURE — 25010000002 METHYLPREDNISOLONE PER 125 MG: Performed by: EMERGENCY MEDICINE

## 2024-04-17 PROCEDURE — 99223 1ST HOSP IP/OBS HIGH 75: CPT | Performed by: INTERNAL MEDICINE

## 2024-04-17 PROCEDURE — 36415 COLL VENOUS BLD VENIPUNCTURE: CPT

## 2024-04-17 PROCEDURE — 71045 X-RAY EXAM CHEST 1 VIEW: CPT

## 2024-04-17 PROCEDURE — 99285 EMERGENCY DEPT VISIT HI MDM: CPT

## 2024-04-17 PROCEDURE — 84484 ASSAY OF TROPONIN QUANT: CPT

## 2024-04-17 PROCEDURE — 94640 AIRWAY INHALATION TREATMENT: CPT

## 2024-04-17 PROCEDURE — 87040 BLOOD CULTURE FOR BACTERIA: CPT | Performed by: INTERNAL MEDICINE

## 2024-04-17 RX ORDER — BISACODYL 10 MG
10 SUPPOSITORY, RECTAL RECTAL DAILY PRN
Status: DISCONTINUED | OUTPATIENT
Start: 2024-04-17 | End: 2024-04-23 | Stop reason: HOSPADM

## 2024-04-17 RX ORDER — ONDANSETRON 2 MG/ML
4 INJECTION INTRAMUSCULAR; INTRAVENOUS EVERY 6 HOURS PRN
Status: DISCONTINUED | OUTPATIENT
Start: 2024-04-17 | End: 2024-04-23 | Stop reason: HOSPADM

## 2024-04-17 RX ORDER — MULTIPLE VITAMINS W/ MINERALS TAB 9MG-400MCG
1 TAB ORAL DAILY
Status: DISCONTINUED | OUTPATIENT
Start: 2024-04-17 | End: 2024-04-23 | Stop reason: HOSPADM

## 2024-04-17 RX ORDER — BISACODYL 5 MG/1
5 TABLET, DELAYED RELEASE ORAL DAILY PRN
Status: DISCONTINUED | OUTPATIENT
Start: 2024-04-17 | End: 2024-04-23 | Stop reason: HOSPADM

## 2024-04-17 RX ORDER — SODIUM CHLORIDE 0.9 % (FLUSH) 0.9 %
10 SYRINGE (ML) INJECTION EVERY 12 HOURS SCHEDULED
Status: DISCONTINUED | OUTPATIENT
Start: 2024-04-17 | End: 2024-04-23 | Stop reason: HOSPADM

## 2024-04-17 RX ORDER — SODIUM CHLORIDE 0.9 % (FLUSH) 0.9 %
10 SYRINGE (ML) INJECTION AS NEEDED
Status: DISCONTINUED | OUTPATIENT
Start: 2024-04-17 | End: 2024-04-23 | Stop reason: HOSPADM

## 2024-04-17 RX ORDER — BUSPIRONE HYDROCHLORIDE 5 MG/1
5 TABLET ORAL EVERY 12 HOURS SCHEDULED
Status: DISCONTINUED | OUTPATIENT
Start: 2024-04-17 | End: 2024-04-17

## 2024-04-17 RX ORDER — FAMOTIDINE 20 MG/1
40 TABLET, FILM COATED ORAL DAILY
Status: DISCONTINUED | OUTPATIENT
Start: 2024-04-17 | End: 2024-04-23 | Stop reason: HOSPADM

## 2024-04-17 RX ORDER — ESCITALOPRAM OXALATE 10 MG/1
5 TABLET ORAL DAILY
Status: DISCONTINUED | OUTPATIENT
Start: 2024-04-17 | End: 2024-04-23 | Stop reason: HOSPADM

## 2024-04-17 RX ORDER — ALBUTEROL SULFATE 2.5 MG/3ML
2.5 SOLUTION RESPIRATORY (INHALATION) EVERY 6 HOURS PRN
Status: DISCONTINUED | OUTPATIENT
Start: 2024-04-17 | End: 2024-04-23 | Stop reason: HOSPADM

## 2024-04-17 RX ORDER — CHOLECALCIFEROL (VITAMIN D3) 125 MCG
5 CAPSULE ORAL NIGHTLY PRN
Status: DISCONTINUED | OUTPATIENT
Start: 2024-04-17 | End: 2024-04-23 | Stop reason: HOSPADM

## 2024-04-17 RX ORDER — IPRATROPIUM BROMIDE AND ALBUTEROL SULFATE 2.5; .5 MG/3ML; MG/3ML
3 SOLUTION RESPIRATORY (INHALATION) ONCE
Status: COMPLETED | OUTPATIENT
Start: 2024-04-17 | End: 2024-04-17

## 2024-04-17 RX ORDER — AMOXICILLIN 250 MG
2 CAPSULE ORAL 2 TIMES DAILY PRN
Status: DISCONTINUED | OUTPATIENT
Start: 2024-04-17 | End: 2024-04-23 | Stop reason: HOSPADM

## 2024-04-17 RX ORDER — CODEINE PHOSPHATE/GUAIFENESIN 10-100MG/5
5 LIQUID (ML) ORAL EVERY 4 HOURS PRN
Status: DISCONTINUED | OUTPATIENT
Start: 2024-04-17 | End: 2024-04-17

## 2024-04-17 RX ORDER — BUDESONIDE 0.5 MG/2ML
0.5 INHALANT ORAL
Status: DISCONTINUED | OUTPATIENT
Start: 2024-04-17 | End: 2024-04-23 | Stop reason: HOSPADM

## 2024-04-17 RX ORDER — ARFORMOTEROL TARTRATE 15 UG/2ML
15 SOLUTION RESPIRATORY (INHALATION)
Status: DISCONTINUED | OUTPATIENT
Start: 2024-04-17 | End: 2024-04-23 | Stop reason: HOSPADM

## 2024-04-17 RX ORDER — ATORVASTATIN CALCIUM 20 MG/1
20 TABLET, FILM COATED ORAL NIGHTLY
Status: DISCONTINUED | OUTPATIENT
Start: 2024-04-17 | End: 2024-04-23 | Stop reason: HOSPADM

## 2024-04-17 RX ORDER — BENZONATATE 100 MG/1
100 CAPSULE ORAL 3 TIMES DAILY PRN
Status: DISCONTINUED | OUTPATIENT
Start: 2024-04-17 | End: 2024-04-23 | Stop reason: HOSPADM

## 2024-04-17 RX ORDER — ASPIRIN 81 MG/1
324 TABLET, CHEWABLE ORAL ONCE
Status: DISCONTINUED | OUTPATIENT
Start: 2024-04-17 | End: 2024-04-23 | Stop reason: HOSPADM

## 2024-04-17 RX ORDER — METHYLPREDNISOLONE SODIUM SUCCINATE 125 MG/2ML
125 INJECTION, POWDER, LYOPHILIZED, FOR SOLUTION INTRAMUSCULAR; INTRAVENOUS ONCE
Status: COMPLETED | OUTPATIENT
Start: 2024-04-17 | End: 2024-04-17

## 2024-04-17 RX ORDER — HYDROXYZINE HYDROCHLORIDE 25 MG/1
25 TABLET, FILM COATED ORAL EVERY 8 HOURS PRN
Status: DISCONTINUED | OUTPATIENT
Start: 2024-04-17 | End: 2024-04-23 | Stop reason: HOSPADM

## 2024-04-17 RX ORDER — ENOXAPARIN SODIUM 100 MG/ML
40 INJECTION SUBCUTANEOUS DAILY
Status: DISCONTINUED | OUTPATIENT
Start: 2024-04-17 | End: 2024-04-23 | Stop reason: HOSPADM

## 2024-04-17 RX ORDER — IPRATROPIUM BROMIDE AND ALBUTEROL SULFATE 2.5; .5 MG/3ML; MG/3ML
3 SOLUTION RESPIRATORY (INHALATION)
Status: DISCONTINUED | OUTPATIENT
Start: 2024-04-17 | End: 2024-04-23 | Stop reason: HOSPADM

## 2024-04-17 RX ORDER — AZITHROMYCIN 250 MG/1
250 TABLET, FILM COATED ORAL
Status: DISCONTINUED | OUTPATIENT
Start: 2024-04-17 | End: 2024-04-23 | Stop reason: HOSPADM

## 2024-04-17 RX ORDER — PREDNISONE 20 MG/1
40 TABLET ORAL
Status: DISCONTINUED | OUTPATIENT
Start: 2024-04-18 | End: 2024-04-23 | Stop reason: HOSPADM

## 2024-04-17 RX ORDER — POLYETHYLENE GLYCOL 3350 17 G/17G
17 POWDER, FOR SOLUTION ORAL DAILY PRN
Status: DISCONTINUED | OUTPATIENT
Start: 2024-04-17 | End: 2024-04-23 | Stop reason: HOSPADM

## 2024-04-17 RX ORDER — SODIUM CHLORIDE 9 MG/ML
40 INJECTION, SOLUTION INTRAVENOUS AS NEEDED
Status: DISCONTINUED | OUTPATIENT
Start: 2024-04-17 | End: 2024-04-23 | Stop reason: HOSPADM

## 2024-04-17 RX ADMIN — ONDANSETRON 4 MG: 2 INJECTION INTRAMUSCULAR; INTRAVENOUS at 19:27

## 2024-04-17 RX ADMIN — IPRATROPIUM BROMIDE AND ALBUTEROL SULFATE 3 ML: .5; 3 SOLUTION RESPIRATORY (INHALATION) at 19:35

## 2024-04-17 RX ADMIN — FAMOTIDINE 40 MG: 20 TABLET ORAL at 17:59

## 2024-04-17 RX ADMIN — BUDESONIDE 0.5 MG: 0.5 SUSPENSION RESPIRATORY (INHALATION) at 19:36

## 2024-04-17 RX ADMIN — Medication 1 TABLET: at 17:59

## 2024-04-17 RX ADMIN — GUAIFENESIN AND CODEINE PHOSPHATE 5 ML: 100; 10 SOLUTION ORAL at 17:59

## 2024-04-17 RX ADMIN — ARFORMOTEROL TARTRATE 15 MCG: 15 SOLUTION RESPIRATORY (INHALATION) at 19:35

## 2024-04-17 RX ADMIN — Medication 5 MG: at 21:58

## 2024-04-17 RX ADMIN — ATORVASTATIN CALCIUM 20 MG: 20 TABLET, FILM COATED ORAL at 21:58

## 2024-04-17 RX ADMIN — IPRATROPIUM BROMIDE AND ALBUTEROL SULFATE 3 ML: .5; 3 SOLUTION RESPIRATORY (INHALATION) at 12:45

## 2024-04-17 RX ADMIN — ESCITALOPRAM OXALATE 5 MG: 10 TABLET ORAL at 17:59

## 2024-04-17 RX ADMIN — CEFTRIAXONE SODIUM 1000 MG: 1 INJECTION, POWDER, FOR SOLUTION INTRAMUSCULAR; INTRAVENOUS at 15:41

## 2024-04-17 RX ADMIN — Medication 10 ML: at 21:58

## 2024-04-17 RX ADMIN — METHYLPREDNISOLONE SODIUM SUCCINATE 125 MG: 125 INJECTION INTRAMUSCULAR; INTRAVENOUS at 13:17

## 2024-04-17 RX ADMIN — ENOXAPARIN SODIUM 40 MG: 100 INJECTION SUBCUTANEOUS at 17:59

## 2024-04-17 RX ADMIN — AZITHROMYCIN DIHYDRATE 250 MG: 250 TABLET ORAL at 17:59

## 2024-04-17 RX ADMIN — BENZONATATE 100 MG: 100 CAPSULE ORAL at 21:58

## 2024-04-17 NOTE — PLAN OF CARE
Problem: Adult Inpatient Plan of Care  Goal: Plan of Care Review  Outcome: Ongoing, Progressing  Flowsheets (Taken 4/17/2024 1810)  Plan of Care Reviewed With: patient  Goal: Patient-Specific Goal (Individualized)  Outcome: Ongoing, Progressing  Goal: Absence of Hospital-Acquired Illness or Injury  Outcome: Ongoing, Progressing  Intervention: Identify and Manage Fall Risk  Recent Flowsheet Documentation  Taken 4/17/2024 1810 by Harriet Neville RN  Safety Promotion/Fall Prevention:   nonskid shoes/slippers when out of bed   safety round/check completed  Taken 4/17/2024 1758 by Harriet Neville RN  Safety Promotion/Fall Prevention:   nonskid shoes/slippers when out of bed   safety round/check completed  Taken 4/17/2024 1729 by Harriet Neville RN  Safety Promotion/Fall Prevention:   nonskid shoes/slippers when out of bed   safety round/check completed  Taken 4/17/2024 1700 by Harriet Neville RN  Safety Promotion/Fall Prevention:   nonskid shoes/slippers when out of bed   safety round/check completed  Taken 4/17/2024 1625 by Harriet Neville RN  Safety Promotion/Fall Prevention:   nonskid shoes/slippers when out of bed   safety round/check completed  Intervention: Prevent Skin Injury  Recent Flowsheet Documentation  Taken 4/17/2024 1625 by Harriet Neville RN  Body Position: position changed independently  Intervention: Prevent and Manage VTE (Venous Thromboembolism) Risk  Recent Flowsheet Documentation  Taken 4/17/2024 1625 by Harriet Neville RN  Activity Management:   activity encouraged   ambulated in room   ambulated to bathroom   back to bed   sitting, edge of bed   up ad sumaya  Intervention: Prevent Infection  Recent Flowsheet Documentation  Taken 4/17/2024 1700 by Harriet Neville RN  Infection Prevention:   hand hygiene promoted   single patient room provided  Taken 4/17/2024 1625 by Harriet Neville RN  Infection Prevention:   hand hygiene promoted   single patient room provided  Goal: Optimal Comfort and Wellbeing  Outcome: Ongoing,  Progressing  Intervention: Monitor Pain and Promote Comfort  Recent Flowsheet Documentation  Taken 4/17/2024 1625 by Harriet Neville, RN  Pain Management Interventions:   relaxation techniques promoted   quiet environment facilitated   position adjusted   pillow support provided   care clustered  Intervention: Provide Person-Centered Care  Recent Flowsheet Documentation  Taken 4/17/2024 1625 by Harriet Neville RN  Trust Relationship/Rapport:   care explained   choices provided   emotional support provided   empathic listening provided   questions answered   questions encouraged   reassurance provided   thoughts/feelings acknowledged  Goal: Readiness for Transition of Care  Outcome: Ongoing, Progressing  Intervention: Mutually Develop Transition Plan  Recent Flowsheet Documentation  Taken 4/17/2024 1713 by Harriet Neville RN  Equipment Currently Used at Home: oxygen  Taken 4/17/2024 1637 by Harriet Neville RN  Transportation Anticipated: family or friend will provide  Patient/Family Anticipated Services at Transition: none  Patient/Family Anticipates Transition to: home with family     Problem: Pain Acute  Goal: Acceptable Pain Control and Functional Ability  Outcome: Ongoing, Progressing  Intervention: Develop Pain Management Plan  Recent Flowsheet Documentation  Taken 4/17/2024 1625 by Harriet Neville RN  Pain Management Interventions:   relaxation techniques promoted   quiet environment facilitated   position adjusted   pillow support provided   care clustered  Intervention: Optimize Psychosocial Wellbeing  Recent Flowsheet Documentation  Taken 4/17/2024 1625 by Harriet Neville, RN  Diversional Activities: television     Problem: Fall Injury Risk  Goal: Absence of Fall and Fall-Related Injury  Outcome: Ongoing, Progressing  Intervention: Promote Injury-Free Environment  Recent Flowsheet Documentation  Taken 4/17/2024 1810 by Harriet Neville, RN  Safety Promotion/Fall Prevention:   nonskid shoes/slippers when out of bed   safety  round/check completed  Taken 4/17/2024 1758 by Harriet Neville RN  Safety Promotion/Fall Prevention:   nonskid shoes/slippers when out of bed   safety round/check completed  Taken 4/17/2024 1729 by Harriet Neville RN  Safety Promotion/Fall Prevention:   nonskid shoes/slippers when out of bed   safety round/check completed  Taken 4/17/2024 1700 by Harriet Neville RN  Safety Promotion/Fall Prevention:   nonskid shoes/slippers when out of bed   safety round/check completed  Taken 4/17/2024 1625 by Harriet Neville RN  Safety Promotion/Fall Prevention:   nonskid shoes/slippers when out of bed   safety round/check completed     Problem: Adjustment to Illness COPD (Chronic Obstructive Pulmonary Disease)  Goal: Optimal Chronic Illness Coping  Outcome: Ongoing, Progressing  Intervention: Support and Optimize Psychosocial Response  Recent Flowsheet Documentation  Taken 4/17/2024 1625 by Harriet Neville RN  Family/Support System Care:   support provided   self-care encouraged     Problem: Functional Ability Impaired COPD (Chronic Obstructive Pulmonary Disease)  Goal: Optimal Level of Functional [place]  Outcome: Ongoing, Progressing  Intervention: Optimize Functional Ability  Recent Flowsheet Documentation  Taken 4/17/2024 1625 by Harriet Neville RN  Activity Management:   activity encouraged   ambulated in room   ambulated to bathroom   back to bed   sitting, edge of bed   up ad sumaya     Problem: Infection COPD (Chronic Obstructive Pulmonary Disease)  Goal: Absence of Infection Signs and Symptoms  Outcome: Ongoing, Progressing     Problem: Oral Intake Inadequate COPD (Chronic Obstructive Pulmonary Disease)  Goal: Improved Nutrition Intake  Outcome: Ongoing, Progressing     Problem: Respiratory Compromise COPD (Chronic Obstructive Pulmonary Disease)  Goal: Effective Oxygenation and Ventilation  Outcome: Ongoing, Progressing  Intervention: Promote Airway Secretion Clearance  Recent Flowsheet Documentation  Taken 4/17/2024 1625 by Kelin  AYUSH Larios  Activity Management:   activity encouraged   ambulated in room   ambulated to bathroom   back to bed   sitting, edge of bed   up ad sumaya  Intervention: Optimize Oxygenation and Ventilation  Recent Flowsheet Documentation  Taken 4/17/2024 1810 by Harriet Neville RN  Head of Bed (HOB) Positioning: HOB elevated  Taken 4/17/2024 1758 by Harriet Neville RN  Head of Bed (HOB) Positioning: HOB elevated  Taken 4/17/2024 1729 by Harriet Neville RN  Head of Bed (HOB) Positioning: HOB elevated  Taken 4/17/2024 1700 by Harriet Neville RN  Head of Bed (HOB) Positioning: HOB elevated  Taken 4/17/2024 1625 by Harriet Neville RN  Head of Bed (HOB) Positioning: HOB elevated   Goal Outcome Evaluation:  Plan of Care Reviewed With: patient      Pt is alert and orient x4.  Pt B/P was increased after using bathroom.  02 dropped to 88 while using the BR, increased 0xygen to 3 lpm,  Pt takes medications whole.  Up at sumaya.

## 2024-04-17 NOTE — Clinical Note
Level of Care: Remote Telemetry [26]   Diagnosis: COPD (chronic obstructive pulmonary disease) [600294]   Admitting Physician: AMA DONIS [[id number]]   Attending Physician: AMA DONIS [[id number]]   Certification: I Certify That Inpatient Hospital Services Are Medically Necessary For Greater Than 2 Midnights

## 2024-04-17 NOTE — CONSULTS
Pulmonary / Critical Care Consult Note      Patient Name: Gloria Sunshine  : 1960  MRN: [id number]  Primary Care Physician:  Anais May APRN  Referring Physician: Rico Vargas MD  Date of admission: 2024    Subjective   Subjective     Reason for Consult/ Chief Complaint:   COPD exacerbation    HPI:  Gloria Sunshine is a 63 y.o. female with history of COPD, chronic hypoxemia on 1 L of oxygen and frequent hospitalizations is here with her similar symptoms.  Reports that every time she stops taking steroids she has worsening symptoms.  Has increasing wheezing, cough with yellow secretions and shortness of breath.  In the emergency department she did have increased work of breathing with O2 saturations in the 80s on her 1 L of oxygen.  She is now on 3 L of oxygen.  She is very frustrated because this is her third hospitalization this year.        Personal History     Past Medical History:   Diagnosis Date    Anxiety     COPD (chronic obstructive pulmonary disease)     Elevated cholesterol     Hyperlipidemia     Hypertension        Past Surgical History:   Procedure Laterality Date    CYST REMOVAL Left     breast    TUBAL ABDOMINAL LIGATION         Family History: family history is not on file. Otherwise pertinent FHx was reviewed and not pertinent to current issue.    Social History:  reports that she has been smoking cigarettes. She has a 11.5 pack-year smoking history. She has never used smokeless tobacco. She reports current alcohol use. She reports that she does not use drugs.    Home Medications:  Tiotropium Bromide Monohydrate, albuterol, albuterol sulfate HFA, atorvastatin, budesonide-formoterol, escitalopram, hydrOXYzine, hydroCHLOROthiazide, mirtazapine, montelukast, and multivitamin with minerals    Allergies:  Allergies   Allergen Reactions    Influenza Vaccines Swelling     Patient got gouter on neck        Objective    Objective     Vitals:   Temp:  [98 °F  (36.7 °C)-98.5 °F (36.9 °C)] 98.4 °F (36.9 °C)  Heart Rate:  [] 98  Resp:  [18-28] 20  BP: (101-177)/() 135/86  Flow (L/min):  [3] 3    Physical Exam:  Vital Signs Reviewed   General:  WDWN, Alert, NAD.    HEENT:  PERRL, EOMI.  OP, nares clear, no sinus tenderness  Neck:  Supple, no JVD, no thyromegaly  Lymph: no axillary, cervical, supraclavicular lymphadenopathy noted bilaterally  Chest:  good aeration, barrel chested, wheezing and rhonchi bilaterally, tympanic to percussion bilaterally, pursed lip breathing noted  CV: RRR, no MGR, pulses 2+, equal.  Abd:  Soft, NT, ND, + BS, no HSM  EXT:  no clubbing, no cyanosis, no edema, no joint tenderness  Neuro:  A&Ox3, CN grossly intact, no focal deficits.  Skin: No rashes or lesions noted      Result Review    Result Review:  I have personally reviewed the results from the time of this admission to 4/18/2024 12:55 EDT and agree with these findings:  [x]  Laboratory  [x]  Microbiology  [x]  Radiology  [x]  EKG/Telemetry   []  Cardiology/Vascular   []  Pathology  [x]  Old records  []  Other:  Most notable findings include:   Viral panel negative  Chest x-ray with hyperexpanded lungs but no acute infiltrates  Last chest CT personally reviewed showing emphysematous changes  She does have significant peripheral eosinophilia  Previous pulmonary office notes and hospital records personally reviewed        Lab 04/18/24  0535 04/17/24  1042   WBC 6.46 6.91   HEMOGLOBIN 11.8* 13.0   HEMATOCRIT 36.9 38.9   PLATELETS 313 333   SODIUM 138 136   POTASSIUM 4.6 4.1   CHLORIDE 100 97*   CO2 28.5 27.8   BUN 9 12   CREATININE 0.57 0.61   GLUCOSE 112* 143*   CALCIUM 9.2 9.7   PHOSPHORUS 3.9  --    TOTAL PROTEIN 6.5 7.2   ALBUMIN 4.3 4.6   GLOBULIN 2.2 2.6         Assessment & Plan   Assessment / Plan     Active Hospital Problems:  Active Hospital Problems    Diagnosis     **COPD (chronic obstructive pulmonary disease)      Impression:  Acute on chronic hypoxemic respiratory  failure  Acute exacerbation of COPD with frequent hospitalizations  Peripheral eosinophilia  Tobacco abuse of cigarettes in remission  Hyperglycemia    Plan:  Given the fact she has had multiple hospitalizations for COPD, she probably needs to be on either Daliresp or chronic daily azithromycin.  Given her low BMI 15.9, I will avoid Daliresp unless absolutely needed given the GI side effects with potential weight loss.  Will start azithromycin 250 mg daily for chronic suppressive therapy  No indication for other antibiotics at this time  Change steroids to prednisone 40 mg daily and do a 2-week taper  Start nebulizers and bronchopulmonary hygiene.  Patient would like to be discharged on nebulizers this time to see if that helps keep her out of the hospital.  Will consult RT  and would like her sent home with Brovana, Pulmicort and Spiriva Respimat 2.5 mcg  Wean O2 to keep SPO2 greater than 90%  Viral panel and culture so far negative.  Procalcitonin negative    DVT prophylaxis:  Medical and mechanical DVT prophylaxis orders are present.      Code Status and Medical Interventions:   Ordered at: 04/17/24 1402     Code Status (Patient has no pulse and is not breathing):    CPR (Attempt to Resuscitate)     Medical Interventions (Patient has pulse or is breathing):    Full Support        Labs, imaging, microbiology, notes and medications personally reviewed  Discussed with primary    Thank you for involving me in the care of this patient.    Electronically signed by Frankie Zheng MD, 04/18/24, 12:58 PM EDT.

## 2024-04-17 NOTE — H&P
[facility]  Date: [facility]   Patient Name: Gloria Sunshine  : 1960  MRN: [id number]  Primary Care Physician:  Anais May APRN  Date of admission: 2024    Subjective   Subjective     Chief Complaint: Shortness of breath    HPI:    Gloria Sunshine is a 63 y.o. female with a past medical history significant for COPD, hyperlipidemia, hypertension, anxiety who presents to the hospital with a several day history of worsening shortness of breath.  Patient was recently in our facility last month for COPD exacerbation.  Patient states she was doing well for several weeks, patient states that on  she started have more shortness of breath, patient did follow-up with the pulmonology clinic who placed her on Singulair with minimal improvement.  Patient presents the emergency department today for worsening respiratory status.      Personal History     Past Medical History:  Past Medical History:   Diagnosis Date    Anxiety     COPD (chronic obstructive pulmonary disease)     Elevated cholesterol     Hyperlipidemia     Hypertension        Past Surgical History:  Past Surgical History:   Procedure Laterality Date    CYST REMOVAL Left     breast    TUBAL ABDOMINAL LIGATION         Family History:   family history is not on file.    Social History:    reports that she has been smoking cigarettes. She has a 11.5 pack-year smoking history. She has never used smokeless tobacco. She reports current alcohol use. She reports that she does not use drugs.    Home Medications:  Tiotropium Bromide Monohydrate, albuterol, albuterol sulfate HFA, atorvastatin, benzonatate, budesonide-formoterol, busPIRone, carvedilol, cyclobenzaprine, docusate sodium, donepezil, escitalopram, hydrOXYzine, hydroCHLOROthiazide, levETIRAcetam, levothyroxine, lisinopril, memantine, mirtazapine, montelukast, multivitamin with minerals, nicotine, predniSONE, sennosides-docusate,  sertraline, and traMADol    Allergies:  Allergies   Allergen Reactions    Influenza Vaccines Swelling     Patient got gouter on neck        Review of Systems:  All systems reviewed and negative other than stated in HPI    Objective   Objective     Vitals:   Temp:  [98 °F (36.7 °C)] 98 °F (36.7 °C)  Heart Rate:  [] 100  Resp:  [18-28] 28  BP: (101-149)/(85-96) 101/85  Flow (L/min):  [3] 3    Physical Exam   GEN: No acute distress  HEENT: Moist mucous membranes  LUNGS: Equal chest rise bilaterally  CARDIAC: Regular rate and rhythm  NEURO: Moving all 4 extremities spontaneously  SKIN: No obvious breakdown    Result Review:  I have personally reviewed the results from the time of this admission to 4/17/2024 14:03 EDT and agree with these findings:  [x]  Laboratory  CMP          3/23/2024    15:24 3/24/2024    05:54 4/17/2024    10:42   CMP   Glucose 112  117  143    BUN 6  10  12    Creatinine 0.66  0.61  0.61    EGFR 99.3  101.2  101.2    Sodium 138  137  136    Potassium 4.6  4.6  4.1    Chloride 99  101  97    Calcium 9.7  9.6  9.7    Total Protein 7.7  6.7  7.2    Albumin 4.7  4.1  4.6    Globulin 3.0  2.6  2.6    Total Bilirubin 0.3  0.2  0.4    Alkaline Phosphatase 70  58  66    AST (SGOT) 27  18  24    ALT (SGPT) 24  18  19    Albumin/Globulin Ratio 1.6  1.6  1.8    BUN/Creatinine Ratio 9.1  16.4  19.7    Anion Gap 12.5  11.0  11.2      CBC          3/23/2024    15:24 3/24/2024    05:54 4/17/2024    10:42   CBC   WBC 7.41  8.56  6.91    RBC 4.77  4.26  4.57    Hemoglobin 13.8  12.1  13.0    Hematocrit 41.6  37.2  38.9    MCV 87.2  87.3  85.1    MCH 28.9  28.4  28.4    MCHC 33.2  32.5  33.4    RDW 14.1  13.8  14.2    Platelets 424  411  333      []  Microbiology  []  Radiology  []  EKG/Telemetry   []  Cardiology/Vascular   []  Pathology  []  Old records  []  Other:      Assessment & Plan   Assessment / Plan     Assessment:   COPD with acute  exacerbation  Anxiety  Hyperlipidemia  Hypertension  Hypothyroidism    Plan:  Admit patient to the hospital for further workup and management of above  Start bronchodilators, Brovana, Pulmicort, DuoNebs, as needed albuterol  COVID-19, RSV and influenza negative  Start prednisone  Hold home antihypertensives, resume as needed  Resume home anxiety lytics  Check extended viral respiratory panel  Start Rocephin, azithromycin  Chest x-ray personally reviewed, no dense infiltrate noted  Consult pulmonology given multiple hospitalizations for COPD  Wean O2 for SpO2 greater than 88%  CBC, CMP reviewed  Repeat CBC, CMP, mag and Phos in a.m.  Patient would likely benefit from 5 days of prednisone to store at home when she starts to have exacerbations, this would likely keep her out of the hospital.    Reviewed patients labs and imaging, and discussed with patient and nurse at bedside, discussed with emergency room physician    CODE STATUS:    Code Status (Patient has no pulse and is not breathing): CPR (Attempt to Resuscitate)  Medical Interventions (Patient has pulse or is breathing): Full Support      Admission Status:  I believe this patient meets inpatient at status.      Electronically signed by Rico Vargas MD, 04/17/24, 2:07 PM EDT.

## 2024-04-17 NOTE — ED PROVIDER NOTES
Time: 12:32 PM EDT  Date of encounter:  4/17/2024  Independent Historian/Clinical History and Information was obtained by:   Patient    History is limited by: N/A    Chief Complaint: Shortness of breath      History of Present Illness:  Patient is a 63 y.o. year old female who presents to the emergency department for evaluation of shortness of breath and chest pain.  Patient has a history of COPD and is on oxygen as needed.  States that over the last few days she has had increasing work of breathing as well has had to be on oxygen.  Has been using 3 to 4 L.  Does report that she has a history of COPD and is not normally on oxygen but has had to be out of the last couple days.  Also ports a fever of the last few days.  No other complaints this time.    HPI    Patient Care Team  Primary Care Provider: Anais May APRN    Past Medical History:     Allergies   Allergen Reactions    Influenza Vaccines Swelling     Patient got gouter on neck      Past Medical History:   Diagnosis Date    Anxiety     COPD (chronic obstructive pulmonary disease)     Elevated cholesterol     Hyperlipidemia     Hypertension      Past Surgical History:   Procedure Laterality Date    CYST REMOVAL Left     breast    TUBAL ABDOMINAL LIGATION       Family History   Problem Relation Age of Onset    Breast cancer Neg Hx     Ovarian cancer Neg Hx     Uterine cancer Neg Hx     Colon cancer Neg Hx     Melanoma Neg Hx        Home Medications:  Prior to Admission medications    Medication Sig Start Date End Date Taking? Authorizing Provider   albuterol (PROVENTIL) (2.5 MG/3ML) 0.083% nebulizer solution Take 2.5 mg by nebulization Every 4 (Four) Hours As Needed for Wheezing or Shortness of Air. 1/30/24   Lucille Lugo DO   albuterol sulfate  (90 Base) MCG/ACT inhaler Inhale 2 puffs into the lungs every 4 (four) hours as needed for Shortness of Air. 2/29/24   Erica Freeman APRN   atorvastatin (LIPITOR) 20 MG tablet Take 1 tablet by  mouth nightly. 1/18/24      benzonatate (TESSALON) 100 MG capsule Take 1 capsule by mouth 3 (Three) Times a Day As Needed for Cough. 3/26/24   Lucille Lugo DO   budesonide-formoterol (SYMBICORT) 80-4.5 MCG/ACT inhaler Inhale 2 puffs 2 (Two) Times a Day. 2/29/24   Erica Freeman APRN   busPIRone (BUSPAR) 5 MG tablet 1 tablet. 12/22/23   Steven Lawrence MD   carvedilol (COREG) 6.25 MG tablet 1 tablet. 3/1/24   Steven Lawrence MD   cyclobenzaprine (FLEXERIL) 10 MG tablet 1 tablet. 3/11/24   Steven Lawrence MD   docusate sodium 100 MG capsule 1 capsule. 2/27/24   Steven Lawrence MD   donepezil (ARICEPT) 10 MG tablet 0.5 tablets. 3/1/24   Steven Lawrence MD   escitalopram (LEXAPRO) 5 MG tablet Take 1 tablet by mouth daily. 11/6/23      hydroCHLOROthiazide 25 MG tablet Take 1 tablet by mouth daily. 11/6/23      hydrOXYzine (ATARAX) 25 MG tablet Take 1 tablet by mouth every 8 (eight) hours as needed for Anxiety. 11/6/23      levETIRAcetam (KEPPRA) 750 MG tablet 1 tablet. 2/27/24   ProviderSteven MD   levothyroxine (SYNTHROID, LEVOTHROID) 100 MCG tablet Daily. 1/5/24   ProviderSteven MD   lisinopril (PRINIVIL,ZESTRIL) 5 MG tablet 1 tablet. 2/27/24   Steven Lawrence MD   memantine (NAMENDA) 10 MG tablet 1 tablet. 2/27/24   ProviderSteven MD   mirtazapine (REMERON) 7.5 MG tablet Take 1 tablet by mouth nightly. 11/6/23      montelukast (SINGULAIR) 10 MG tablet Take 1 tablet by mouth Daily. 4/2/24   Provider, MD Steven   multivitamin with minerals tablet tablet Take 1 tablet by mouth Daily.    Provider, Historical, MD   nicotine (NICODERM CQ) 14 MG/24HR patch Place 1 patch on the skin as directed by provider Daily. 4/2/24   Provider, MD Steven   predniSONE (DELTASONE) 20 MG tablet Take 2 tablets by mouth Daily With Breakfast. 3/27/24   Lucille Lugo DO sennoabel-docusate (PERICOLACE) 8.6-50 MG per tablet Every Day At Bedtime 11/20/23    "Provider, MD Steven   sertraline (ZOLOFT) 100 MG tablet 1 tablet. 2/27/24   ProviderSteven MD   Tiotropium Bromide Monohydrate (Spiriva Respimat) 1.25 MCG/ACT aerosol solution inhaler Inhale 2 puffs Daily. 2/29/24   Erica Freeman APRN   traMADol (ULTRAM) 50 MG tablet 1 tablet. 11/16/23   ProviderSteven MD        Social History:   Social History     Tobacco Use    Smoking status: Every Day     Current packs/day: 0.25     Average packs/day: 0.3 packs/day for 46.0 years (11.5 ttl pk-yrs)     Types: Cigarettes    Smokeless tobacco: Never    Tobacco comments:     Down to 3 cigs a day   Vaping Use    Vaping status: Never Used   Substance Use Topics    Alcohol use: Yes     Comment: occasionally    Drug use: Never         Review of Systems:  Review of Systems   Constitutional:  Positive for fever.   Respiratory:  Positive for chest tightness and shortness of breath.         Physical Exam:  /85   Pulse 100   Temp 98 °F (36.7 °C) (Oral)   Resp 28   Ht 180.3 cm (71\")   Wt 55 kg (121 lb 4.1 oz)   SpO2 98%   BMI 16.91 kg/m²     Physical Exam  Vitals and nursing note reviewed.   Constitutional:       Appearance: Normal appearance.   HENT:      Head: Normocephalic and atraumatic.   Eyes:      General: No scleral icterus.  Cardiovascular:      Rate and Rhythm: Normal rate and regular rhythm.      Heart sounds: Normal heart sounds.   Pulmonary:      Effort: Respiratory distress present.      Breath sounds: Wheezing present.      Comments: Decreased breath sounds bilaterally with mild to moderate respiratory distress with wheezes.  Patient sitting with her elbows on the bench in the room and sitting upright.  Abdominal:      Palpations: Abdomen is soft.      Tenderness: There is no abdominal tenderness.   Musculoskeletal:         General: Normal range of motion.      Cervical back: Normal range of motion.   Skin:     Findings: No rash.   Neurological:      General: No focal deficit present.      Mental " Status: She is alert.                  Procedures:  Procedures      Medical Decision Making:      Comorbidities that affect care:    COPD    External Notes reviewed:    Reviewed admission from 3/23/2024      The following orders were placed and all results were independently analyzed by me:  Orders Placed This Encounter   Procedures    COVID-19, FLU A/B, RSV PCR 1 HR TAT - Swab, Nasopharynx    XR Chest 1 View    [place] Draw    High Sensitivity Troponin T    Comprehensive Metabolic Panel    Lipase    BNP    Magnesium    CBC Auto Differential    High Sensitivity Troponin T 2Hr    NPO Diet NPO Type: Strict NPO    Undress & Gown    Continuous Pulse Oximetry    Inpatient Hospitalist Consult    Oxygen Therapy- Nasal Cannula; Titrate 1-6 LPM Per SpO2; 90 - 95%    ECG 12 Lead ED Triage Standing Order; Chest Pain    ECG 12 Lead ED Triage Standing Order; Chest Pain    Insert Peripheral IV    CBC & Differential    Green Top (Gel)    Lavender Top    Gold Top - SST    Light Blue Top       Medications Given in the Emergency Department:  Medications   sodium chloride 0.9 % flush 10 mL (has no administration in time range)   aspirin chewable tablet 324 mg (324 mg Oral Not Given 4/17/24 1318)   ipratropium-albuterol (DUO-NEB) nebulizer solution 3 mL (3 mL Nebulization Given 4/17/24 1245)   methylPREDNISolone sodium succinate (SOLU-Medrol) injection 125 mg (125 mg Intravenous Given 4/17/24 1317)        ED Course:    ED Course as of 04/17/24 1400   Wed Apr 17, 2024   1359 Spoke with Dr. Vargas who agrees to admit [MA]      ED Course User Index  [MA] Yan Felipe MD       Labs:    Lab Results (last 24 hours)       Procedure Component Value Units Date/Time    High Sensitivity Troponin T [[id number]]  (Normal) Collected: 04/17/24 1042    Specimen: Blood from Arm, Right Updated: 04/17/24 1118     HS Troponin T 7 ng/L     Narrative:      High Sensitive Troponin T Reference Range:  <14.0 ng/L- Negative Female for AMI  <22.0 ng/L-  Negative Male for AMI  >=14 - Abnormal Female indicating possible myocardial injury.  >=22 - Abnormal Male indicating possible myocardial injury.   Clinicians would have to utilize clinical acumen, EKG, Troponin, and serial changes to determine if it is an Acute Myocardial Infarction or myocardial injury due to an underlying chronic condition.         CBC & Differential [[id number]]  (Abnormal) Collected: 04/17/24 1042    Specimen: Blood from Arm, Right Updated: 04/17/24 1056    Narrative:      The following orders were created for panel order CBC & Differential.  Procedure                               Abnormality         Status                     ---------                               -----------         ------                     CBC Auto Differential[[id number]]        Abnormal            Final result                 Please view results for these tests on the individual orders.    Comprehensive Metabolic Panel [[id number]]  (Abnormal) Collected: 04/17/24 1042    Specimen: Blood from Arm, Right Updated: 04/17/24 1120     Glucose 143 mg/dL      BUN 12 mg/dL      Creatinine 0.61 mg/dL      Sodium 136 mmol/L      Potassium 4.1 mmol/L      Comment: Slight hemolysis detected by analyzer. Result may be falsely elevated.        Chloride 97 mmol/L      CO2 27.8 mmol/L      Calcium 9.7 mg/dL      Total Protein 7.2 g/dL      Albumin 4.6 g/dL      ALT (SGPT) 19 U/L      AST (SGOT) 24 U/L      Comment: Slight hemolysis detected by analyzer. Result may be falsely elevated.        Alkaline Phosphatase 66 U/L      Total Bilirubin 0.4 mg/dL      Globulin 2.6 gm/dL      A/G Ratio 1.8 g/dL      BUN/Creatinine Ratio 19.7     Anion Gap 11.2 mmol/L      eGFR 101.2 mL/min/1.73     Narrative:      GFR Normal >60  Chronic Kidney Disease <60  Kidney Failure <15      Lipase [[id number]]  (Normal) Collected: 04/17/24 1042    Specimen: Blood from Arm, Right Updated: 04/17/24 1118     Lipase 37 U/L     BNP [[id number]]  (Normal) Collected:  04/17/24 1042    Specimen: Blood from Arm, Right Updated: 04/17/24 1114     proBNP 206.2 pg/mL     Narrative:      This assay is used as an aid in the diagnosis of individuals suspected of having heart failure. It can be used as an aid in the diagnosis of acute decompensated heart failure (ADHF) in patients presenting with signs and symptoms of ADHF to the emergency department (ED). In addition, NT-proBNP of <300 pg/mL indicates ADHF is not likely.    Age Range Result Interpretation  NT-proBNP Concentration (pg/mL:      <50             Positive            >450                   Gray                 300-450                    Negative             <300    50-75           Positive            >900                  Gray                300-900                  Negative            <300      >75             Positive            >1800                  Gray                300-1800                  Negative            <300    Magnesium [[id number]]  (Normal) Collected: 04/17/24 1042    Specimen: Blood from Arm, Right Updated: 04/17/24 1120     Magnesium 2.0 mg/dL     CBC Auto Differential [[id number]]  (Abnormal) Collected: 04/17/24 1042    Specimen: Blood from Arm, Right Updated: 04/17/24 1056     WBC 6.91 10*3/mm3      RBC 4.57 10*6/mm3      Hemoglobin 13.0 g/dL      Hematocrit 38.9 %      MCV 85.1 fL      MCH 28.4 pg      MCHC 33.4 g/dL      RDW 14.2 %      RDW-SD 43.6 fl      MPV 9.2 fL      Platelets 333 10*3/mm3      Neutrophil % 58.7 %      Lymphocyte % 24.3 %      Monocyte % 9.1 %      Eosinophil % 6.9 %      Basophil % 0.7 %      Immature Grans % 0.3 %      Neutrophils, Absolute 4.05 10*3/mm3      Lymphocytes, Absolute 1.68 10*3/mm3      Monocytes, Absolute 0.63 10*3/mm3      Eosinophils, Absolute 0.48 10*3/mm3      Basophils, Absolute 0.05 10*3/mm3      Immature Grans, Absolute 0.02 10*3/mm3      nRBC 0.0 /100 WBC     COVID-19, FLU A/B, RSV PCR 1 HR TAT - Swab, Nasopharynx [[id number]]  (Normal) Collected: 04/17/24 1050     Specimen: Swab from Nasopharynx Updated: 04/17/24 1140     COVID19 Not Detected     Influenza A PCR Not Detected     Influenza B PCR Not Detected     RSV, PCR Not Detected    Narrative:      Fact sheet for providers: https://www.fda.gov/media/944297/download    Fact sheet for patients: https://www.fda.gov/media/208392/download    Test performed by PCR.             Imaging:    XR Chest 1 View    Result Date: 4/17/2024   DATE OF EXAM: 4/17/2024 10:50 AM  PROCEDURE: XR CHEST 1 VW-  INDICATIONS: Chest Pain Triage Protocol  COMPARISON: 3/23/2024 and prior  TECHNIQUE: Portable Chest  FINDINGS:  Lungs are hyperexpanded with emphysematous changes again noted with an upper lung zone predominance. Heart mediastinal contours appear stable with prominence of the central pulmonary vascularity. No focal consolidation noted. Blunting at the costophrenic angle is favored to represent sequela of hyperinflation. No focal consolidation noted. No acute osseous abnormality      IMPRESSION : Hyperexpansion of the lungs, emphysema.  No focal consolidation [  Electronically Signed By-Carloz Smith On:4/17/2024 11:04 AM         Differential Diagnosis and Discussion:    Dyspnea: Differential diagnosis includes but is not limited to metabolic acidosis, neurological disorders, psychogenic, asthma, pneumothorax, upper airway obstruction, COPD, pneumonia, noncardiogenic pulmonary edema, interstitial lung disease, anemia, congestive heart failure, and pulmonary embolism    All labs were reviewed and interpreted by me.  All X-rays impressions were independently interpreted by me.  EKG was interpreted by me.    MDM     Patient is a 63-year-old female who presents with complaints of shortness of breath.  Has a history of COPD and presents with worsening shortness of breath.  Has had to use oxygen recently.  On exam she has decreased breath sounds.  Has had some improvement with steroids and nebs but is still very tight on exam.  Discussed  needing admission due to new oxygen use recently as well as worsening shortness of breath.  Will admit for further workup.          Patient Care Considerations:          Consultants/Shared Management Plan:    Hospitalist: I have discussed the case with Dr. Vargas who agrees to accept the patient for admission.    Social Determinants of Health:    Patient is independent, reliable, and has access to care.       Disposition and Care Coordination:    Admit:   Through independent evaluation of the patient's history, physical, and imperical data, the patient meets criteria for inpatient admission to the hospital.        Final diagnoses:   COPD with acute exacerbation        ED Disposition       ED Disposition   Decision to Admit    Condition   --    Comment   --               This medical record created using voice recognition software.             Yan Felipe MD  04/17/24 4001

## 2024-04-18 LAB
ALBUMIN SERPL-MCNC: 4.3 G/DL (ref 3.5–5.2)
ALBUMIN/GLOB SERPL: 2 G/DL
ALP SERPL-CCNC: 58 U/L (ref 39–117)
ALT SERPL W P-5'-P-CCNC: 17 U/L (ref 1–33)
ANION GAP SERPL CALCULATED.3IONS-SCNC: 9.5 MMOL/L (ref 5–15)
AST SERPL-CCNC: 19 U/L (ref 1–32)
B PARAPERT DNA SPEC QL NAA+PROBE: NOT DETECTED
B PERT DNA SPEC QL NAA+PROBE: NOT DETECTED
BASOPHILS # BLD AUTO: 0.02 10*3/MM3 (ref 0–0.2)
BASOPHILS NFR BLD AUTO: 0.3 % (ref 0–1.5)
BILIRUB SERPL-MCNC: 0.3 MG/DL (ref 0–1.2)
BUN SERPL-MCNC: 9 MG/DL (ref 8–23)
BUN/CREAT SERPL: 15.8 (ref 7–25)
C PNEUM DNA NPH QL NAA+NON-PROBE: NOT DETECTED
CALCIUM SPEC-SCNC: 9.2 MG/DL (ref 8.6–10.5)
CHLORIDE SERPL-SCNC: 100 MMOL/L (ref 98–107)
CO2 SERPL-SCNC: 28.5 MMOL/L (ref 22–29)
CREAT SERPL-MCNC: 0.57 MG/DL (ref 0.57–1)
DEPRECATED RDW RBC AUTO: 44.2 FL (ref 37–54)
EGFRCR SERPLBLD CKD-EPI 2021: 102.9 ML/MIN/1.73
EOSINOPHIL # BLD AUTO: 0.01 10*3/MM3 (ref 0–0.4)
EOSINOPHIL NFR BLD AUTO: 0.2 % (ref 0.3–6.2)
ERYTHROCYTE [DISTWIDTH] IN BLOOD BY AUTOMATED COUNT: 14 % (ref 12.3–15.4)
FLUAV SUBTYP SPEC NAA+PROBE: NOT DETECTED
FLUBV RNA ISLT QL NAA+PROBE: NOT DETECTED
GLOBULIN UR ELPH-MCNC: 2.2 GM/DL
GLUCOSE SERPL-MCNC: 112 MG/DL (ref 65–99)
HADV DNA SPEC NAA+PROBE: NOT DETECTED
HCOV 229E RNA SPEC QL NAA+PROBE: NOT DETECTED
HCOV HKU1 RNA SPEC QL NAA+PROBE: NOT DETECTED
HCOV NL63 RNA SPEC QL NAA+PROBE: NOT DETECTED
HCOV OC43 RNA SPEC QL NAA+PROBE: NOT DETECTED
HCT VFR BLD AUTO: 36.9 % (ref 34–46.6)
HGB BLD-MCNC: 11.8 G/DL (ref 12–15.9)
HMPV RNA NPH QL NAA+NON-PROBE: NOT DETECTED
HPIV1 RNA ISLT QL NAA+PROBE: NOT DETECTED
HPIV2 RNA SPEC QL NAA+PROBE: NOT DETECTED
HPIV3 RNA NPH QL NAA+PROBE: NOT DETECTED
HPIV4 P GENE NPH QL NAA+PROBE: NOT DETECTED
IMM GRANULOCYTES # BLD AUTO: 0.02 10*3/MM3 (ref 0–0.05)
IMM GRANULOCYTES NFR BLD AUTO: 0.3 % (ref 0–0.5)
LYMPHOCYTES # BLD AUTO: 1.8 10*3/MM3 (ref 0.7–3.1)
LYMPHOCYTES NFR BLD AUTO: 27.9 % (ref 19.6–45.3)
M PNEUMO IGG SER IA-ACNC: NOT DETECTED
MAGNESIUM SERPL-MCNC: 2.1 MG/DL (ref 1.6–2.4)
MCH RBC QN AUTO: 27.6 PG (ref 26.6–33)
MCHC RBC AUTO-ENTMCNC: 32 G/DL (ref 31.5–35.7)
MCV RBC AUTO: 86.2 FL (ref 79–97)
MONOCYTES # BLD AUTO: 0.88 10*3/MM3 (ref 0.1–0.9)
MONOCYTES NFR BLD AUTO: 13.6 % (ref 5–12)
NEUTROPHILS NFR BLD AUTO: 3.73 10*3/MM3 (ref 1.7–7)
NEUTROPHILS NFR BLD AUTO: 57.7 % (ref 42.7–76)
NRBC BLD AUTO-RTO: 0 /100 WBC (ref 0–0.2)
PHOSPHATE SERPL-MCNC: 3.9 MG/DL (ref 2.5–4.5)
PLATELET # BLD AUTO: 313 10*3/MM3 (ref 140–450)
PMV BLD AUTO: 9.3 FL (ref 6–12)
POTASSIUM SERPL-SCNC: 4.6 MMOL/L (ref 3.5–5.2)
PROT SERPL-MCNC: 6.5 G/DL (ref 6–8.5)
RBC # BLD AUTO: 4.28 10*6/MM3 (ref 3.77–5.28)
RHINOVIRUS RNA SPEC NAA+PROBE: NOT DETECTED
RSV RNA NPH QL NAA+NON-PROBE: NOT DETECTED
SARS-COV-2 RNA RESP QL NAA+PROBE: NOT DETECTED
SODIUM SERPL-SCNC: 138 MMOL/L (ref 136–145)
WBC NRBC COR # BLD AUTO: 6.46 10*3/MM3 (ref 3.4–10.8)

## 2024-04-18 PROCEDURE — 94799 UNLISTED PULMONARY SVC/PX: CPT

## 2024-04-18 PROCEDURE — 99232 SBSQ HOSP IP/OBS MODERATE 35: CPT | Performed by: INTERNAL MEDICINE

## 2024-04-18 PROCEDURE — 99223 1ST HOSP IP/OBS HIGH 75: CPT | Performed by: INTERNAL MEDICINE

## 2024-04-18 PROCEDURE — 63710000001 PREDNISONE PER 1 MG: Performed by: INTERNAL MEDICINE

## 2024-04-18 PROCEDURE — 80053 COMPREHEN METABOLIC PANEL: CPT | Performed by: INTERNAL MEDICINE

## 2024-04-18 PROCEDURE — 94664 DEMO&/EVAL PT USE INHALER: CPT

## 2024-04-18 PROCEDURE — 84100 ASSAY OF PHOSPHORUS: CPT | Performed by: INTERNAL MEDICINE

## 2024-04-18 PROCEDURE — G0378 HOSPITAL OBSERVATION PER HR: HCPCS

## 2024-04-18 PROCEDURE — 0202U NFCT DS 22 TRGT SARS-COV-2: CPT | Performed by: INTERNAL MEDICINE

## 2024-04-18 PROCEDURE — 85025 COMPLETE CBC W/AUTO DIFF WBC: CPT | Performed by: INTERNAL MEDICINE

## 2024-04-18 PROCEDURE — 25010000002 ENOXAPARIN PER 10 MG: Performed by: INTERNAL MEDICINE

## 2024-04-18 PROCEDURE — 83735 ASSAY OF MAGNESIUM: CPT | Performed by: INTERNAL MEDICINE

## 2024-04-18 PROCEDURE — 94760 N-INVAS EAR/PLS OXIMETRY 1: CPT

## 2024-04-18 PROCEDURE — 25010000002 ONDANSETRON PER 1 MG: Performed by: INTERNAL MEDICINE

## 2024-04-18 RX ORDER — MIRTAZAPINE 15 MG/1
7.5 TABLET, FILM COATED ORAL NIGHTLY
Status: DISCONTINUED | OUTPATIENT
Start: 2024-04-18 | End: 2024-04-23 | Stop reason: HOSPADM

## 2024-04-18 RX ORDER — HYDROCODONE POLISTIREX AND CHLORPHENIRAMINE POLISTIREX 10; 8 MG/5ML; MG/5ML
5 SUSPENSION, EXTENDED RELEASE ORAL EVERY 12 HOURS PRN
Status: DISCONTINUED | OUTPATIENT
Start: 2024-04-18 | End: 2024-04-23 | Stop reason: HOSPADM

## 2024-04-18 RX ADMIN — Medication 10 ML: at 08:44

## 2024-04-18 RX ADMIN — BUDESONIDE 0.5 MG: 0.5 SUSPENSION RESPIRATORY (INHALATION) at 08:06

## 2024-04-18 RX ADMIN — AZITHROMYCIN DIHYDRATE 250 MG: 250 TABLET ORAL at 08:44

## 2024-04-18 RX ADMIN — BENZONATATE 100 MG: 100 CAPSULE ORAL at 06:00

## 2024-04-18 RX ADMIN — PREDNISONE 40 MG: 20 TABLET ORAL at 08:44

## 2024-04-18 RX ADMIN — MIRTAZAPINE 7.5 MG: 15 TABLET, FILM COATED ORAL at 21:17

## 2024-04-18 RX ADMIN — Medication 10 ML: at 20:13

## 2024-04-18 RX ADMIN — Medication 5 MG: at 21:17

## 2024-04-18 RX ADMIN — ONDANSETRON 4 MG: 2 INJECTION INTRAMUSCULAR; INTRAVENOUS at 10:03

## 2024-04-18 RX ADMIN — ARFORMOTEROL TARTRATE 15 MCG: 15 SOLUTION RESPIRATORY (INHALATION) at 08:06

## 2024-04-18 RX ADMIN — BUDESONIDE 0.5 MG: 0.5 SUSPENSION RESPIRATORY (INHALATION) at 18:38

## 2024-04-18 RX ADMIN — Medication 1 TABLET: at 08:44

## 2024-04-18 RX ADMIN — ATORVASTATIN CALCIUM 20 MG: 20 TABLET, FILM COATED ORAL at 20:13

## 2024-04-18 RX ADMIN — ESCITALOPRAM OXALATE 5 MG: 10 TABLET ORAL at 08:44

## 2024-04-18 RX ADMIN — FAMOTIDINE 40 MG: 20 TABLET ORAL at 08:44

## 2024-04-18 RX ADMIN — IPRATROPIUM BROMIDE AND ALBUTEROL SULFATE 3 ML: .5; 3 SOLUTION RESPIRATORY (INHALATION) at 18:38

## 2024-04-18 RX ADMIN — ARFORMOTEROL TARTRATE 15 MCG: 15 SOLUTION RESPIRATORY (INHALATION) at 18:38

## 2024-04-18 RX ADMIN — HYDROXYZINE HYDROCHLORIDE 25 MG: 25 TABLET, FILM COATED ORAL at 19:38

## 2024-04-18 RX ADMIN — BENZONATATE 100 MG: 100 CAPSULE ORAL at 14:06

## 2024-04-18 RX ADMIN — HYDROXYZINE HYDROCHLORIDE 25 MG: 25 TABLET, FILM COATED ORAL at 01:49

## 2024-04-18 RX ADMIN — IPRATROPIUM BROMIDE AND ALBUTEROL SULFATE 3 ML: .5; 3 SOLUTION RESPIRATORY (INHALATION) at 08:06

## 2024-04-18 RX ADMIN — ENOXAPARIN SODIUM 40 MG: 100 INJECTION SUBCUTANEOUS at 08:44

## 2024-04-18 RX ADMIN — HYDROCODONE POLISTIREX AND CHLORPHENIRAMINE POLISTIREX 5 ML: 10; 8 SUSPENSION, EXTENDED RELEASE ORAL at 17:01

## 2024-04-18 NOTE — PROGRESS NOTES
[facility]   Hospitalist Progress Note  Date: 2024  Patient Name: Gloria Sunshine  : 1960  MRN: [id number]  Date of admission: 2024    Subjective   Subjective     Chief Complaint:   Shortness of breath    Summary:   Gloria Sunshine is a 63 y.o. female with a past medical history significant for COPD, hyperlipidemia, hypertension, anxiety who presents to the hospital with a several day history of worsening shortness of breath.  Patient was recently in our facility last month for COPD exacerbation.  Patient states she was doing well for several weeks, patient states that on  she started have more shortness of breath, patient did follow-up with the pulmonology clinic who placed her on Singulair with minimal improvement.  Patient presents the emergency department today for worsening respiratory status.     Interval Followup:   No acute events overnight, breathing seems to be improved, patient still with significant expiratory wheezes    Objective   Objective     Vitals:   Temp:  [98 °F (36.7 °C)-98.5 °F (36.9 °C)] 98.4 °F (36.9 °C)  Heart Rate:  [] 98  Resp:  [18-28] 20  BP: (101-177)/() 135/86  Flow (L/min):  [3] 3    Physical Exam   GEN: No acute distress  HEENT: Moist mucous membranes  LUNGS: Equal chest rise bilaterally  CARDIAC: Regular rate and rhythm  NEURO: Moving all 4 extremities spontaneously  SKIN: No obvious breakdown    Result Review    Result Review:  I have personally reviewed the results as below  [x]  Laboratory CBC, CMP personally reviewed  CBC          3/24/2024    05:54 2024    10:42 2024    05:35   CBC   WBC 8.56  6.91  6.46    RBC 4.26  4.57  4.28    Hemoglobin 12.1  13.0  11.8    Hematocrit 37.2  38.9  36.9    MCV 87.3  85.1  86.2    MCH 28.4  28.4  27.6    MCHC 32.5  33.4  32.0    RDW 13.8  14.2  14.0    Platelets 411  333  313      CMP          3/24/2024    05:54 2024    10:42 2024    05:35   CMP   Glucose 117  143   112    BUN 10  12  9    Creatinine 0.61  0.61  0.57    EGFR 101.2  101.2  102.9    Sodium 137  136  138    Potassium 4.6  4.1  4.6    Chloride 101  97  100    Calcium 9.6  9.7  9.2    Total Protein 6.7  7.2  6.5    Albumin 4.1  4.6  4.3    Globulin 2.6  2.6  2.2    Total Bilirubin 0.2  0.4  0.3    Alkaline Phosphatase 58  66  58    AST (SGOT) 18  24  19    ALT (SGPT) 18  19  17    Albumin/Globulin Ratio 1.6  1.8  2.0    BUN/Creatinine Ratio 16.4  19.7  15.8    Anion Gap 11.0  11.2  9.5      []  Microbiology  []  Radiology  []  EKG/Telemetry   []  Cardiology/Vascular   []  Pathology  []  Old records  []  Other:    Assessment & Plan   Assessment / Plan     Assessment:  COPD with acute exacerbation  Anxiety  Hyperlipidemia  Hypertension  Hypothyroidism     Plan:  Admit patient to the hospital for further workup and management of above  Continue bronchodilators, Brovana, Pulmicort, DuoNebs, as needed albuterol  COVID-19, RSV and influenza negative  Continue prednisone  Continue to hold home antihypertensives, resume as needed  Continue home anxiety lytics  Extended viral respiratory panel negative  Continue Rocephin, azithromycin for now  Chest x-ray personally reviewed, no dense infiltrate noted  Consult pulmonology given multiple hospitalizations for COPD  Wean O2 for SpO2 greater than 88%  CBC, CMP reviewed 4/18/2024   Repeat CBC, CMP, mag and Phos in a.m. 4/18/2024   Patient would likely benefit from 5 days of prednisone to store at home when she starts to have exacerbations, this would likely keep her out of the hospital.     Reviewed patients labs and imaging, and discussed with patient and nurse at bedside.    DVT prophylaxis:  Medical and mechanical DVT prophylaxis orders are present.        CODE STATUS:   Code Status (Patient has no pulse and is not breathing): CPR (Attempt to Resuscitate)  Medical Interventions (Patient has pulse or is breathing): Full Support        Electronically signed by Rico Vargas,  MD, 04/18/24, 12:41 PM EDT.

## 2024-04-18 NOTE — CONSULTS
"Nutrition Services    Patient Name: Gloria Sunshine  YOB: 1960  MRN: [id number]  Admission date: 4/17/2024      CLINICAL NUTRITION ASSESSMENT      Reason for Assessment  BMI     H&P:  Past Medical History:   Diagnosis Date    Anxiety     COPD (chronic obstructive pulmonary disease)     Elevated cholesterol     Hyperlipidemia     Hypertension         Current Problems:   Active Hospital Problems    Diagnosis     **COPD (chronic obstructive pulmonary disease)         Nutrition/Diet History         Narrative   At my visit pt reports that she was not able to eat breakfast this morning due to not liking the food and a poor appetite. Obtained food preferences, will update diet order. Pt reports that intake has been very poor since Sunday. She has been mostly consuming Boost during this time. Pt reports an 8# weight loss during this time (7% weight change). Pt is agreeable to Boost with meals. Pt has been taking a 50+ MVI daily. Pt does report nausea at my visit. No diarrhea or constipation. No meal intakes are documented per RN charting. Pt meets criteria for severe malnutrition.      Anthropometrics        Current Height, Weight Height: 180.3 cm (71\")  Weight: 51.7 kg (113 lb 15.7 oz)   Current BMI Body mass index is 15.9 kg/m².   BMI Classification Underweight   % IBW 73%    Adjusted Body Weight (ABW)    Weight Hx  Wt Readings from Last 30 Encounters:   04/18/24 0500 51.7 kg (113 lb 15.7 oz)   04/17/24 1035 55 kg (121 lb 4.1 oz)   04/10/24 1320 54.4 kg (120 lb)   03/23/24 1823 52.3 kg (115 lb 4.8 oz)   03/23/24 1753 54.4 kg (120 lb)   02/29/24 0819 55.2 kg (121 lb 9.6 oz)   01/30/24 0618 54.9 kg (120 lb 15.8 oz)   01/29/24 0748 53.4 kg (117 lb 11.6 oz)   01/28/24 1555 55.9 kg (123 lb 3.8 oz)   12/04/23 1122 52.6 kg (116 lb)   11/25/23 0539 51.3 kg (113 lb 1.5 oz)   11/25/23 0045 51.3 kg (113 lb 1.5 oz)   11/24/23 1940 51.7 kg (114 lb)   08/09/23 1040 54 kg (119 lb)   07/21/23 0112 56.4 kg (124 lb " 5.4 oz)          Wt Change Observation 9% loss over 9 months      Estimated/Assessed Needs  Estimated Needs based on: Current Body Weight       Energy Requirements 30-35 kcal/kg    EST Needs (kcal/day) 3793-0085 kcal/day        Protein Requirements 1.2-1.5 g.kg   EST Daily Needs (g/day) 62-78 g/day        Fluid Requirements 1 ml/kcal    Estimated Needs (mL/day) 2579-6492 ml/day      Labs/Medications         Pertinent Labs Reviewed.   Results from last 7 days   Lab Units 04/18/24  0535 04/17/24  1042   SODIUM mmol/L 138 136   POTASSIUM mmol/L 4.6 4.1   CHLORIDE mmol/L 100 97*   CO2 mmol/L 28.5 27.8   BUN mg/dL 9 12   CREATININE mg/dL 0.57 0.61   CALCIUM mg/dL 9.2 9.7   BILIRUBIN mg/dL 0.3 0.4   ALK PHOS U/L 58 66   ALT (SGPT) U/L 17 19   AST (SGOT) U/L 19 24   GLUCOSE mg/dL 112* 143*     Results from last 7 days   Lab Units 04/18/24  0535 04/17/24  1042   MAGNESIUM mg/dL 2.1 2.0   PHOSPHORUS mg/dL 3.9  --    HEMOGLOBIN g/dL 11.8* 13.0   HEMATOCRIT % 36.9 38.9     COVID19   Date Value Ref Range Status   04/18/2024 Not Detected Not Detected - Ref. Range Final     Lab Results   Component Value Date    HGBA1C 5.60 11/25/2023         Pertinent Medications Reviewed.     Malnutrition Severity Assessment      Patient meets criteria for : Severe Malnutrition  Malnutrition Type (Last 8 Hours)       Malnutrition Severity Assessment       Row Name 04/18/24 1143       Malnutrition Severity Assessment    Malnutrition Type Chronic Disease - Related Malnutrition      Row Name 04/18/24 1143       Insufficient Energy Intake     Insufficient Energy Intake  < or equal to 50% of est. energy requirement for > or equal to 5d)      Row Name 04/18/24 1143       Unintentional Weight Loss     Unintentional Weight Loss  Weight loss greater than 2% in one week      Row Name 04/18/24 1143       Muscle Loss    Scientology Region Severe - deep hollowing/scooping, lack of muscle to touch, facial bones well defined    Clavicle Bone Region Severe -  protruding prominent bone    Acromion Bone Region Severe - squared shoulders, bones, and acromion process protrusion prominent    Scapular Bone Region Severe - prominent bones, depressions easily visible between ribs, scapula, spine, shoulders    Dorsal Hand Region Severe - prominent depression    Patellar Region Severe - prominent bone, square looking, very little muscle definition    Anterior Thigh Region Moderate - mild depression on inner thigh    Posterior Calf Region Moderate - some roundness, slight firmness      Row Name 04/18/24 1143       Fat Loss    Orbital Region  Severe - pronounced hollowness/depression, dark circles, loose saggy skin    Upper Arm Region Severe - mostly skin, very little space between folds, fingers touch    Thoracic & Lumbar Region Severe - ribs visible with prominent depressions, iliac crest very prominent      Row Name 04/18/24 1143       Criteria Met (Must meet criteria for severity in at least 2 of these categories: M Wasting, Fat Loss, Fluid, Secondary Signs, Wt. Status, Intake)    Patient meets criteria for  Severe Malnutrition                     Nutrition Diagnosis         Nutrition Dx Problem 1 Severe malnutrition related to  COPD  as evidenced by  consuming less than 50% of estimated needs over the past 5 days, severe fat and muscle wasting, reported 8# weight loss over 5 days ( 7% weight change), BMI of 15.9      Nutrition Intervention           Current Nutrition Orders & Evaluation of Intake       Current PO Diet Diet: Regular/House; Fluid Consistency: Thin (IDDSI 0)   Supplement No active supplement orders           Nutrition Intervention/Prescription        Boost TID (Each supplement contains 360 kcal, 14g protein)   Honor food preferences         Medical Nutrition Therapy/Nutrition Education          Learner     Readiness Patient  Acceptance     Method     Response Explanation  Verbalizes understanding     Monitor/Evaluation        Monitor PO intake, Supplement intake      Nutrition Discharge Plan         Recommend to continue oral nutrition supplements on discharge.      Electronically signed by:  Carmella Spencer RD  04/18/24 11:44 EDT

## 2024-04-18 NOTE — SIGNIFICANT NOTE
04/18/24 1045   Coping/Psychosocial   Observed Emotional State calm;cooperative   Verbalized Emotional State relief;hopefulness   Trust Relationship/Rapport empathic listening provided   Involvement in Care interacting with patient   Additional Documentation Spiritual Care (Group)   Spiritual Care   Use of Spiritual Resources non-Judaism use of spiritual care   Spiritual Care Source  initiative   Spiritual Care Follow-Up follow-up, none required as presently assessed   Response to Spiritual Care receptive of support   Spiritual Care Interventions supportive conversation provided   Spiritual Care Visit Type initial   Receptivity to Spiritual Care visit welcomed

## 2024-04-18 NOTE — CONSULTS
Pulmonary Rehab Phase I - Occurrence #1    Education Topics:  Pulmonary Rehab yes     Topic Components:  Exercise yes     Teaching Aids:  Phase 2 Brochure yes     Teaching Method:  Written Instruction yes     Teaching Recipient:  Patient yes       Recovery/Discharge Instructions:  Pulmonary Rehab Phase 2 yes       Patient Qualification:  Pulmonary Rehab Phase 2 yes   Education given to patient on COPD and pulmonary rehab with written information.

## 2024-04-18 NOTE — PLAN OF CARE
Problem: Adult Inpatient Plan of Care  Goal: Plan of Care Review  Outcome: Ongoing, Progressing  Flowsheets (Taken 4/18/2024 1842)  Progress: no change  Plan of Care Reviewed With: patient  Outcome Evaluation: VSS. tesslon was given and pt stated it was not working. md ordered new medication for cough. md ordered resp panel ordered. no new changes at this time.  Goal: Patient-Specific Goal (Individualized)  Outcome: Ongoing, Progressing  Goal: Absence of Hospital-Acquired Illness or Injury  Outcome: Ongoing, Progressing  Intervention: Identify and Manage Fall Risk  Recent Flowsheet Documentation  Taken 4/18/2024 1706 by Tien Anderson RN  Safety Promotion/Fall Prevention: safety round/check completed  Taken 4/18/2024 1500 by Tien Anderson RN  Safety Promotion/Fall Prevention: safety round/check completed  Taken 4/18/2024 1359 by Tien Anderson RN  Safety Promotion/Fall Prevention: safety round/check completed  Taken 4/18/2024 1150 by Tien Anderson RN  Safety Promotion/Fall Prevention: safety round/check completed  Taken 4/18/2024 1100 by Tien Anderson RN  Safety Promotion/Fall Prevention: safety round/check completed  Taken 4/18/2024 0915 by Tien Anderson RN  Safety Promotion/Fall Prevention: safety round/check completed  Taken 4/18/2024 0844 by Tien Anderson RN  Safety Promotion/Fall Prevention: safety round/check completed  Taken 4/18/2024 0750 by Tien Anderson RN  Safety Promotion/Fall Prevention: safety round/check completed  Goal: Optimal Comfort and Wellbeing  Outcome: Ongoing, Progressing  Goal: Readiness for Transition of Care  Outcome: Ongoing, Progressing     Problem: Pain Acute  Goal: Acceptable Pain Control and Functional Ability  Outcome: Ongoing, Progressing     Problem: Fall Injury Risk  Goal: Absence of Fall and Fall-Related Injury  Outcome: Ongoing, Progressing  Intervention: Promote Injury-Free Environment  Recent Flowsheet Documentation  Taken 4/18/2024 1706 by Tien Anderson RN  Safety  Promotion/Fall Prevention: safety round/check completed  Taken 4/18/2024 1500 by Tien Anderson RN  Safety Promotion/Fall Prevention: safety round/check completed  Taken 4/18/2024 1359 by Tien Anderson RN  Safety Promotion/Fall Prevention: safety round/check completed  Taken 4/18/2024 1150 by Tien Anderson RN  Safety Promotion/Fall Prevention: safety round/check completed  Taken 4/18/2024 1100 by Tien Anderson RN  Safety Promotion/Fall Prevention: safety round/check completed  Taken 4/18/2024 0915 by Tien Anderson RN  Safety Promotion/Fall Prevention: safety round/check completed  Taken 4/18/2024 0844 by Tien Anderson RN  Safety Promotion/Fall Prevention: safety round/check completed  Taken 4/18/2024 0750 by Tien Anderson RN  Safety Promotion/Fall Prevention: safety round/check completed     Problem: Adjustment to Illness COPD (Chronic Obstructive Pulmonary Disease)  Goal: Optimal Chronic Illness Coping  Outcome: Ongoing, Progressing     Problem: Functional Ability Impaired COPD (Chronic Obstructive Pulmonary Disease)  Goal: Optimal Level of Functional [place]  Outcome: Ongoing, Progressing     Problem: Infection COPD (Chronic Obstructive Pulmonary Disease)  Goal: Absence of Infection Signs and Symptoms  Outcome: Ongoing, Progressing     Problem: Oral Intake Inadequate COPD (Chronic Obstructive Pulmonary Disease)  Goal: Improved Nutrition Intake  Outcome: Ongoing, Progressing     Problem: Respiratory Compromise COPD (Chronic Obstructive Pulmonary Disease)  Goal: Effective Oxygenation and Ventilation  Outcome: Ongoing, Progressing   Goal Outcome Evaluation:  Plan of Care Reviewed With: patient        Progress: no change  Outcome Evaluation: VSS. tesslon was given and pt stated it was not working. md ordered new medication for cough. md ordered resp panel ordered. no new changes at this time.

## 2024-04-19 PROCEDURE — 94799 UNLISTED PULMONARY SVC/PX: CPT

## 2024-04-19 PROCEDURE — 99233 SBSQ HOSP IP/OBS HIGH 50: CPT | Performed by: INTERNAL MEDICINE

## 2024-04-19 PROCEDURE — 94664 DEMO&/EVAL PT USE INHALER: CPT

## 2024-04-19 PROCEDURE — 25010000002 ENOXAPARIN PER 10 MG: Performed by: INTERNAL MEDICINE

## 2024-04-19 PROCEDURE — 63710000001 PREDNISONE PER 1 MG: Performed by: INTERNAL MEDICINE

## 2024-04-19 PROCEDURE — 25010000002 ONDANSETRON PER 1 MG: Performed by: INTERNAL MEDICINE

## 2024-04-19 RX ADMIN — Medication 1 TABLET: at 08:05

## 2024-04-19 RX ADMIN — MIRTAZAPINE 7.5 MG: 15 TABLET, FILM COATED ORAL at 20:26

## 2024-04-19 RX ADMIN — FAMOTIDINE 40 MG: 20 TABLET ORAL at 08:05

## 2024-04-19 RX ADMIN — IPRATROPIUM BROMIDE AND ALBUTEROL SULFATE 3 ML: .5; 3 SOLUTION RESPIRATORY (INHALATION) at 23:15

## 2024-04-19 RX ADMIN — IPRATROPIUM BROMIDE AND ALBUTEROL SULFATE 3 ML: .5; 3 SOLUTION RESPIRATORY (INHALATION) at 13:34

## 2024-04-19 RX ADMIN — ONDANSETRON 4 MG: 2 INJECTION INTRAMUSCULAR; INTRAVENOUS at 07:17

## 2024-04-19 RX ADMIN — Medication 5 MG: at 20:26

## 2024-04-19 RX ADMIN — ARFORMOTEROL TARTRATE 15 MCG: 15 SOLUTION RESPIRATORY (INHALATION) at 18:18

## 2024-04-19 RX ADMIN — IPRATROPIUM BROMIDE AND ALBUTEROL SULFATE 3 ML: .5; 3 SOLUTION RESPIRATORY (INHALATION) at 07:01

## 2024-04-19 RX ADMIN — ENOXAPARIN SODIUM 40 MG: 100 INJECTION SUBCUTANEOUS at 08:05

## 2024-04-19 RX ADMIN — HYDROXYZINE HYDROCHLORIDE 25 MG: 25 TABLET, FILM COATED ORAL at 20:26

## 2024-04-19 RX ADMIN — BUDESONIDE 0.5 MG: 0.5 SUSPENSION RESPIRATORY (INHALATION) at 18:18

## 2024-04-19 RX ADMIN — Medication 10 ML: at 08:05

## 2024-04-19 RX ADMIN — PREDNISONE 40 MG: 20 TABLET ORAL at 08:05

## 2024-04-19 RX ADMIN — ONDANSETRON 4 MG: 2 INJECTION INTRAMUSCULAR; INTRAVENOUS at 19:16

## 2024-04-19 RX ADMIN — ARFORMOTEROL TARTRATE 15 MCG: 15 SOLUTION RESPIRATORY (INHALATION) at 07:01

## 2024-04-19 RX ADMIN — HYDROXYZINE HYDROCHLORIDE 25 MG: 25 TABLET, FILM COATED ORAL at 11:11

## 2024-04-19 RX ADMIN — HYDROCODONE POLISTIREX AND CHLORPHENIRAMINE POLISTIREX 5 ML: 10; 8 SUSPENSION, EXTENDED RELEASE ORAL at 08:05

## 2024-04-19 RX ADMIN — Medication 10 ML: at 19:17

## 2024-04-19 RX ADMIN — AZITHROMYCIN DIHYDRATE 250 MG: 250 TABLET ORAL at 08:05

## 2024-04-19 RX ADMIN — ESCITALOPRAM OXALATE 5 MG: 10 TABLET ORAL at 08:05

## 2024-04-19 RX ADMIN — HYDROCODONE POLISTIREX AND CHLORPHENIRAMINE POLISTIREX 5 ML: 10; 8 SUSPENSION, EXTENDED RELEASE ORAL at 20:26

## 2024-04-19 RX ADMIN — IPRATROPIUM BROMIDE AND ALBUTEROL SULFATE 3 ML: .5; 3 SOLUTION RESPIRATORY (INHALATION) at 18:19

## 2024-04-19 RX ADMIN — ATORVASTATIN CALCIUM 20 MG: 20 TABLET, FILM COATED ORAL at 20:26

## 2024-04-19 RX ADMIN — BUDESONIDE 0.5 MG: 0.5 SUSPENSION RESPIRATORY (INHALATION) at 07:01

## 2024-04-19 NOTE — PROGRESS NOTES
Pulmonary / Critical Care Progress Note      Patient Name: Gloria Sunshine  : 1960  MRN: [id number]  Attending:  Rico Vargas MD  Date of admission: 2024    Subjective   Subjective   Follow-up for COPD exacerbation     slowly improving  Dyspnea, cough and wheeze little bit better  Respiratory viral panel negative  On 2 L of oxygen  Shortness of breath with walking around room  No nausea, fevers or chills        Objective   Objective     Vitals:   Temp:  [98.1 °F (36.7 °C)-98.4 °F (36.9 °C)] 98.2 °F (36.8 °C)  Heart Rate:  [67-98] 67  Resp:  [18-22] 18  BP: (134-142)/(86-91) 136/86  Flow (L/min):  [3] 3    Physical Exam:  Vital Signs Reviewed   General:  WDWN, Alert, NAD.    HEENT:  PERRL, EOMI.  OP, nares clear  Chest:  good aeration, barrel chested, decreased wheezing and rhonchi bilaterally, tympanic to percussion bilaterally, pursed lip breathing noted  CV: RRR, no MGR, pulses 2+, equal.  Abd:  Soft, NT, ND, + BS, no HSM  EXT:  no clubbing, no cyanosis, no edema  Neuro:  A&Ox3, CN grossly intact, no focal deficits.  Skin: No rashes or lesions noted    Result Review    Result Review:  I have personally reviewed the results from the time of this admission to 2024 07:55 EDT and agree with these findings:  [x]  Laboratory  [x]  Microbiology  []  Radiology  []  EKG/Telemetry   []  Cardiology/Vascular   []  Pathology  []  Old records  []  Other:  Most notable findings include:       Lab 24  0535 24  1042   WBC 6.46 6.91   HEMOGLOBIN 11.8* 13.0   HEMATOCRIT 36.9 38.9   PLATELETS 313 333   SODIUM 138 136   POTASSIUM 4.6 4.1   CHLORIDE 100 97*   CO2 28.5 27.8   BUN 9 12   CREATININE 0.57 0.61   GLUCOSE 112* 143*   CALCIUM 9.2 9.7   PHOSPHORUS 3.9  --    TOTAL PROTEIN 6.5 7.2   ALBUMIN 4.3 4.6   GLOBULIN 2.2 2.6     Respiratory viral panel negative    Assessment & Plan   Assessment / Plan     Active Hospital Problems:  Active Hospital Problems    Diagnosis     **COPD  (chronic obstructive pulmonary disease)      Impression:  Acute on chronic hypoxemic respiratory failure  Acute exacerbation of COPD with frequent hospitalizations  Peripheral eosinophilia  Tobacco abuse of cigarettes in remission  Hyperglycemia     Plan:  Given the fact she has had multiple hospitalizations for COPD, she probably needs to be on either Daliresp or chronic daily azithromycin.  Given her low BMI 15.9, I will avoid Daliresp unless absolutely needed given the GI side effects with potential weight loss.  Continue azithromycin 250 mg daily for chronic suppressive therapy  Continue prednisone 40 mg daily and do a 2-week taper  Continue nebulizers and bronchopulmonary hygiene.  Patient would like to be discharged on nebulizers this time to see if that helps keep her out of the hospital.  Appreciate RT  assistance with arranging  Wean O2 to keep SPO2 greater than 90%  Viral panel and culture so far negative.  Procalcitonin negative  Encourage activity and incentive spirometer use    DVT prophylaxis:  Medical and mechanical DVT prophylaxis orders are present.    CODE STATUS:   Code Status (Patient has no pulse and is not breathing): CPR (Attempt to Resuscitate)  Medical Interventions (Patient has pulse or is breathing): Full Support      Labs, imaging, microbiology, notes and medications personally reviewed  Discussed with primary    Electronically signed by Frankie Zehng MD, 04/19/24, 3:06 PM EDT.

## 2024-04-19 NOTE — PLAN OF CARE
Problem: Adult Inpatient Plan of Care  Goal: Plan of Care Review  Outcome: Ongoing, Progressing  Flowsheets (Taken 4/19/2024 1608)  Progress: no change  Plan of Care Reviewed With: patient  Outcome Evaluation: VSS. GAVE TUSSIONEX AS ORDERED. NO NEW CHANGES AT THIS TIME. GAVE ATARAX AS ORDERED.  Goal: Patient-Specific Goal (Individualized)  Outcome: Ongoing, Progressing  Goal: Absence of Hospital-Acquired Illness or Injury  Outcome: Ongoing, Progressing  Intervention: Identify and Manage Fall Risk  Recent Flowsheet Documentation  Taken 4/19/2024 1502 by Tien Anderson RN  Safety Promotion/Fall Prevention: safety round/check completed  Taken 4/19/2024 1326 by Tien Anderson RN  Safety Promotion/Fall Prevention: safety round/check completed  Taken 4/19/2024 1105 by Tien Anderson RN  Safety Promotion/Fall Prevention: safety round/check completed  Taken 4/19/2024 0740 by Tien Anderson RN  Safety Promotion/Fall Prevention: safety round/check completed  Goal: Optimal Comfort and Wellbeing  Outcome: Ongoing, Progressing  Goal: Readiness for Transition of Care  Outcome: Ongoing, Progressing     Problem: Pain Acute  Goal: Acceptable Pain Control and Functional Ability  Outcome: Ongoing, Progressing     Problem: Fall Injury Risk  Goal: Absence of Fall and Fall-Related Injury  Outcome: Ongoing, Progressing  Intervention: Promote Injury-Free Environment  Recent Flowsheet Documentation  Taken 4/19/2024 1502 by Tien Anderson RN  Safety Promotion/Fall Prevention: safety round/check completed  Taken 4/19/2024 1326 by Tien Anderson RN  Safety Promotion/Fall Prevention: safety round/check completed  Taken 4/19/2024 1105 by Tien Anderson RN  Safety Promotion/Fall Prevention: safety round/check completed  Taken 4/19/2024 0740 by Tien Anderson RN  Safety Promotion/Fall Prevention: safety round/check completed     Problem: Adjustment to Illness COPD (Chronic Obstructive Pulmonary Disease)  Goal: Optimal Chronic Illness  Coping  Outcome: Ongoing, Progressing     Problem: Functional Ability Impaired COPD (Chronic Obstructive Pulmonary Disease)  Goal: Optimal Level of Functional [place]  Outcome: Ongoing, Progressing     Problem: Infection COPD (Chronic Obstructive Pulmonary Disease)  Goal: Absence of Infection Signs and Symptoms  Outcome: Ongoing, Progressing     Problem: Oral Intake Inadequate COPD (Chronic Obstructive Pulmonary Disease)  Goal: Improved Nutrition Intake  Outcome: Ongoing, Progressing     Problem: Respiratory Compromise COPD (Chronic Obstructive Pulmonary Disease)  Goal: Effective Oxygenation and Ventilation  Outcome: Ongoing, Progressing  Intervention: Promote Airway Secretion Clearance  Recent Flowsheet Documentation  Taken 4/19/2024 2183 by Tien Anderson, RN  Cough And Deep Breathing: done independently per patient   Goal Outcome Evaluation:  Plan of Care Reviewed With: patient        Progress: no change  Outcome Evaluation: VSS. GAVE TUSSIONEX AS ORDERED. NO NEW CHANGES AT THIS TIME. GAVE ATARAX AS ORDERED.

## 2024-04-19 NOTE — PLAN OF CARE
Goal Outcome Evaluation:           Progress: no change  Outcome Evaluation: Vss. Tussionex for cough. No complaints of pain. Difficulty sleeping. No changes overnight. Will continue plan of care.

## 2024-04-19 NOTE — PROGRESS NOTES
[facility]   Hospitalist Progress Note  Date: 2024  Patient Name: Gloria Sunshine  : 1960  MRN: [id number]  Date of admission: 2024    Subjective   Subjective     Chief Complaint:   Shortness of breath    Summary:   Gloria Sunshine is a 63 y.o. female with a past medical history significant for COPD, hyperlipidemia, hypertension, anxiety who presents to the hospital with a several day history of worsening shortness of breath.  Patient was recently in our facility last month for COPD exacerbation.  Patient states she was doing well for several weeks, patient states that on  she started have more shortness of breath, patient did follow-up with the pulmonology clinic who placed her on Singulair with minimal improvement.  Patient presents the emergency department today for worsening respiratory status.     Interval Followup:   No acute events overnight, patient continues to have expiratory wheezing, shortness of breath is improving, chest tightness is improved    Objective   Objective     Vitals:   Temp:  [98.2 °F (36.8 °C)-98.4 °F (36.9 °C)] 98.2 °F (36.8 °C)  Heart Rate:  [67-88] 67  Resp:  [18-22] 18  BP: (125-142)/(81-90) 125/81  Flow (L/min):  [3] 3    Physical Exam   GEN: No acute distress  HEENT: Moist mucous membranes  LUNGS: Equal chest rise bilaterally  CARDIAC: Regular rate and rhythm  NEURO: Moving all 4 extremities spontaneously  SKIN: No obvious breakdown    Result Review    Result Review:  I have personally reviewed the results as below  [x]  Laboratory CBC, CMP personally reviewed  CBC          3/24/2024    05:54 2024    10:42 2024    05:35   CBC   WBC 8.56  6.91  6.46    RBC 4.26  4.57  4.28    Hemoglobin 12.1  13.0  11.8    Hematocrit 37.2  38.9  36.9    MCV 87.3  85.1  86.2    MCH 28.4  28.4  27.6    MCHC 32.5  33.4  32.0    RDW 13.8  14.2  14.0    Platelets 411  333  313      CMP          3/24/2024    05:54 2024    10:42 2024     05:35   CMP   Glucose 117  143  112    BUN 10  12  9    Creatinine 0.61  0.61  0.57    EGFR 101.2  101.2  102.9    Sodium 137  136  138    Potassium 4.6  4.1  4.6    Chloride 101  97  100    Calcium 9.6  9.7  9.2    Total Protein 6.7  7.2  6.5    Albumin 4.1  4.6  4.3    Globulin 2.6  2.6  2.2    Total Bilirubin 0.2  0.4  0.3    Alkaline Phosphatase 58  66  58    AST (SGOT) 18  24  19    ALT (SGPT) 18  19  17    Albumin/Globulin Ratio 1.6  1.8  2.0    BUN/Creatinine Ratio 16.4  19.7  15.8    Anion Gap 11.0  11.2  9.5      []  Microbiology  []  Radiology  []  EKG/Telemetry   []  Cardiology/Vascular   []  Pathology  []  Old records  []  Other:    Assessment & Plan   Assessment / Plan     Assessment:  COPD with acute exacerbation  Anxiety  Hyperlipidemia  Hypertension  Hypothyroidism     Plan:  Admit patient to the hospital for further workup and management of above  Continue bronchodilators, Brovana, Pulmicort, DuoNebs, as needed albuterol  COVID-19, RSV and influenza negative  Continue prednisone  Continue to hold home antihypertensives, resume as needed  Continue home anxiety lytics  Extended viral respiratory panel negative  Discontinue Rocephin, patient started on chronic azithromycin for COPD exacerbation suppression  Chest x-ray personally reviewed, no dense infiltrate noted  Consult pulmonology given multiple hospitalizations for COPD, discussed management plan with Dr. Zhneg regarding COPD  Wean O2 for SpO2 greater than 88%  CBC, CMP reviewed 4/19/2024   Repeat CBC, CMP, mag and Phos in a.m. 4/19/2024   RT case management consult for possible transition to nebulizers  Patient would likely benefit from 5 days of prednisone to store at home when she starts to have exacerbations, this would likely keep her out of the hospital.     Reviewed patients labs and imaging, and discussed with patient and nurse at bedside.    DVT prophylaxis:  Medical and mechanical DVT prophylaxis orders are present.        CODE  STATUS:   Code Status (Patient has no pulse and is not breathing): CPR (Attempt to Resuscitate)  Medical Interventions (Patient has pulse or is breathing): Full Support    Time spent: Time spent involving patient care including face-to-face encounter 51 minutes    Electronically signed by Rico Vargas MD, 04/19/24, 11:42 AM EDT.

## 2024-04-19 NOTE — CONSULTS
Ms. Bolton states she would prefer to take nebulized maintenance medications.      RT CM advised patient to call her insurance as the copay could potentially be significantly high.   Patient is currently on Symbicort and Spirva; nebulized substitution equivalent would be   Formoterol (LABA) and Budesonide (ICS) and Tiotropium Bromide (LAMA)    LABA: Brovana or Perforomist  LAMA: Yupelri  ICS: Budesonide    Voucher was provided for Yupelri and patient was advised to sign up for copay assistance card with Yupelri online. Unfortunately, there are not copay assistance cards available for Brovana, Perforomist or Budesonide.     Patient was provided medication chart outlining medication options for each medication class and combination medications available via inhaler (Breztri/Trelegy) which would most likely be more economical.

## 2024-04-20 PROCEDURE — 86682 HELMINTH ANTIBODY: CPT | Performed by: INTERNAL MEDICINE

## 2024-04-20 PROCEDURE — 63710000001 PREDNISONE PER 1 MG: Performed by: INTERNAL MEDICINE

## 2024-04-20 PROCEDURE — 94799 UNLISTED PULMONARY SVC/PX: CPT

## 2024-04-20 PROCEDURE — 99233 SBSQ HOSP IP/OBS HIGH 50: CPT | Performed by: INTERNAL MEDICINE

## 2024-04-20 PROCEDURE — 83516 IMMUNOASSAY NONANTIBODY: CPT | Performed by: INTERNAL MEDICINE

## 2024-04-20 PROCEDURE — 25010000002 ENOXAPARIN PER 10 MG: Performed by: INTERNAL MEDICINE

## 2024-04-20 PROCEDURE — 82785 ASSAY OF IGE: CPT | Performed by: INTERNAL MEDICINE

## 2024-04-20 PROCEDURE — 94664 DEMO&/EVAL PT USE INHALER: CPT

## 2024-04-20 PROCEDURE — 86037 ANCA TITER EACH ANTIBODY: CPT | Performed by: INTERNAL MEDICINE

## 2024-04-20 PROCEDURE — 25010000002 ONDANSETRON PER 1 MG: Performed by: INTERNAL MEDICINE

## 2024-04-20 RX ORDER — ACETAMINOPHEN 325 MG/1
650 TABLET ORAL EVERY 4 HOURS PRN
Status: DISCONTINUED | OUTPATIENT
Start: 2024-04-20 | End: 2024-04-23 | Stop reason: HOSPADM

## 2024-04-20 RX ADMIN — ATORVASTATIN CALCIUM 20 MG: 20 TABLET, FILM COATED ORAL at 20:04

## 2024-04-20 RX ADMIN — MIRTAZAPINE 7.5 MG: 15 TABLET, FILM COATED ORAL at 20:04

## 2024-04-20 RX ADMIN — IPRATROPIUM BROMIDE AND ALBUTEROL SULFATE 3 ML: .5; 3 SOLUTION RESPIRATORY (INHALATION) at 18:52

## 2024-04-20 RX ADMIN — HYDROXYZINE HYDROCHLORIDE 25 MG: 25 TABLET, FILM COATED ORAL at 20:04

## 2024-04-20 RX ADMIN — ONDANSETRON 4 MG: 2 INJECTION INTRAMUSCULAR; INTRAVENOUS at 07:00

## 2024-04-20 RX ADMIN — IPRATROPIUM BROMIDE AND ALBUTEROL SULFATE 3 ML: .5; 3 SOLUTION RESPIRATORY (INHALATION) at 11:33

## 2024-04-20 RX ADMIN — HYDROCODONE POLISTIREX AND CHLORPHENIRAMINE POLISTIREX 5 ML: 10; 8 SUSPENSION, EXTENDED RELEASE ORAL at 20:03

## 2024-04-20 RX ADMIN — IPRATROPIUM BROMIDE AND ALBUTEROL SULFATE 3 ML: .5; 3 SOLUTION RESPIRATORY (INHALATION) at 06:47

## 2024-04-20 RX ADMIN — AZITHROMYCIN DIHYDRATE 250 MG: 250 TABLET ORAL at 08:26

## 2024-04-20 RX ADMIN — PHENOL 1 SPRAY: 1.5 LIQUID ORAL at 07:02

## 2024-04-20 RX ADMIN — ONDANSETRON 4 MG: 2 INJECTION INTRAMUSCULAR; INTRAVENOUS at 19:48

## 2024-04-20 RX ADMIN — ACETAMINOPHEN 650 MG: 325 TABLET ORAL at 07:01

## 2024-04-20 RX ADMIN — FAMOTIDINE 40 MG: 20 TABLET ORAL at 08:26

## 2024-04-20 RX ADMIN — BUDESONIDE 0.5 MG: 0.5 SUSPENSION RESPIRATORY (INHALATION) at 18:51

## 2024-04-20 RX ADMIN — ARFORMOTEROL TARTRATE 15 MCG: 15 SOLUTION RESPIRATORY (INHALATION) at 18:51

## 2024-04-20 RX ADMIN — ENOXAPARIN SODIUM 40 MG: 100 INJECTION SUBCUTANEOUS at 08:26

## 2024-04-20 RX ADMIN — ESCITALOPRAM OXALATE 5 MG: 10 TABLET ORAL at 08:27

## 2024-04-20 RX ADMIN — Medication 1 TABLET: at 08:26

## 2024-04-20 RX ADMIN — Medication 10 ML: at 20:04

## 2024-04-20 RX ADMIN — PREDNISONE 40 MG: 20 TABLET ORAL at 08:26

## 2024-04-20 RX ADMIN — Medication 5 MG: at 20:03

## 2024-04-20 RX ADMIN — BUDESONIDE 0.5 MG: 0.5 SUSPENSION RESPIRATORY (INHALATION) at 06:47

## 2024-04-20 RX ADMIN — Medication 10 ML: at 08:27

## 2024-04-20 RX ADMIN — ARFORMOTEROL TARTRATE 15 MCG: 15 SOLUTION RESPIRATORY (INHALATION) at 06:47

## 2024-04-20 NOTE — PROGRESS NOTES
[facility]   Hospitalist Progress Note  Date: 2024  Patient Name: Gloria Sunshine  : 1960  MRN: [id number]  Date of admission: 2024    Subjective   Subjective     Chief Complaint:   Shortness of breath    Summary:   Gloria Sunshine is a 63 y.o. female with a past medical history significant for COPD, hyperlipidemia, hypertension, anxiety who presents to the hospital with a several day history of worsening shortness of breath.  Patient was recently in our facility last month for COPD exacerbation.  Patient states she was doing well for several weeks, patient states that on  she started have more shortness of breath, patient did follow-up with the pulmonology clinic who placed her on Singulair with minimal improvement.  Patient presents the emergency department today for worsening respiratory status.     Interval Followup:   No acute events overnight, patient still with some expiratory wheezing and shortness of breath with ambulation    Objective   Objective     Vitals:   Temp:  [98.1 °F (36.7 °C)-98.6 °F (37 °C)] 98.2 °F (36.8 °C)  Heart Rate:  [70-85] 77  Resp:  [16-18] 16  BP: (115-149)/(67-90) 149/73    Physical Exam   GEN: No acute distress  HEENT: Moist mucous membranes  LUNGS: Equal chest rise bilaterally  CARDIAC: Regular rate and rhythm  NEURO: Moving all 4 extremities spontaneously  SKIN: No obvious breakdown    Result Review    Result Review:  I have personally reviewed the results as below  [x]  Laboratory CBC, CMP personally reviewed  CBC          3/24/2024    05:54 2024    10:42 2024    05:35   CBC   WBC 8.56  6.91  6.46    RBC 4.26  4.57  4.28    Hemoglobin 12.1  13.0  11.8    Hematocrit 37.2  38.9  36.9    MCV 87.3  85.1  86.2    MCH 28.4  28.4  27.6    MCHC 32.5  33.4  32.0    RDW 13.8  14.2  14.0    Platelets 411  333  313      CMP          3/24/2024    05:54 2024    10:42 2024    05:35   CMP   Glucose 117  143  112    BUN 10  12  9     Creatinine 0.61  0.61  0.57    EGFR 101.2  101.2  102.9    Sodium 137  136  138    Potassium 4.6  4.1  4.6    Chloride 101  97  100    Calcium 9.6  9.7  9.2    Total Protein 6.7  7.2  6.5    Albumin 4.1  4.6  4.3    Globulin 2.6  2.6  2.2    Total Bilirubin 0.2  0.4  0.3    Alkaline Phosphatase 58  66  58    AST (SGOT) 18  24  19    ALT (SGPT) 18  19  17    Albumin/Globulin Ratio 1.6  1.8  2.0    BUN/Creatinine Ratio 16.4  19.7  15.8    Anion Gap 11.0  11.2  9.5      []  Microbiology  []  Radiology  []  EKG/Telemetry   []  Cardiology/Vascular   []  Pathology  []  Old records  []  Other:    Assessment & Plan   Assessment / Plan     Assessment:  COPD with acute exacerbation  Anxiety  Hyperlipidemia  Hypertension  Hypothyroidism  Eosinophilia     Plan:  Admit patient to the hospital for further workup and management of above  Continue bronchodilators, Brovana, Pulmicort, DuoNebs, as needed albuterol  COVID-19, RSV and influenza negative  Continue prednisone  Continue to hold home antihypertensives, resume as needed  Continue home anxiety lytics  Extended viral respiratory panel negative  Check Strongyloides given eosinophilia  IgE levels pending  Discontinue Rocephin, patient started on chronic azithromycin for COPD exacerbation suppression  Chest x-ray personally reviewed, no dense infiltrate noted  Consult pulmonology given multiple hospitalizations for COPD, discussed management plan with Dr. Ponce regarding COPD  Wean O2 for SpO2 greater than 88%  CBC, CMP reviewed 4/20/2024   Repeat CBC, CMP, mag and Phos in a.m. 4/20/2024   RT case management consult for possible transition to nebulizers  Patient would likely benefit from 5 days of prednisone to store at home when she starts to have exacerbations, this would likely keep her out of the hospital.     Reviewed patients labs and imaging, and discussed with patient and nurse at bedside.    DVT prophylaxis:  Medical and mechanical DVT prophylaxis orders are  present.        CODE STATUS:   Code Status (Patient has no pulse and is not breathing): CPR (Attempt to Resuscitate)  Medical Interventions (Patient has pulse or is breathing): Full Support    Time spent: Time spent involving patient care including face-to-face encounter 52 minutes      Electronically signed by Rico Vargas MD, 04/20/24, 11:16 AM EDT.

## 2024-04-20 NOTE — PLAN OF CARE
Goal Outcome Evaluation:            Vitals stable during shift. No complaints during shift. Pt still getting short of air on exertion at times. Educated pt on taking breaks and breathing techniques. Continue care plan.

## 2024-04-20 NOTE — PROGRESS NOTES
Pulmonary / Critical Care Progress Note      Patient Name: Gloria Sunshine  : 1960  MRN: [id number]  Attending:  Rico Vargas MD  Date of admission: 2024    Subjective   Subjective   Follow-up for COPD exacerbation     Improving  Still with some wheezing  Feels treatments are helping      Objective   Objective     Vitals:   Temp:  [98.1 °F (36.7 °C)-98.6 °F (37 °C)] 98.2 °F (36.8 °C)  Heart Rate:  [70-85] 77  Resp:  [16-18] 16  BP: (115-149)/(67-90) 149/73    Physical Exam:  Vital Signs Reviewed  General WDWN, Alert, NAD.    Chest:  good aeration, still with some expiratory wheezing  CV: RRR, no MGR, .  EXT:  no clubbing, no cyanosis, no edema, no joint tenderness  Neuro:  A&Ox3, CN grossly intact, no focal deficits.  Skin: No rashes or lesions noted  Result Review    Result Review:  I have personally reviewed the results from the time of this admission to 2024 09:52 EDT and agree with these findings:  [x]  Laboratory  [x]  Microbiology  []  Radiology  []  EKG/Telemetry   []  Cardiology/Vascular   []  Pathology  []  Old records  []  Other:  Most notable findings include:       Lab 24  0535 24  1042   WBC 6.46 6.91   HEMOGLOBIN 11.8* 13.0   HEMATOCRIT 36.9 38.9   PLATELETS 313 333   SODIUM 138 136   POTASSIUM 4.6 4.1   CHLORIDE 100 97*   CO2 28.5 27.8   BUN 9 12   CREATININE 0.57 0.61   GLUCOSE 112* 143*   CALCIUM 9.2 9.7   PHOSPHORUS 3.9  --    TOTAL PROTEIN 6.5 7.2   ALBUMIN 4.3 4.6   GLOBULIN 2.2 2.6     Respiratory viral panel negative    Assessment & Plan   Assessment / Plan     Active Hospital Problems:  Active Hospital Problems    Diagnosis     **COPD (chronic obstructive pulmonary disease)     COPD exacerbation      Impression:  Acute on chronic hypoxemic respiratory failure  Acute exacerbation of COPD with frequent hospitalizations  Peripheral eosinophilia  Tobacco abuse of cigarettes in remission  Hyperglycemia     Plan:  Patient does have significant  peripheral eosinophilia  Has had eosinophilia now dating back for several years  Suspect patient would benefit from a biologic agent such as Dupixent or Fasenra or Mepolizumab  Will send ANCA  Obtain IgE level  Will need outpatient PFTs  Will like to see patient in the office next week after she has been discharged to try to set up perform these biological agents to avoid further rehospitalization  Continue prednisone  Reviewed 2-week taper  Continue LABA LAMA inhaled corticosteroids  Continue incentive spirometer      DVT prophylaxis:  Medical and mechanical DVT prophylaxis orders are present.    CODE STATUS:   Code Status (Patient has no pulse and is not breathing): CPR (Attempt to Resuscitate)  Medical Interventions (Patient has pulse or is breathing): Full Support      Labs, imaging, microbiology, notes and medications personally reviewed  Discussed with primary    Electronically signed by Da Ponce DO, 04/20/24, 9:52 AM EDT.

## 2024-04-21 PROCEDURE — 94799 UNLISTED PULMONARY SVC/PX: CPT

## 2024-04-21 PROCEDURE — 25010000002 ENOXAPARIN PER 10 MG: Performed by: INTERNAL MEDICINE

## 2024-04-21 PROCEDURE — 94664 DEMO&/EVAL PT USE INHALER: CPT

## 2024-04-21 PROCEDURE — 99232 SBSQ HOSP IP/OBS MODERATE 35: CPT | Performed by: INTERNAL MEDICINE

## 2024-04-21 PROCEDURE — 63710000001 PREDNISONE PER 1 MG: Performed by: INTERNAL MEDICINE

## 2024-04-21 PROCEDURE — 25010000002 ONDANSETRON PER 1 MG: Performed by: INTERNAL MEDICINE

## 2024-04-21 RX ADMIN — HYDROXYZINE HYDROCHLORIDE 25 MG: 25 TABLET, FILM COATED ORAL at 21:08

## 2024-04-21 RX ADMIN — ESCITALOPRAM OXALATE 5 MG: 10 TABLET ORAL at 08:45

## 2024-04-21 RX ADMIN — Medication 1 TABLET: at 08:45

## 2024-04-21 RX ADMIN — IPRATROPIUM BROMIDE AND ALBUTEROL SULFATE 3 ML: .5; 3 SOLUTION RESPIRATORY (INHALATION) at 01:16

## 2024-04-21 RX ADMIN — IPRATROPIUM BROMIDE AND ALBUTEROL SULFATE 3 ML: .5; 3 SOLUTION RESPIRATORY (INHALATION) at 18:52

## 2024-04-21 RX ADMIN — ARFORMOTEROL TARTRATE 15 MCG: 15 SOLUTION RESPIRATORY (INHALATION) at 06:48

## 2024-04-21 RX ADMIN — ONDANSETRON 4 MG: 2 INJECTION INTRAMUSCULAR; INTRAVENOUS at 19:35

## 2024-04-21 RX ADMIN — BUDESONIDE 0.5 MG: 0.5 SUSPENSION RESPIRATORY (INHALATION) at 18:52

## 2024-04-21 RX ADMIN — ARFORMOTEROL TARTRATE 15 MCG: 15 SOLUTION RESPIRATORY (INHALATION) at 18:52

## 2024-04-21 RX ADMIN — Medication 10 ML: at 08:44

## 2024-04-21 RX ADMIN — FAMOTIDINE 40 MG: 20 TABLET ORAL at 08:44

## 2024-04-21 RX ADMIN — ATORVASTATIN CALCIUM 20 MG: 20 TABLET, FILM COATED ORAL at 20:53

## 2024-04-21 RX ADMIN — BENZONATATE 100 MG: 100 CAPSULE ORAL at 08:53

## 2024-04-21 RX ADMIN — IPRATROPIUM BROMIDE AND ALBUTEROL SULFATE 3 ML: .5; 3 SOLUTION RESPIRATORY (INHALATION) at 06:48

## 2024-04-21 RX ADMIN — PREDNISONE 40 MG: 20 TABLET ORAL at 08:45

## 2024-04-21 RX ADMIN — Medication 10 ML: at 20:53

## 2024-04-21 RX ADMIN — Medication 5 MG: at 21:08

## 2024-04-21 RX ADMIN — ONDANSETRON 4 MG: 2 INJECTION INTRAMUSCULAR; INTRAVENOUS at 07:19

## 2024-04-21 RX ADMIN — DOCUSATE SODIUM 50MG AND SENNOSIDES 8.6MG 2 TABLET: 8.6; 5 TABLET, FILM COATED ORAL at 01:35

## 2024-04-21 RX ADMIN — AZITHROMYCIN DIHYDRATE 250 MG: 250 TABLET ORAL at 08:45

## 2024-04-21 RX ADMIN — HYDROCODONE POLISTIREX AND CHLORPHENIRAMINE POLISTIREX 5 ML: 10; 8 SUSPENSION, EXTENDED RELEASE ORAL at 14:28

## 2024-04-21 RX ADMIN — BUDESONIDE 0.5 MG: 0.5 SUSPENSION RESPIRATORY (INHALATION) at 06:48

## 2024-04-21 RX ADMIN — MIRTAZAPINE 7.5 MG: 15 TABLET, FILM COATED ORAL at 20:53

## 2024-04-21 RX ADMIN — IPRATROPIUM BROMIDE AND ALBUTEROL SULFATE 3 ML: .5; 3 SOLUTION RESPIRATORY (INHALATION) at 11:53

## 2024-04-21 NOTE — PROGRESS NOTES
[facility]   Hospitalist Progress Note  Date: 2024  Patient Name: Gloria Sunshine  : 1960  MRN: [id number]  Date of admission: 2024    Subjective   Subjective     Chief Complaint:   Shortness of breath    Summary:   Gloria Sunshine is a 63 y.o. female with a past medical history significant for COPD, hyperlipidemia, hypertension, anxiety who presents to the hospital with a several day history of worsening shortness of breath.  Patient was recently in our facility last month for COPD exacerbation.  Patient states she was doing well for several weeks, patient states that on  she started have more shortness of breath, patient did follow-up with the pulmonology clinic who placed her on Singulair with minimal improvement.  Patient presents the emergency department today for worsening respiratory status.     Interval Followup:   No acute vents overnight, patient with improvement of her respiratory status, IgE and Strongyloides is pending    Objective   Objective     Vitals:   Temp:  [98 °F (36.7 °C)-98.8 °F (37.1 °C)] 98.6 °F (37 °C)  Heart Rate:  [62-96] 76  Resp:  [16-18] 16  BP: (117-161)/(71-94) 136/79  Flow (L/min):  [2] 2    Physical Exam   GEN: No acute distress  HEENT: Moist mucous membranes  LUNGS: Equal chest rise bilaterally  CARDIAC: Regular rate and rhythm  NEURO: Moving all 4 extremities spontaneously  SKIN: No obvious breakdown    Result Review    Result Review:  I have personally reviewed the results as below  [x]  Laboratory CBC, CMP personally reviewed  CBC          3/24/2024    05:54 2024    10:42 2024    05:35   CBC   WBC 8.56  6.91  6.46    RBC 4.26  4.57  4.28    Hemoglobin 12.1  13.0  11.8    Hematocrit 37.2  38.9  36.9    MCV 87.3  85.1  86.2    MCH 28.4  28.4  27.6    MCHC 32.5  33.4  32.0    RDW 13.8  14.2  14.0    Platelets 411  333  313      CMP          3/24/2024    05:54 2024    10:42 2024    05:35   CMP   Glucose 117  143   112    BUN 10  12  9    Creatinine 0.61  0.61  0.57    EGFR 101.2  101.2  102.9    Sodium 137  136  138    Potassium 4.6  4.1  4.6    Chloride 101  97  100    Calcium 9.6  9.7  9.2    Total Protein 6.7  7.2  6.5    Albumin 4.1  4.6  4.3    Globulin 2.6  2.6  2.2    Total Bilirubin 0.2  0.4  0.3    Alkaline Phosphatase 58  66  58    AST (SGOT) 18  24  19    ALT (SGPT) 18  19  17    Albumin/Globulin Ratio 1.6  1.8  2.0    BUN/Creatinine Ratio 16.4  19.7  15.8    Anion Gap 11.0  11.2  9.5      []  Microbiology  []  Radiology  []  EKG/Telemetry   []  Cardiology/Vascular   []  Pathology  []  Old records  []  Other:    Assessment & Plan   Assessment / Plan     Assessment:  COPD with acute exacerbation  Anxiety  Hyperlipidemia  Hypertension  Hypothyroidism  Eosinophilia     Plan:  Admit patient to the hospital for further workup and management of above  Continue bronchodilators, Brovana, Pulmicort, DuoNebs, as needed albuterol  COVID-19, RSV and influenza negative  Continue prednisone  Continue to hold home antihypertensives, resume as needed  Continue home anxiety lytics  Extended viral respiratory panel negative  Strongyloides is pending  IgE levels pending  Discontinue Rocephin, patient started on chronic azithromycin for COPD exacerbation suppression  Chest x-ray personally reviewed, no dense infiltrate noted  Consult pulmonology given multiple hospitalizations for COPD, discussed management plan with Dr. Ponce regarding COPD  Wean O2 for SpO2 greater than 88%  CBC, CMP reviewed 4/21/2024   Repeat CBC, CMP, mag and Phos in a.m. 4/21/2024   RT case management consult for possible transition to nebulizers  Patient would likely benefit from 5 days of prednisone to store at home when she starts to have exacerbations, this would likely keep her out of the hospital.     Reviewed patients labs and imaging, and discussed with patient and nurse at bedside.    DVT prophylaxis:  Medical and mechanical DVT prophylaxis orders are  present.        CODE STATUS:   Code Status (Patient has no pulse and is not breathing): CPR (Attempt to Resuscitate)  Medical Interventions (Patient has pulse or is breathing): Full Support      Electronically signed by Rico Vargas MD, 04/21/24, 11:49 AM EDT.

## 2024-04-21 NOTE — PLAN OF CARE
Goal Outcome Evaluation:            Vitals stable during shift. Complaints of a productive cough x2. See MAR. Pt anxious about pending lab results. Continue care plan.

## 2024-04-21 NOTE — PLAN OF CARE
Goal Outcome Evaluation:  Plan of Care Reviewed With: patient           Outcome Evaluation: vss. tussinex x 1, zofran x 2. no significant changes.

## 2024-04-21 NOTE — PROGRESS NOTES
Pulmonary / Critical Care Progress Note      Patient Name: Gloria Sunshine  : 1960  MRN: [id number]  Attending:  Rico Vargas MD  Date of admission: 2024    Subjective   Subjective   Follow-up for COPD exacerbation     Improving  Still with some wheezing  Feels treatments are helping  On 2 L of oxygen      Objective   Objective     Vitals:   Temp:  [98 °F (36.7 °C)-98.8 °F (37.1 °C)] 98.2 °F (36.8 °C)  Heart Rate:  [62-96] 74  Resp:  [16-18] 16  BP: (117-161)/(71-94) 125/78  Flow (L/min):  [2] 2    Physical Exam:  Vital Signs Reviewed  General WDWN, Alert, NAD.    Chest:  good aeration, still with some expiratory wheezing  CV: RRR, no MGR, .  EXT:  no clubbing, no cyanosis, no edema, no joint tenderness  Neuro:  A&Ox3, CN grossly intact, no focal deficits.  Skin: No rashes or lesions noted  Result Review    Result Review:  I have personally reviewed the results from the time of this admission to 2024 17:07 EDT and agree with these findings:  [x]  Laboratory  [x]  Microbiology  []  Radiology  []  EKG/Telemetry   []  Cardiology/Vascular   []  Pathology  []  Old records  []  Other:  Most notable findings include:       Lab 24  0535 24  1042   WBC 6.46 6.91   HEMOGLOBIN 11.8* 13.0   HEMATOCRIT 36.9 38.9   PLATELETS 313 333   SODIUM 138 136   POTASSIUM 4.6 4.1   CHLORIDE 100 97*   CO2 28.5 27.8   BUN 9 12   CREATININE 0.57 0.61   GLUCOSE 112* 143*   CALCIUM 9.2 9.7   PHOSPHORUS 3.9  --    TOTAL PROTEIN 6.5 7.2   ALBUMIN 4.3 4.6   GLOBULIN 2.2 2.6     Respiratory viral panel negative    Assessment & Plan   Assessment / Plan     Active Hospital Problems:  Active Hospital Problems    Diagnosis     **COPD (chronic obstructive pulmonary disease)     COPD exacerbation      Impression:  Acute on chronic hypoxemic respiratory failure  Acute exacerbation of COPD with frequent hospitalizations  Peripheral eosinophilia  Tobacco abuse of cigarettes in remission  Hyperglycemia      Plan:  Much improved  Does have peripheral eosinophilia  IgE pending  Patient will be a candidate for biological agent as an outpatient   Fasenra Mepolizumab  ANCA has been sent and is pending  Need PFTs as an outpatient  We will try to obtain bedside spirometry tomorrow       DVT prophylaxis:  Medical and mechanical DVT prophylaxis orders are present.    CODE STATUS:   Code Status (Patient has no pulse and is not breathing): CPR (Attempt to Resuscitate)  Medical Interventions (Patient has pulse or is breathing): Full Support      Labs, imaging, microbiology, notes and medications personally reviewed  Discussed with primary    Electronically signed by Da Ponce DO, 04/21/24, 5:07 PM EDT.

## 2024-04-22 LAB
BACTERIA SPEC AEROBE CULT: NORMAL
BACTERIA SPEC AEROBE CULT: NORMAL

## 2024-04-22 PROCEDURE — 99232 SBSQ HOSP IP/OBS MODERATE 35: CPT | Performed by: INTERNAL MEDICINE

## 2024-04-22 PROCEDURE — 94799 UNLISTED PULMONARY SVC/PX: CPT

## 2024-04-22 PROCEDURE — 63710000001 PREDNISONE PER 1 MG: Performed by: INTERNAL MEDICINE

## 2024-04-22 PROCEDURE — 25010000002 ONDANSETRON PER 1 MG: Performed by: INTERNAL MEDICINE

## 2024-04-22 PROCEDURE — 94664 DEMO&/EVAL PT USE INHALER: CPT

## 2024-04-22 PROCEDURE — 99233 SBSQ HOSP IP/OBS HIGH 50: CPT | Performed by: INTERNAL MEDICINE

## 2024-04-22 RX ADMIN — ONDANSETRON 4 MG: 2 INJECTION INTRAMUSCULAR; INTRAVENOUS at 19:50

## 2024-04-22 RX ADMIN — FAMOTIDINE 40 MG: 20 TABLET ORAL at 08:11

## 2024-04-22 RX ADMIN — IPRATROPIUM BROMIDE AND ALBUTEROL SULFATE 3 ML: .5; 3 SOLUTION RESPIRATORY (INHALATION) at 23:59

## 2024-04-22 RX ADMIN — ARFORMOTEROL TARTRATE 15 MCG: 15 SOLUTION RESPIRATORY (INHALATION) at 18:32

## 2024-04-22 RX ADMIN — Medication 5 MG: at 21:51

## 2024-04-22 RX ADMIN — IPRATROPIUM BROMIDE AND ALBUTEROL SULFATE 3 ML: .5; 3 SOLUTION RESPIRATORY (INHALATION) at 00:26

## 2024-04-22 RX ADMIN — ATORVASTATIN CALCIUM 20 MG: 20 TABLET, FILM COATED ORAL at 21:51

## 2024-04-22 RX ADMIN — Medication 10 ML: at 19:50

## 2024-04-22 RX ADMIN — AZITHROMYCIN DIHYDRATE 250 MG: 250 TABLET ORAL at 08:11

## 2024-04-22 RX ADMIN — IPRATROPIUM BROMIDE AND ALBUTEROL SULFATE 3 ML: .5; 3 SOLUTION RESPIRATORY (INHALATION) at 11:53

## 2024-04-22 RX ADMIN — IPRATROPIUM BROMIDE AND ALBUTEROL SULFATE 3 ML: .5; 3 SOLUTION RESPIRATORY (INHALATION) at 07:40

## 2024-04-22 RX ADMIN — Medication 10 ML: at 08:12

## 2024-04-22 RX ADMIN — BUDESONIDE 0.5 MG: 0.5 SUSPENSION RESPIRATORY (INHALATION) at 18:32

## 2024-04-22 RX ADMIN — ESCITALOPRAM OXALATE 5 MG: 10 TABLET ORAL at 08:11

## 2024-04-22 RX ADMIN — HYDROXYZINE HYDROCHLORIDE 25 MG: 25 TABLET, FILM COATED ORAL at 21:51

## 2024-04-22 RX ADMIN — ARFORMOTEROL TARTRATE 15 MCG: 15 SOLUTION RESPIRATORY (INHALATION) at 07:40

## 2024-04-22 RX ADMIN — IPRATROPIUM BROMIDE AND ALBUTEROL SULFATE 3 ML: .5; 3 SOLUTION RESPIRATORY (INHALATION) at 18:32

## 2024-04-22 RX ADMIN — PREDNISONE 40 MG: 20 TABLET ORAL at 08:11

## 2024-04-22 RX ADMIN — HYDROCODONE POLISTIREX AND CHLORPHENIRAMINE POLISTIREX 5 ML: 10; 8 SUSPENSION, EXTENDED RELEASE ORAL at 04:15

## 2024-04-22 RX ADMIN — Medication 1 TABLET: at 08:11

## 2024-04-22 RX ADMIN — BUDESONIDE 0.5 MG: 0.5 SUSPENSION RESPIRATORY (INHALATION) at 07:40

## 2024-04-22 RX ADMIN — DOCUSATE SODIUM 50MG AND SENNOSIDES 8.6MG 2 TABLET: 8.6; 5 TABLET, FILM COATED ORAL at 19:50

## 2024-04-22 RX ADMIN — ONDANSETRON 4 MG: 2 INJECTION INTRAMUSCULAR; INTRAVENOUS at 08:11

## 2024-04-22 RX ADMIN — MIRTAZAPINE 7.5 MG: 15 TABLET, FILM COATED ORAL at 21:51

## 2024-04-22 NOTE — PLAN OF CARE
Goal Outcome Evaluation:  Plan of Care Reviewed With: patient           Outcome Evaluation: Pt still having some SOA on exertion. Medicated with zofran after neb tx for c/o nausea. Tussionex given for cough.

## 2024-04-22 NOTE — PROGRESS NOTES
[facility]   Hospitalist Progress Note  Date: 2024  Patient Name: Gloria Sunshine  : 1960  MRN: [id number]  Date of admission: 2024    Subjective   Subjective     Chief Complaint:   Shortness of breath    Summary:   Gloria Sunshine is a 63 y.o. female with a past medical history significant for COPD, hyperlipidemia, hypertension, anxiety who presents to the hospital with a several day history of worsening shortness of breath.  Patient was recently in our facility last month for COPD exacerbation.  Patient states she was doing well for several weeks, patient states that on  she started have more shortness of breath, patient did follow-up with the pulmonology clinic who placed her on Singulair with minimal improvement.  Patient presents the emergency department today for worsening respiratory status.     Interval Followup:   No acute events overnight, patient with continued improvement of her respiratory status, IgE and Strongyloides is pending    Objective   Objective     Vitals:   Temp:  [98.2 °F (36.8 °C)-98.6 °F (37 °C)] 98.6 °F (37 °C)  Heart Rate:  [68-90] 81  Resp:  [16-20] 20  BP: (114-131)/(66-80) 125/76  Flow (L/min):  [2] 2    Physical Exam   GEN: No acute distress  HEENT: Moist mucous membranes  LUNGS: Equal chest rise bilaterally  CARDIAC: Regular rate and rhythm  NEURO: Moving all 4 extremities spontaneously  SKIN: No obvious breakdown    Result Review    Result Review:  I have personally reviewed the results as below  [x]  Laboratory CBC, CMP personally reviewed  CBC          3/24/2024    05:54 2024    10:42 2024    05:35   CBC   WBC 8.56  6.91  6.46    RBC 4.26  4.57  4.28    Hemoglobin 12.1  13.0  11.8    Hematocrit 37.2  38.9  36.9    MCV 87.3  85.1  86.2    MCH 28.4  28.4  27.6    MCHC 32.5  33.4  32.0    RDW 13.8  14.2  14.0    Platelets 411  333  313      CMP          3/24/2024    05:54 2024    10:42 2024    05:35   CMP   Glucose  117  143  112    BUN 10  12  9    Creatinine 0.61  0.61  0.57    EGFR 101.2  101.2  102.9    Sodium 137  136  138    Potassium 4.6  4.1  4.6    Chloride 101  97  100    Calcium 9.6  9.7  9.2    Total Protein 6.7  7.2  6.5    Albumin 4.1  4.6  4.3    Globulin 2.6  2.6  2.2    Total Bilirubin 0.2  0.4  0.3    Alkaline Phosphatase 58  66  58    AST (SGOT) 18  24  19    ALT (SGPT) 18  19  17    Albumin/Globulin Ratio 1.6  1.8  2.0    BUN/Creatinine Ratio 16.4  19.7  15.8    Anion Gap 11.0  11.2  9.5      []  Microbiology  []  Radiology  []  EKG/Telemetry   []  Cardiology/Vascular   []  Pathology  []  Old records  []  Other:    Assessment & Plan   Assessment / Plan     Assessment:  COPD with acute exacerbation  Anxiety  Hyperlipidemia  Hypertension  Hypothyroidism  Eosinophilia     Plan:  Admit patient to the hospital for further workup and management of above  Continue bronchodilators, Brovana, Pulmicort, DuoNebs, as needed albuterol  COVID-19, RSV and influenza negative  Continue prednisone  Continue to hold home antihypertensives, resume as needed  Continue home anxiety lytics  Extended viral respiratory panel negative  Strongyloides is pending  IgE levels pending  Discontinue Rocephin, patient started on chronic azithromycin for COPD exacerbation suppression  Chest x-ray personally reviewed, no dense infiltrate noted  Consult pulmonology given multiple hospitalizations for COPD, discussed management plan with Dr. Ponce regarding COPD  Wean O2 for SpO2 greater than 88%  CBC, CMP reviewed 4/22/2024   Repeat CBC, CMP, mag and Phos in a.m. 4/22/2024   RT case management consult for possible transition to nebulizers  Bedside spirometry today  Patient would likely benefit from 5 days of prednisone to store at home when she starts to have exacerbations, this would likely keep her out of the hospital.     Reviewed patients labs and imaging, and discussed with patient and nurse at bedside.    DVT prophylaxis:  Medical and  mechanical DVT prophylaxis orders are present.        CODE STATUS:   Code Status (Patient has no pulse and is not breathing): CPR (Attempt to Resuscitate)  Medical Interventions (Patient has pulse or is breathing): Full Support        Electronically signed by Rico Vargas MD, 04/22/24, 1:30 PM EDT.

## 2024-04-22 NOTE — PLAN OF CARE
Problem: Adult Inpatient Plan of Care  Goal: Plan of Care Review  Outcome: Ongoing, Progressing  Flowsheets (Taken 4/22/2024 1541)  Progress: no change  Plan of Care Reviewed With: patient  Outcome Evaluation: Patient is alert and oriented x4. Patient has had no complaints this shift. Patient has no complaints at this time.  Goal: Patient-Specific Goal (Individualized)  Outcome: Ongoing, Progressing  Goal: Absence of Hospital-Acquired Illness or Injury  Outcome: Ongoing, Progressing  Intervention: Identify and Manage Fall Risk  Recent Flowsheet Documentation  Taken 4/22/2024 1409 by Maggy Sterling RN  Safety Promotion/Fall Prevention:   nonskid shoes/slippers when out of bed   safety round/check completed  Taken 4/22/2024 1215 by Maggy Sterling RN  Safety Promotion/Fall Prevention:   nonskid shoes/slippers when out of bed   safety round/check completed  Taken 4/22/2024 1015 by Maggy Sterling RN  Safety Promotion/Fall Prevention:   nonskid shoes/slippers when out of bed   safety round/check completed  Taken 4/22/2024 0817 by Maggy Sterling RN  Safety Promotion/Fall Prevention:   assistive device/personal items within reach   clutter free environment maintained   lighting adjusted   nonskid shoes/slippers when out of bed   room organization consistent   safety round/check completed  Taken 4/22/2024 0715 by Maggy Sterling RN  Safety Promotion/Fall Prevention:   nonskid shoes/slippers when out of bed   safety round/check completed  Goal: Optimal Comfort and Wellbeing  Outcome: Ongoing, Progressing  Intervention: Provide Person-Centered Care  Recent Flowsheet Documentation  Taken 4/22/2024 0817 by Maggy Sterling RN  Trust Relationship/Rapport:   care explained   choices provided   emotional support provided   empathic listening provided   questions answered   questions encouraged   reassurance provided   thoughts/feelings acknowledged  Goal: Readiness for Transition of Care  Outcome: Ongoing, Progressing      Problem: Pain Acute  Goal: Acceptable Pain Control and Functional Ability  Outcome: Ongoing, Progressing  Intervention: Prevent or Manage Pain  Recent Flowsheet Documentation  Taken 4/22/2024 0817 by Maggy Sterling RN  Medication Review/Management:   medications reviewed   high-risk medications identified     Problem: Fall Injury Risk  Goal: Absence of Fall and Fall-Related Injury  Outcome: Ongoing, Progressing  Intervention: Identify and Manage Contributors  Recent Flowsheet Documentation  Taken 4/22/2024 0817 by Maggy Stelring RN  Medication Review/Management:   medications reviewed   high-risk medications identified  Intervention: Promote Injury-Free Environment  Recent Flowsheet Documentation  Taken 4/22/2024 1409 by Maggy Sterling RN  Safety Promotion/Fall Prevention:   nonskid shoes/slippers when out of bed   safety round/check completed  Taken 4/22/2024 1215 by Maggy Sterling RN  Safety Promotion/Fall Prevention:   nonskid shoes/slippers when out of bed   safety round/check completed  Taken 4/22/2024 1015 by Maggy Sterling RN  Safety Promotion/Fall Prevention:   nonskid shoes/slippers when out of bed   safety round/check completed  Taken 4/22/2024 0817 by Maggy Sterling RN  Safety Promotion/Fall Prevention:   assistive device/personal items within reach   clutter free environment maintained   lighting adjusted   nonskid shoes/slippers when out of bed   room organization consistent   safety round/check completed  Taken 4/22/2024 0715 by Maggy Sterling, RN  Safety Promotion/Fall Prevention:   nonskid shoes/slippers when out of bed   safety round/check completed     Problem: Adjustment to Illness COPD (Chronic Obstructive Pulmonary Disease)  Goal: Optimal Chronic Illness Coping  Outcome: Ongoing, Progressing     Problem: Functional Ability Impaired COPD (Chronic Obstructive Pulmonary Disease)  Goal: Optimal Level of Functional [place]  Outcome: Ongoing, Progressing     Problem: Infection COPD  (Chronic Obstructive Pulmonary Disease)  Goal: Absence of Infection Signs and Symptoms  Outcome: Ongoing, Progressing     Problem: Oral Intake Inadequate COPD (Chronic Obstructive Pulmonary Disease)  Goal: Improved Nutrition Intake  Outcome: Ongoing, Progressing     Problem: Respiratory Compromise COPD (Chronic Obstructive Pulmonary Disease)  Goal: Effective Oxygenation and Ventilation  Outcome: Ongoing, Progressing  Intervention: Optimize Oxygenation and Ventilation  Recent Flowsheet Documentation  Taken 4/22/2024 1409 by Maggy Sterling RN  Head of Bed (HOB) Positioning: HOB elevated  Taken 4/22/2024 1215 by Maggy Sterling RN  Head of Bed (HOB) Positioning: HOB elevated  Taken 4/22/2024 1015 by Maggy Sterling RN  Head of Bed (HOB) Positioning: HOB elevated  Taken 4/22/2024 0715 by Maggy Sterling RN  Head of Bed ([facility]) Positioning: HOB elevated   Goal Outcome Evaluation:  Plan of Care Reviewed With: patient        Progress: no change  Outcome Evaluation: Patient is alert and oriented x4. Patient has had no complaints this shift. Patient has no complaints at this time.

## 2024-04-22 NOTE — CONSULTS
Patient has complete PFT scheduled 5/01.  If spirometry is needed while inpatient to guide inpatient treatment plan, spirometry can be obtained from respiratory therapy services.

## 2024-04-22 NOTE — PROGRESS NOTES
Pulmonary / Critical Care Progress Note      Patient Name: Gloria Sunshine  : 1960  MRN: [id number]  Attending:  Rico Vargas MD  Date of admission: 2024    Subjective   Subjective   Follow-up for COPD exacerbation, recurrent COPD and asthma prolapse with acute exacerbation.    Over last 24 hours,  Remained on nebulizers  Brovana, Pulmicort and DuoNeb.  Remained on prednisone 40 mg once daily.  Continued with daily azithromycin.    No acute events overnight.    This morning,  Still has chest tightness.  Still with some wheezing  Feels breathing treatments are helping  Weaning off oxygen.  Has no fever or chills.  No nausea or vomiting    ROS:  General:  Fatigue, No Fever  HEENT: No dysphagia, No Visual Changes, no rhinorrhea  Respiratory:  + cough,+Dyspnea, no phlegm, No Pleuritic Pain, + wheezing, no hemoptysis  Cardiovascular: Denies chest pain, denies palpitations,+GUDINO, No Chest Pressure  Gastrointestinal:  No Abdominal Pain, No Nausea, No Vomiting, No Diarrhea  Genitourinary:  No Dysuria, No Frequency, No Hesitancy  Musculoskeletal: No muscle pain or swelling  Endocrine:  No Heat Intolerance, No Cold Intolerance, Fatigue  Neurologic:  No Confusion, no Dysarthria, No Headaches  Skin:  No Rash, No Open Wounds        Objective   Objective     Vitals:   Temp:  [98.2 °F (36.8 °C)-98.6 °F (37 °C)] 98.5 °F (36.9 °C)  Heart Rate:  [68-90] 75  Resp:  [16-20] 20  BP: (114-136)/(66-80) 131/68  Flow (L/min):  [2] 2    Physical Exam:  Vital Signs Reviewed  General WDWN, Alert, in mild distress, has conversational dyspnea.    Chest:  poor aeration, still with some expiratory wheezing, no crackles or rhonchi, resonant to percussion bilaterally  CV: RRR, no MGR, .  EXT:  no clubbing, no cyanosis, no edema, no joint tenderness  Neuro:  A&Ox3, CN grossly intact, no focal deficits.  Skin: No rashes or lesions noted    Result Review    Result Review:  I have personally reviewed the results from the  time of this admission to 4/22/2024 07:51 EDT and agree with these findings:  [x]  Laboratory  [x]  Microbiology  []  Radiology  []  EKG/Telemetry   []  Cardiology/Vascular   []  Pathology  []  Old records  []  Other:  Most notable findings include:       Lab 04/18/24  0535 04/17/24  1042   WBC 6.46 6.91   HEMOGLOBIN 11.8* 13.0   HEMATOCRIT 36.9 38.9   PLATELETS 313 333   SODIUM 138 136   POTASSIUM 4.6 4.1   CHLORIDE 100 97*   CO2 28.5 27.8   BUN 9 12   CREATININE 0.57 0.61   GLUCOSE 112* 143*   CALCIUM 9.2 9.7   PHOSPHORUS 3.9  --    TOTAL PROTEIN 6.5 7.2   ALBUMIN 4.3 4.6   GLOBULIN 2.2 2.6     Respiratory viral panel negative        Assessment & Plan   Assessment / Plan     Active Hospital Problems:  Active Hospital Problems    Diagnosis     **COPD (chronic obstructive pulmonary disease)     COPD exacerbation      Impression:  Acute on chronic hypoxemic respiratory failure  Acute exacerbation of COPD with frequent hospitalizations  Peripheral eosinophilia  Tobacco abuse of cigarettes in remission  Hyperglycemia     Plan:  Improving resp status.   Does have peripheral eosinophilia.  IgE pending.  Patient will be a candidate for biological agent as an outpatient.   Will likely need Fasenra or Mepolizumab.  ANCA has been sent and is pending.  Need PFTs as an outpatient.  Will need follow up with pulm as outpatient.   Continue prednisone 40 mg daily.  We will taper it over 2 weeks.    DVT prophylaxis:  Medical and mechanical DVT prophylaxis orders are present.    CODE STATUS:   Code Status (Patient has no pulse and is not breathing): CPR (Attempt to Resuscitate)  Medical Interventions (Patient has pulse or is breathing): Full Support      I have reviewed labs, imaging, pertinent clinical data and provider notes.   I have discussed with bedside nurse and primary service.     Electronically signed by Derek Cox MD, 04/22/24, 7:51 AM EDT.

## 2024-04-23 ENCOUNTER — READMISSION MANAGEMENT (OUTPATIENT)
Dept: CALL CENTER | Facility: HOSPITAL | Age: 64
End: 2024-04-23
Payer: COMMERCIAL

## 2024-04-23 VITALS
DIASTOLIC BLOOD PRESSURE: 82 MMHG | TEMPERATURE: 98.2 F | WEIGHT: 124.12 LBS | OXYGEN SATURATION: 96 % | RESPIRATION RATE: 16 BRPM | SYSTOLIC BLOOD PRESSURE: 151 MMHG | BODY MASS INDEX: 17.38 KG/M2 | HEART RATE: 66 BPM | HEIGHT: 71 IN

## 2024-04-23 PROCEDURE — 99233 SBSQ HOSP IP/OBS HIGH 50: CPT | Performed by: INTERNAL MEDICINE

## 2024-04-23 PROCEDURE — 94799 UNLISTED PULMONARY SVC/PX: CPT

## 2024-04-23 PROCEDURE — 99239 HOSP IP/OBS DSCHRG MGMT >30: CPT | Performed by: INTERNAL MEDICINE

## 2024-04-23 PROCEDURE — 94664 DEMO&/EVAL PT USE INHALER: CPT

## 2024-04-23 PROCEDURE — 63710000001 PREDNISONE PER 1 MG: Performed by: INTERNAL MEDICINE

## 2024-04-23 PROCEDURE — 94760 N-INVAS EAR/PLS OXIMETRY 1: CPT

## 2024-04-23 RX ORDER — AZITHROMYCIN 250 MG/1
TABLET, FILM COATED ORAL
Qty: 30 TABLET | Refills: 0 | Status: SHIPPED | OUTPATIENT
Start: 2024-04-23 | End: 2024-04-23

## 2024-04-23 RX ORDER — PREDNISONE 20 MG/1
40 TABLET ORAL
Qty: 10 TABLET | Refills: 0 | Status: SHIPPED | OUTPATIENT
Start: 2024-04-23 | End: 2024-04-28

## 2024-04-23 RX ORDER — AZITHROMYCIN 250 MG/1
250 TABLET, FILM COATED ORAL DAILY
Qty: 30 TABLET | Refills: 0 | Status: SHIPPED | OUTPATIENT
Start: 2024-04-23 | End: 2024-05-23

## 2024-04-23 RX ADMIN — Medication 10 ML: at 08:31

## 2024-04-23 RX ADMIN — Medication 1 TABLET: at 08:30

## 2024-04-23 RX ADMIN — DOCUSATE SODIUM 50MG AND SENNOSIDES 8.6MG 2 TABLET: 8.6; 5 TABLET, FILM COATED ORAL at 08:31

## 2024-04-23 RX ADMIN — FAMOTIDINE 40 MG: 20 TABLET ORAL at 08:30

## 2024-04-23 RX ADMIN — HYDROCODONE POLISTIREX AND CHLORPHENIRAMINE POLISTIREX 5 ML: 10; 8 SUSPENSION, EXTENDED RELEASE ORAL at 03:31

## 2024-04-23 RX ADMIN — BUDESONIDE 0.5 MG: 0.5 SUSPENSION RESPIRATORY (INHALATION) at 08:16

## 2024-04-23 RX ADMIN — AZITHROMYCIN DIHYDRATE 250 MG: 250 TABLET ORAL at 08:30

## 2024-04-23 RX ADMIN — IPRATROPIUM BROMIDE AND ALBUTEROL SULFATE 3 ML: .5; 3 SOLUTION RESPIRATORY (INHALATION) at 08:16

## 2024-04-23 RX ADMIN — ESCITALOPRAM OXALATE 5 MG: 10 TABLET ORAL at 08:30

## 2024-04-23 RX ADMIN — PREDNISONE 40 MG: 20 TABLET ORAL at 08:30

## 2024-04-23 RX ADMIN — ARFORMOTEROL TARTRATE 15 MCG: 15 SOLUTION RESPIRATORY (INHALATION) at 08:16

## 2024-04-23 NOTE — PLAN OF CARE
Goal Outcome Evaluation:         Pt discharge home self care. Discharge information and medications explained to pt. Pt verbalized understanding.

## 2024-04-23 NOTE — PLAN OF CARE
Goal Outcome Evaluation:  Plan of Care Reviewed With: patient           Outcome Evaluation: Pt has had mostly nonproductive cough this shift. Medicated with Tussionex. No c/o soa.

## 2024-04-23 NOTE — DISCHARGE SUMMARY
[facility]         HOSPITALIST  DISCHARGE SUMMARY    Patient Name: Gloria Sunshine  : 1960  MRN: [id number]    Date of Admission: 2024  Date of Discharge:  2024  Primary Care Physician: Anais May APRN    Consults       Date and Time Order Name Status Description    2024  4:34 PM Inpatient Pulmonology Consult Completed     2024  1:55 PM Inpatient Hospitalist Consult              Active and Resolved Hospital Problems:  COPD with acute exacerbation  Anxiety  Hyperlipidemia  Hypertension  Hypothyroidism  Eosinophilia    Hospital Course     Hospital Course:  Gloria Sunshine is a 63 y.o. female with a past medical history significant for COPD, hyperlipidemia, hypertension, anxiety who presents to the hospital with a several day history of worsening shortness of breath. Patient was recently in our facility last month for COPD exacerbation. Patient states she was doing well for several weeks, patient states that on  she started have more shortness of breath, patient did follow-up with the pulmonology clinic who placed her on Singulair with minimal improvement. Patient presents the emergency department today for worsening respiratory status.  Patient treated with bronchodilators, steroids with good response.  Patient started on chronic azithromycin for COPD suppression.  IgE and Strongyloides levels are pending, these were checked secondary to peripheral eosinophilia, patient is being sent home with a rescue steroid burst prescription of prednisone.  Patient should follow-up with the COPD clinic in 1 week.  Patient to follow-up with her PCP in 3 to 7 days.  Patient is discharged home today in stable condition    Day of Discharge     Vital Signs:  Temp:  [98.1 °F (36.7 °C)-98.7 °F (37.1 °C)] 98.1 °F (36.7 °C)  Heart Rate:  [65-89] 65  Resp:  [16-20] 16  BP: (125-163)/(76-88) 137/77    Physical Exam:   GEN: No acute distress  HEENT: Moist mucous  membranes  LUNGS: Equal chest rise bilaterally  CARDIAC: Regular rate and rhythm  NEURO: Moving all 4 extremities spontaneously  SKIN: No obvious breakdown    Discharge Details        Discharge Medications        New Medications        Instructions Start Date   azithromycin 250 MG tablet  Commonly known as: ZITHROMAX   Indications: COPD Exacerbation Suppression      predniSONE 20 MG tablet  Commonly known as: DELTASONE   40 mg, Oral, Daily With Breakfast             Continue These Medications        Instructions Start Date   albuterol (2.5 MG/3ML) 0.083% nebulizer solution  Commonly known as: PROVENTIL   2.5 mg, Nebulization, Every 4 Hours PRN      albuterol sulfate  (90 Base) MCG/ACT inhaler  Commonly known as: PROVENTIL HFA;VENTOLIN HFA;PROAIR HFA   Inhale 2 puffs into the lungs every 4 (four) hours as needed for Shortness of Air.      atorvastatin 20 MG tablet  Commonly known as: LIPITOR   Take 1 tablet by mouth nightly.      budesonide-formoterol 80-4.5 MCG/ACT inhaler  Commonly known as: SYMBICORT   2 puffs, Inhalation, 2 Times Daily      escitalopram 5 MG tablet  Commonly known as: LEXAPRO   Take 1 tablet by mouth daily.      hydroCHLOROthiazide 25 MG tablet   Take 1 tablet by mouth daily.      hydrOXYzine 25 MG tablet  Commonly known as: ATARAX   Take 1 tablet by mouth every 8 (eight) hours as needed for Anxiety.      mirtazapine 7.5 MG tablet  Commonly known as: REMERON   Take 1 tablet by mouth nightly.      montelukast 10 MG tablet  Commonly known as: SINGULAIR   10 mg, Oral, Daily      multivitamin with minerals tablet tablet   1 tablet, Oral, Daily      Spiriva Respimat 1.25 MCG/ACT aerosol solution inhaler  Generic drug: Tiotropium Bromide Monohydrate   2 puffs, Inhalation, Daily - RT               Allergies   Allergen Reactions    Influenza Vaccines Swelling     Patient got gouter on neck        Discharge Disposition:  Home or Self Care    Diet:  Hospital:  Diet Order   Procedures    Diet:  Regular/House; Fluid Consistency: Thin (IDDSI 0)       Discharge Activity:       CODE STATUS:  Code Status and Medical Interventions:   Ordered at: 04/17/24 1402     Code Status (Patient has no pulse and is not breathing):    CPR (Attempt to Resuscitate)     Medical Interventions (Patient has pulse or is breathing):    Full Support       Future Appointments   Date Time Provider Department Center   4/30/2024  8:00 AM Erica Freeman APRN [facility] PCC ETW [facility]   5/1/2024  9:00 AM [facility] PULM LAB ROOM 2 [facility] PFT [facility]   7/16/2024  9:45 AM Yaya Dover MD [facility] CD ETOWN [facility]       Additional Instructions for the Follow-ups that You Need to Schedule       Discharge Follow-up with PCP   As directed       Currently Documented PCP:    Anais May APRN    PCP Phone Number:    [phone]     Follow Up Details: 3 to 7 days        Discharge Follow-up with Specified Provider: pulm; 2 Weeks   As directed      To: pulm   Follow Up: 2 Weeks                Pertinent  and/or Most Recent Results     IMAGING:  XR Chest 1 View    Result Date: 4/17/2024   DATE OF EXAM: 4/17/2024 10:50 AM  PROCEDURE: XR CHEST 1 VW-  INDICATIONS: Chest Pain Triage Protocol  COMPARISON: 3/23/2024 and prior  TECHNIQUE: Portable Chest  FINDINGS:  Lungs are hyperexpanded with emphysematous changes again noted with an upper lung zone predominance. Heart mediastinal contours appear stable with prominence of the central pulmonary vascularity. No focal consolidation noted. Blunting at the costophrenic angle is favored to represent sequela of hyperinflation. No focal consolidation noted. No acute osseous abnormality      IMPRESSION : Hyperexpansion of the lungs, emphysema.  No focal consolidation [  Electronically Signed By-Carloz Smith On:4/17/2024 11:04 AM        LAB RESULTS:      Lab 04/18/24  0535 04/17/24  1523 04/17/24  1042   WBC 6.46  --  6.91   HEMOGLOBIN 11.8*  --  13.0   HEMATOCRIT 36.9  --  38.9   PLATELETS 313  --  333   NEUTROS ABS 3.73  --   4.05   IMMATURE GRANS (ABS) 0.02  --  0.02   LYMPHS ABS 1.80  --  1.68   MONOS ABS 0.88  --  0.63   EOS ABS 0.01  --  0.48*   MCV 86.2  --  85.1   LACTATE  --  1.4  --          Lab 04/18/24  0535 04/17/24  1042   SODIUM 138 136   POTASSIUM 4.6 4.1   CHLORIDE 100 97*   CO2 28.5 27.8   ANION GAP 9.5 11.2   BUN 9 12   CREATININE 0.57 0.61   EGFR 102.9 101.2   GLUCOSE 112* 143*   CALCIUM 9.2 9.7   MAGNESIUM 2.1 2.0   PHOSPHORUS 3.9  --          Lab 04/18/24  0535 04/17/24  1042   TOTAL PROTEIN 6.5 7.2   ALBUMIN 4.3 4.6   GLOBULIN 2.2 2.6   ALT (SGPT) 17 19   AST (SGOT) 19 24   BILIRUBIN 0.3 0.4   ALK PHOS 58 66   LIPASE  --  37         Lab 04/17/24  1400 04/17/24  1042   PROBNP  --  206.2   HSTROP T 7 7                 Brief Urine Lab Results       None          Microbiology Results (last 10 days)       Procedure Component Value - Date/Time    Respiratory Panel PCR w/COVID-19(SARS-CoV-2) JONATHAN/DOMINICK/MOR/PAD/COR/LOLY In-House, NP Swab in UTM/VTM, 2 HR TAT - Swab, Nasopharynx [[id number]]  (Normal) Collected: 04/18/24 0845    Lab Status: Final result Specimen: Swab from Nasopharynx Updated: 04/18/24 1037     ADENOVIRUS, PCR Not Detected     Coronavirus 229E Not Detected     Coronavirus HKU1 Not Detected     Coronavirus NL63 Not Detected     Coronavirus OC43 Not Detected     COVID19 Not Detected     Human Metapneumovirus Not Detected     Human Rhinovirus/Enterovirus Not Detected     Influenza A PCR Not Detected     Influenza B PCR Not Detected     Parainfluenza Virus 1 Not Detected     Parainfluenza Virus 2 Not Detected     Parainfluenza Virus 3 Not Detected     Parainfluenza Virus 4 Not Detected     RSV, PCR Not Detected     Bordetella pertussis pcr Not Detected     Bordetella parapertussis PCR Not Detected     Chlamydophila pneumoniae PCR Not Detected     Mycoplasma pneumo by PCR Not Detected    Narrative:      In the setting of a positive respiratory panel with a viral infection PLUS a negative procalcitonin without other  underlying concern for bacterial infection, consider observing off antibiotics or discontinuation of antibiotics and continue supportive care. If the respiratory panel is positive for atypical bacterial infection (Bordetella pertussis, Chlamydophila pneumoniae, or Mycoplasma pneumoniae), consider antibiotic de-escalation to target atypical bacterial infection.    Blood Culture - Blood, Arm, Right [[id number]]  (Normal) Collected: 04/17/24 1523    Lab Status: Final result Specimen: Blood from Arm, Right Updated: 04/22/24 1545     Blood Culture No growth at 5 days    Blood Culture - Blood, Arm, Left [[id number]]  (Normal) Collected: 04/17/24 1523    Lab Status: Final result Specimen: Blood from Arm, Left Updated: 04/22/24 1545     Blood Culture No growth at 5 days    COVID-19, FLU A/B, RSV PCR 1 HR TAT - Swab, Nasopharynx [[id number]]  (Normal) Collected: 04/17/24 1050    Lab Status: Final result Specimen: Swab from Nasopharynx Updated: 04/17/24 1140     COVID19 Not Detected     Influenza A PCR Not Detected     Influenza B PCR Not Detected     RSV, PCR Not Detected    Narrative:      Fact sheet for providers: https://www.fda.gov/media/130133/download    Fact sheet for patients: https://www.fda.gov/media/863645/download    Test performed by PCR.              Results for orders placed during the hospital encounter of 07/21/23    Adult Transthoracic Echo Complete W/ Cont if Necessary Per Protocol    Interpretation Summary    Left ventricular ejection fraction appears to be 51 - 55%.    Estimated right ventricular systolic pressure from tricuspid regurgitation is mildly elevated (35-45 mmHg).    No singificant valve abnormalities    No pericardial effusion      Time spent on Discharge including face to face service: Greater than 30 minutes      Electronically signed by Rico Vargas MD, 04/23/24, 9:41 AM EDT.

## 2024-04-23 NOTE — PROGRESS NOTES
Pulmonary / Critical Care Progress Note      Patient Name: Gloria Sunshine  : 1960  MRN: [id number]  Attending:  Rico Vargas MD  Date of admission: 2024    Subjective   Subjective   Follow-up for COPD exacerbation, recurrent COPD and asthma prolapse with acute exacerbation.    Over last 24 hours,  Remained on nebulizers.  Continued with Brovana, Pulmicort and DuoNeb.  Remained on prednisone 40 mg once daily.  Continued with daily azithromycin.    No acute events overnight.    This morning,  Still has chest tightness.  Still with some wheezing  Feels breathing treatments are helping  Weaning off oxygen.  Has no fever or chills.  No nausea or vomiting    ROS:  General:  Fatigue, No Fever  HEENT: No dysphagia, No Visual Changes, no rhinorrhea  Respiratory:  + cough,+Dyspnea, no phlegm, No Pleuritic Pain, + wheezing, no hemoptysis  Cardiovascular: Denies chest pain, denies palpitations,+GUDINO, No Chest Pressure  Gastrointestinal:  No Abdominal Pain, No Nausea, No Vomiting, No Diarrhea  Genitourinary:  No Dysuria, No Frequency, No Hesitancy  Musculoskeletal: No muscle pain or swelling  Endocrine:  No Heat Intolerance, No Cold Intolerance, Fatigue  Neurologic:  No Confusion, no Dysarthria, No Headaches  Skin:  No Rash, No Open Wounds        Objective   Objective     Vitals:   Temp:  [98.1 °F (36.7 °C)-98.7 °F (37.1 °C)] 98.1 °F (36.7 °C)  Heart Rate:  [65-89] 65  Resp:  [16-20] 16  BP: (125-163)/(76-88) 137/77    Physical Exam:  Vital Signs Reviewed  General WDWN, Alert, in mild distress, has conversational dyspnea.    Chest:  poor aeration, still with some expiratory wheezing, no crackles or rhonchi, resonant to percussion bilaterally  CV: RRR, no MGR, .  EXT:  no clubbing, no cyanosis, no edema, no joint tenderness  Neuro:  A&Ox3, CN grossly intact, no focal deficits.  Skin: No rashes or lesions noted    Result Review    Result Review:  I have personally reviewed the results from the time  of this admission to 4/23/2024 07:55 EDT and agree with these findings:  [x]  Laboratory  [x]  Microbiology  []  Radiology  []  EKG/Telemetry   []  Cardiology/Vascular   []  Pathology  []  Old records  []  Other:  Most notable findings include:       Lab 04/18/24  0535 04/17/24  1042   WBC 6.46 6.91   HEMOGLOBIN 11.8* 13.0   HEMATOCRIT 36.9 38.9   PLATELETS 313 333   SODIUM 138 136   POTASSIUM 4.6 4.1   CHLORIDE 100 97*   CO2 28.5 27.8   BUN 9 12   CREATININE 0.57 0.61   GLUCOSE 112* 143*   CALCIUM 9.2 9.7   PHOSPHORUS 3.9  --    TOTAL PROTEIN 6.5 7.2   ALBUMIN 4.3 4.6   GLOBULIN 2.2 2.6     Respiratory viral panel negative        Assessment & Plan   Assessment / Plan     Active Hospital Problems:  Active Hospital Problems    Diagnosis     **COPD (chronic obstructive pulmonary disease)     COPD exacerbation      Impression:  Acute on chronic hypoxemic respiratory failure  Acute exacerbation of COPD with frequent hospitalizations  Peripheral eosinophilia  Tobacco abuse of cigarettes in remission  Hyperglycemia     Plan:  Improving resp status.   Does have peripheral eosinophilia.  IgE pending.  Patient will be a candidate for biological agent as an outpatient.   Patient to follow-up with pulmonary next week.  Would recommend giving 1 dose of mepolizumab and start processing for biologic as an outpatient during pulmonary office visit.  ANCA has been sent and is pending.  Need PFTs as an outpatient.  Continue prednisone 40 mg daily.  Recommend taper over 2 weeks.    DVT prophylaxis:  Medical and mechanical DVT prophylaxis orders are present.    CODE STATUS:   Code Status (Patient has no pulse and is not breathing): CPR (Attempt to Resuscitate)  Medical Interventions (Patient has pulse or is breathing): Full Support      I have reviewed labs, imaging, pertinent clinical data and provider notes.   I have discussed with bedside nurse and primary service.     Electronically signed by Derek Cox MD, 04/23/24, 7:55 AM  EDT.

## 2024-04-24 LAB
C-ANCA TITR SER IF: NORMAL TITER
MYELOPEROXIDASE AB SER IA-ACNC: <0.2 UNITS (ref 0–0.9)
P-ANCA ATYPICAL TITR SER IF: NORMAL TITER
P-ANCA TITR SER IF: NORMAL TITER
PROTEINASE3 AB SER IA-ACNC: <0.2 UNITS (ref 0–0.9)

## 2024-04-24 NOTE — OUTREACH NOTE
Prep Survey      Flowsheet Row Responses   [facility] facility patient discharged from? [facility]   Is LACE score < 7 ? No   Eligibility Readm Mgmt   Discharge diagnosis COPD   Does the patient have one of the following disease processes/diagnoses(primary or secondary)? COPD   Does the patient have Home health ordered? No   Is there a DME ordered? No   Prep survey completed? Yes            MADDIE MORENO - Registered Nurse

## 2024-04-25 LAB — STRONGYLOIDES IGG SER QL IA: NEGATIVE

## 2024-04-26 ENCOUNTER — READMISSION MANAGEMENT (OUTPATIENT)
Dept: CALL CENTER | Facility: HOSPITAL | Age: 64
End: 2024-04-26
Payer: COMMERCIAL

## 2024-04-26 NOTE — PROGRESS NOTES
Primary Care Provider  Anais May APRN   Referring Provider  No ref. provider found      Patient Complaint  Hospital Follow Up Visit and Emphysema      Subjective          Gloria Sunshine presents to [facility] PULMONARY & CRITICAL CARE MEDICINE      History of Presenting Illness  Gloria Sunshine is a 63 y.o. female with acute hypoxic respiratory failure, likely COPD, peripheral eosinophilia, chest tightness, and tobacco abuse ongoing, here for hospital follow-up.    Patient was hospitalized at [facility] 4/17/2024 through 4/23/2024 for COPD exacerbation and severe malnutrition.  This is the second time she has been hospitalized for COPD exacerbation this year, fourth time in the last 6 months.  She presented to the emergency department with worsening shortness of breath.  Patient was started on IV steroids and scheduled bronchodilators with good response.  She was also started on chronic daily azithromycin for COPD suppression.  IgE level, ANCA labs pending.  Patient was discharged home in stable condition with instructions to follow-up in COPD clinic in 1 week.  Patient was also prescribed a prednisone burst in case of another exacerbation.    Patient states she has been feeling better since being discharged from the hospital, she reports having completed the prednisone burst that was prescribed at discharge.  Patient denies any current fevers or chills, no known sick contacts.  She has no major respiratory complaints today, does have a cough occasionally productive of a little sputum that is worse at night.  Patient has some anxiety surrounding her breathing, understandable given her multiple hospitalizations in the past year.  Patient continues to use Symbicort and Spiriva daily as well as her albuterol inhaler and albuterol neb treatments as needed.  She has been wearing 1 L supplemental oxygen as needed, but rarely uses it.  We ordered her a POC at  previous visit.  Patient continues to smoke cigarettes but has cut down to only 2 cigarettes since her hospital discharge, 12 pack years.  She has been using nicotine patches.  Patient denies any hemoptysis, swollen lymph nodes, or night sweats.  Patient moved to [place] 16 years ago.  She does have a dog but has had dogs for years with no allergic issues.  Overall, patient is doing well and has no additional concerns at this time.  Patient is able to perform ADLs without difficulty.  She works in healthcare, home health.  I have personally reviewed the review of systems, past family, social, medical and surgical histories; and agree with their findings.      Review of Systems    Review of Systems   Constitutional:  Positive for fatigue (more than usual). Negative for activity change, chills, fever, unexpected weight gain and unexpected weight loss.   HENT:  Negative for congestion, ear discharge, ear pain, mouth sores, postnasal drip, rhinorrhea, sinus pressure, sore throat, swollen glands and trouble swallowing.    Eyes:  Negative for blurred vision, pain, discharge, itching and visual disturbance.   Respiratory:  Positive for cough (dry, worse at night). Negative for apnea, chest tightness, shortness of breath, wheezing and stridor.    Cardiovascular:  Negative for chest pain, palpitations and leg swelling.   Gastrointestinal:  Negative for abdominal distention, abdominal pain, constipation, diarrhea, nausea, vomiting, GERD and indigestion.   Musculoskeletal:  Negative for arthralgias, joint swelling and myalgias.   Skin:  Negative for color change.   Neurological:  Negative for dizziness, weakness, light-headedness and headache.      Sleep: Negative for Excessive daytime sleepiness  Negative for morning headaches  Negative for Snoring      Family History   Problem Relation Age of Onset    Breast cancer Neg Hx     Ovarian cancer Neg Hx     Uterine cancer Neg Hx     Colon cancer Neg Hx     Melanoma Neg Hx          Social History     Socioeconomic History    Marital status:    Tobacco Use    Smoking status: Every Day     Current packs/day: 0.25     Average packs/day: 0.3 packs/day for 46.0 years (11.5 ttl pk-yrs)     Types: Cigarettes    Smokeless tobacco: Never    Tobacco comments:     In the past week has had 2 cigs   Vaping Use    Vaping status: Never Used   Substance and Sexual Activity    Alcohol use: Yes     Comment: occasionally    Drug use: Never    Sexual activity: Yes     Partners: Male     Birth control/protection: Tubal ligation        Past Medical History:   Diagnosis Date    Anxiety     COPD (chronic obstructive pulmonary disease)     Elevated cholesterol     Hyperlipidemia     Hypertension         Immunization History   Administered Date(s) Administered    COVID-19 (MODERNA) 1st,2nd,3rd Dose Monovalent 03/06/2021, 04/02/2021    COVID-19 (MODERNA) Monovalent Original Booster 12/28/2021    COVID-19 (PFIZER) BIVALENT 12+YRS 07/01/2023    Fluzone (or Fluarix & Flulaval for VFC) >6mos 10/08/2019, 01/26/2021, 12/26/2022    Pneumococcal Polysaccharide (PPSV23) 09/22/2020    Shingrix 04/06/2021    TD Preservative Free (Tenivac) 01/05/2024    Td (TDVAX) 07/05/1996, 06/18/2001    Tdap 10/07/2020       Allergies   Allergen Reactions    Influenza Vaccines Swelling     Patient got gouter on neck           Current Outpatient Medications:     albuterol (PROVENTIL) (2.5 MG/3ML) 0.083% nebulizer solution, Take 2.5 mg by nebulization Every 4 (Four) Hours As Needed for Wheezing or Shortness of Air., Disp: 180 mL, Rfl: 0    albuterol sulfate  (90 Base) MCG/ACT inhaler, Inhale 2 puffs into the lungs every 4 (four) hours as needed for Shortness of Air., Disp: 18 g, Rfl: 11    atorvastatin (LIPITOR) 20 MG tablet, Take 1 tablet by mouth nightly., Disp: 90 tablet, Rfl: 1    azithromycin (ZITHROMAX) 250 MG tablet, Take 1 tablet by mouth Daily for 30 days. Indications: COPD Exacerbation Suppression, Disp: 30 tablet, Rfl:  "0    budesonide-formoterol (SYMBICORT) 80-4.5 MCG/ACT inhaler, Inhale 2 puffs 2 (Two) Times a Day., Disp: 1 each, Rfl: 11    escitalopram (LEXAPRO) 5 MG tablet, Take 1 tablet by mouth daily., Disp: 30 tablet, Rfl: 5    hydroCHLOROthiazide 25 MG tablet, Take 1 tablet by mouth daily., Disp: 90 tablet, Rfl: 1    hydrOXYzine (ATARAX) 25 MG tablet, Take 1 tablet by mouth every 8 (eight) hours as needed for Anxiety., Disp: 180 tablet, Rfl: 1    mirtazapine (REMERON) 7.5 MG tablet, Take 1 tablet by mouth nightly., Disp: 90 tablet, Rfl: 1    montelukast (SINGULAIR) 10 MG tablet, Take 1 tablet by mouth Daily., Disp: , Rfl:     multivitamin with minerals tablet tablet, Take 1 tablet by mouth Daily., Disp: , Rfl:     nicotine (NICODERM CQ) 21 MG/24HR patch, Place 1 patch on the skin as directed by provider Daily., Disp: , Rfl:     Tiotropium Bromide Monohydrate (Spiriva Respimat) 1.25 MCG/ACT aerosol solution inhaler, Inhale 2 puffs Daily., Disp: 1 each, Rfl: 11    benzonatate (TESSALON) 100 MG capsule, Take 1 capsule by mouth 3 (Three) Times a Day As Needed for Cough., Disp: 42 capsule, Rfl: 0    predniSONE (DELTASONE) 20 MG tablet, Take 2 tablets by mouth Daily., Disp: 10 tablet, Rfl: 0    promethazine-dextromethorphan (PROMETHAZINE-DM) 6.25-15 MG/5ML syrup, Take 5 mL by mouth 4 (Four) Times a Day As Needed for Cough., Disp: 473 mL, Rfl: 0     Objective     Vital Signs:   /82 (BP Location: Left arm, Patient Position: Sitting, Cuff Size: Adult)   Pulse 93   Temp 98.3 °F (36.8 °C) (Oral)   Resp 16   Ht 180.3 cm (71\")   Wt 57 kg (125 lb 9.6 oz)   SpO2 98% Comment: room air/ has 1.5L O2 PRN  BMI 17.52 kg/m²     Physical Exam  Constitutional:       General: She is not in acute distress.     Appearance: Normal appearance. She is normal weight. She is not ill-appearing.   HENT:      Right Ear: Tympanic membrane and ear canal normal.      Left Ear: Tympanic membrane and ear canal normal.      Nose: Nose normal.      " Mouth/Throat:      Mouth: Mucous membranes are moist.      Pharynx: Oropharynx is clear.   Eyes:      Extraocular Movements: Extraocular movements intact.      Conjunctiva/sclera: Conjunctivae normal.      Pupils: Pupils are equal, round, and reactive to light.   Cardiovascular:      Rate and Rhythm: Normal rate and regular rhythm.      Pulses: Normal pulses.      Heart sounds: Normal heart sounds.   Pulmonary:      Effort: Pulmonary effort is normal. No respiratory distress.      Breath sounds: Normal breath sounds. No stridor. No wheezing, rhonchi or rales.   Abdominal:      General: Bowel sounds are normal.      Palpations: Abdomen is soft.   Musculoskeletal:         General: No swelling. Normal range of motion.      Cervical back: Normal range of motion and neck supple.      Right lower leg: No edema.      Left lower leg: No edema.   Skin:     General: Skin is warm and dry.   Neurological:      General: No focal deficit present.      Mental Status: She is alert and oriented to person, place, and time.      Motor: No weakness.   Psychiatric:         Mood and Affect: Mood normal.         Behavior: Behavior normal.       Result Review :   I have personally reviewed patient's labs and images.  I also reviewed my last office note 2/29/2024, Dr. Cox's hospital progress note 4/23/2024, and Dr. Vargas' discharge summary 4/23/2024.       Diagnoses and all orders for this visit:    1. Acute respiratory failure with hypoxia (Primary)  -     predniSONE (DELTASONE) 20 MG tablet; Take 2 tablets by mouth Daily.  Dispense: 10 tablet; Refill: 0    2. COPD exacerbation    3. Pulmonary emphysema, unspecified emphysema type    4. Peripheral eosinophilia    5. Dyspnea, unspecified type  -     predniSONE (DELTASONE) 20 MG tablet; Take 2 tablets by mouth Daily.  Dispense: 10 tablet; Refill: 0    6. Subacute cough  -     benzonatate (TESSALON) 100 MG capsule; Take 1 capsule by mouth 3 (Three) Times a Day As Needed for Cough.   Dispense: 42 capsule; Refill: 0  -     promethazine-dextromethorphan (PROMETHAZINE-DM) 6.25-15 MG/5ML syrup; Take 5 mL by mouth 4 (Four) Times a Day As Needed for Cough.  Dispense: 473 mL; Refill: 0    7. Tobacco abuse    8. Encounter for smoking cessation counseling      Impression and Plan    -CTA 1/28/2024 showed marked emphysema, stable scarring in the left lung base.  No lung nodules evident.  No lymphadenopathy.  Negative for PE.  -Echocardiogram 7/21/2023 EF 51 to 55%, eRVSP mildly elevated 35 to 45 mmHg  -Peripheral eosinophilia 480 on 4/17/2024  -IgE level elevated, 597 on 4/20/2024  -Per Dr. Cox's [facility] note, we recommend patient be started on biologic therapy.  PFTs scheduled for 5/1/2024.  Spoke with Adelina who is working on insurance approval.  -Prednisone burst prescribed today for patient to have on hand in case of exacerbation  -Continue wearing 1 L supplemental oxygen to keep O2 saturation at or greater than 90%.  Patient has a POC.  -Continue using Symbicort twice daily, reminded patient to rinse mouth after each use  -Continue using Spiriva once daily  -Continue using albuterol inhaler and albuterol neb treatments as needed  -Continue taking chronic daily azithromycin  -Tessalon Perles and promethazine cough syrup prescribed to take as needed for cough  -Smoking cessation counseling provided.  I spent 5 minutes today counseling patient on the risks of smoking, including throat cancer, lung cancer, COPD, heart disease and death.  Also discussed the benefits of quitting.  -Follow-up in 2 weeks after PFTs completed, hopefully will be able to administer first dose of biologic    Smoking status: Reviewed  Vaccination status: Patient reports she is up-to-date with her Covid vaccines, declines pneumonia vaccine, allergic to flu vaccine.  Patient is advised to continue to follow CDC recommendations such as social distancing wearing a mask and washing hands for at least 20 seconds.  Medications  personally reviewed    Follow Up   No follow-ups on file.  Patient was given instructions and counseling regarding her condition or for health maintenance advice. Please see specific information pulled into the AVS if appropriate.

## 2024-04-26 NOTE — OUTREACH NOTE
COPD/PN Week 1 Survey      Flowsheet Row Responses   [facility] [place], operated by [facility] patient discharged from? [facility]   Does the patient have one of the following disease processes/diagnoses(primary or secondary)? COPD   Week 1 attempt successful? Yes   Call start time 1803   Call end time 1809   Discharge diagnosis COPD   Person spoke with today (if not patient) and relationship spouse   Meds reviewed with patient/caregiver? Yes   Is the patient having any side effects they believe may be caused by any medication additions or changes? No   Does the patient have all medications ordered at discharge? Yes   Is the patient taking all medications as directed (includes completed medication regime)? Yes   Does the patient have a primary care provider?  Yes   Does the patient have an appointment with their PCP or specialist within 7 days of discharge? Yes   Has the patient kept scheduled appointments due by today? N/A   DME comments home oxygen, wears 2LO2, uses nebulizer BID   Pulse Ox monitoring Intermittent   Pulse Ox device source Patient   O2 Sat comments 92-96% O2 sats   Psychosocial issues? No   Did the patient receive a copy of their discharge instructions? Yes   Nursing interventions Reviewed instructions with patient   What is the patient's perception of their health status since discharge? Improving   Nursing Interventions Nurse provided patient education   If the patient is a current smoker, are they able to teach back resources for cessation? Not a smoker   Is the patient/caregiver able to teach back the hierarchy of who to call/visit for symptoms/problems? PCP, Specialist, Home health nurse, Urgent Care, ED, 911 Yes   Is the patient able to teach back COPD zones? Yes   Nursing interventions Education provided on various zones   Patient reports what zone on this call? Green Zone   Green Zone Reports doing well, Breathing without shortness of breath, Usual activity and exercise level, Usual amounts of cough and phlegm/mucous,  Slept well last night, Appetite is good   Green Zone interventions: Take daily medications, Use oxygen as prescribed   Week 1 call completed? Yes   Is the patient interested in additional calls from an ambulatory ? No   Would this patient benefit from a Referral to [facility] Social Work? No   Wrap up additional comments Pt states she is still having cough attacks. Pt advised to get a humidifier to help with cough.Reviewed AVS/meds with pt. Pt verified PCP, Pulmonologist fu appts.   Call end time 1809            Lenora SALES - Registered Nurse

## 2024-04-27 LAB
QT INTERVAL: 328 MS
QT INTERVAL: 371 MS
QTC INTERVAL: 437 MS
QTC INTERVAL: 474 MS

## 2024-04-28 LAB — IGE SERPL-ACNC: 597 IU/ML (ref 6–495)

## 2024-04-30 ENCOUNTER — OFFICE VISIT (OUTPATIENT)
Dept: PULMONOLOGY | Facility: CLINIC | Age: 64
End: 2024-04-30
Payer: COMMERCIAL

## 2024-04-30 VITALS
SYSTOLIC BLOOD PRESSURE: 116 MMHG | WEIGHT: 125.6 LBS | OXYGEN SATURATION: 98 % | RESPIRATION RATE: 16 BRPM | TEMPERATURE: 98.3 F | HEART RATE: 93 BPM | BODY MASS INDEX: 17.58 KG/M2 | DIASTOLIC BLOOD PRESSURE: 82 MMHG | HEIGHT: 71 IN

## 2024-04-30 DIAGNOSIS — J44.1 COPD EXACERBATION: ICD-10-CM

## 2024-04-30 DIAGNOSIS — D72.19 PERIPHERAL EOSINOPHILIA: ICD-10-CM

## 2024-04-30 DIAGNOSIS — Z72.0 TOBACCO ABUSE: ICD-10-CM

## 2024-04-30 DIAGNOSIS — R06.00 DYSPNEA, UNSPECIFIED TYPE: ICD-10-CM

## 2024-04-30 DIAGNOSIS — J43.9 PULMONARY EMPHYSEMA, UNSPECIFIED EMPHYSEMA TYPE: ICD-10-CM

## 2024-04-30 DIAGNOSIS — R05.2 SUBACUTE COUGH: ICD-10-CM

## 2024-04-30 DIAGNOSIS — Z71.6 ENCOUNTER FOR SMOKING CESSATION COUNSELING: ICD-10-CM

## 2024-04-30 DIAGNOSIS — J96.01 ACUTE RESPIRATORY FAILURE WITH HYPOXIA: Primary | ICD-10-CM

## 2024-04-30 PROCEDURE — 99214 OFFICE O/P EST MOD 30 MIN: CPT

## 2024-04-30 RX ORDER — NICOTINE 21 MG/24HR
1 PATCH, TRANSDERMAL 24 HOURS TRANSDERMAL EVERY 24 HOURS
COMMUNITY

## 2024-04-30 RX ORDER — DEXTROMETHORPHAN HYDROBROMIDE AND PROMETHAZINE HYDROCHLORIDE 15; 6.25 MG/5ML; MG/5ML
5 SYRUP ORAL 4 TIMES DAILY PRN
Qty: 473 ML | Refills: 0 | Status: SHIPPED | OUTPATIENT
Start: 2024-04-30

## 2024-04-30 RX ORDER — BENZONATATE 100 MG/1
100 CAPSULE ORAL 3 TIMES DAILY PRN
Qty: 42 CAPSULE | Refills: 0 | Status: SHIPPED | OUTPATIENT
Start: 2024-04-30

## 2024-04-30 RX ORDER — PREDNISONE 20 MG/1
40 TABLET ORAL DAILY
Qty: 10 TABLET | Refills: 0 | Status: SHIPPED | OUTPATIENT
Start: 2024-04-30

## 2024-05-01 ENCOUNTER — HOSPITAL ENCOUNTER (OUTPATIENT)
Dept: RESPIRATORY THERAPY | Facility: HOSPITAL | Age: 64
Discharge: HOME | End: 2024-05-01
Admitting: STUDENT IN AN ORGANIZED HEALTH CARE EDUCATION/TRAINING PROGRAM
Payer: COMMERCIAL

## 2024-05-01 DIAGNOSIS — J44.1 COPD WITH ACUTE EXACERBATION: ICD-10-CM

## 2024-05-01 PROCEDURE — 94729 DIFFUSING CAPACITY: CPT

## 2024-05-01 PROCEDURE — 94726 PLETHYSMOGRAPHY LUNG VOLUMES: CPT

## 2024-05-01 PROCEDURE — 94060 EVALUATION OF WHEEZING: CPT

## 2024-05-01 RX ORDER — ALBUTEROL SULFATE 2.5 MG/3ML
2.5 SOLUTION RESPIRATORY (INHALATION) ONCE
Status: COMPLETED | OUTPATIENT
Start: 2024-05-01 | End: 2024-05-01

## 2024-05-01 RX ADMIN — ALBUTEROL SULFATE 2.5 MG: 2.5 SOLUTION RESPIRATORY (INHALATION) at 08:51

## 2024-05-14 ENCOUNTER — OFFICE VISIT (OUTPATIENT)
Dept: PULMONOLOGY | Facility: CLINIC | Age: 64
End: 2024-05-14
Payer: COMMERCIAL

## 2024-05-14 VITALS
RESPIRATION RATE: 18 BRPM | OXYGEN SATURATION: 97 % | DIASTOLIC BLOOD PRESSURE: 77 MMHG | HEART RATE: 84 BPM | BODY MASS INDEX: 17.78 KG/M2 | HEIGHT: 71 IN | WEIGHT: 127 LBS | SYSTOLIC BLOOD PRESSURE: 107 MMHG | TEMPERATURE: 98.6 F

## 2024-05-14 DIAGNOSIS — D72.19 PERIPHERAL EOSINOPHILIA: ICD-10-CM

## 2024-05-14 DIAGNOSIS — R76.8 ELEVATED IGE LEVEL: ICD-10-CM

## 2024-05-14 DIAGNOSIS — J43.9 PULMONARY EMPHYSEMA, UNSPECIFIED EMPHYSEMA TYPE: ICD-10-CM

## 2024-05-14 DIAGNOSIS — Z71.6 ENCOUNTER FOR SMOKING CESSATION COUNSELING: ICD-10-CM

## 2024-05-14 DIAGNOSIS — J44.89 ASTHMA-COPD OVERLAP SYNDROME: Primary | ICD-10-CM

## 2024-05-14 DIAGNOSIS — Z72.0 TOBACCO ABUSE: ICD-10-CM

## 2024-05-14 DIAGNOSIS — R06.00 DYSPNEA, UNSPECIFIED TYPE: ICD-10-CM

## 2024-05-14 DIAGNOSIS — J96.01 ACUTE RESPIRATORY FAILURE WITH HYPOXIA: ICD-10-CM

## 2024-05-14 PROCEDURE — 96372 THER/PROPH/DIAG INJ SC/IM: CPT

## 2024-05-14 PROCEDURE — 99214 OFFICE O/P EST MOD 30 MIN: CPT

## 2024-05-14 RX ORDER — METHYLPREDNISOLONE SODIUM SUCCINATE 125 MG/2ML
62.5 INJECTION, POWDER, LYOPHILIZED, FOR SOLUTION INTRAMUSCULAR; INTRAVENOUS ONCE
Status: COMPLETED | OUTPATIENT
Start: 2024-05-14 | End: 2024-05-14

## 2024-05-14 RX ORDER — AZITHROMYCIN 250 MG/1
250 TABLET, FILM COATED ORAL DAILY
Qty: 30 TABLET | Refills: 5 | Status: SHIPPED | OUTPATIENT
Start: 2024-05-14

## 2024-05-14 RX ORDER — PREDNISONE 10 MG/1
TABLET ORAL
Qty: 31 TABLET | Refills: 0 | Status: SHIPPED | OUTPATIENT
Start: 2024-05-14

## 2024-05-14 RX ADMIN — METHYLPREDNISOLONE SODIUM SUCCINATE 62.5 MG: 125 INJECTION, POWDER, LYOPHILIZED, FOR SOLUTION INTRAMUSCULAR; INTRAVENOUS at 09:24

## 2024-05-14 NOTE — PROGRESS NOTES
Primary Care Provider  Anais May APRN   Referring Provider  No ref. provider found      Patient Complaint  COPD, Acute Respiratory Failure with hypoxia, Follow-up (Discuss Biologic therapy/Patient would like steroid injection), Results (PFT), and Shortness of Breath (On exertion /Patient states she has had issues since thursday)      Subjective          Gloria Sunshine presents to [facility]ULMON[facility]INE      History of Presenting Illness  Gloria Sunshine is a 64 y.o. female with acute hypoxic respiratory failure, asthma/COPD overlap syndrome, peripheral eosinophilia, elevated IgE, and tobacco abuse ongoing, here for 2-week follow-up.    Patient states she has been doing okay since her last visit, here today to go over results of pulmonary function testing and discuss biologic therapy.  She was previously seen by myself 2 weeks ago for hospital follow-up in COPD clinic.  Patient denies any current fevers or chills, no ER visits or hospitalizations for her breathing since she was last seen.  Today, patient reports that her shortness of breath with exertion is slowly getting worse again since finishing her last course of steroids.  She has a cough occasionally productive of a little sputum that is worse at night.  Patient has some anxiety surrounding her breathing, which is understandable given her multiple hospitalizations in the past year.  Patient continues to use Symbicort and Spiriva daily as well as her albuterol inhaler and albuterol neb treatments as needed.  Patient also takes chronic daily azithromycin.  She has been wearing 1 L supplemental oxygen as needed, but rarely uses it.  She has a POC.  Patient is a current cigarette smoker but has not smoked in the past 4 days, 12 pack years.  She has been using nicotine patches.  Patient denies any hemoptysis, swollen lymph nodes, or night sweats.  Patient moved to [place] 16 years ago.  She does  have a dog but has had dogs for years with no allergic issues.  Overall, patient is doing well and has no additional concerns at this time.  Patient is able to perform ADLs without difficulty.  She works in healthcare, home health.  I have personally reviewed the review of systems, past family, social, medical and surgical histories; and agree with their findings.      Review of Systems    Review of Systems   Constitutional:  Positive for fatigue (more than usual). Negative for activity change, chills, fever, unexpected weight gain and unexpected weight loss.   HENT:  Negative for congestion, ear discharge, ear pain, mouth sores, postnasal drip, rhinorrhea, sinus pressure, sore throat, swollen glands and trouble swallowing.    Eyes:  Negative for blurred vision, pain, discharge, itching and visual disturbance.   Respiratory:  Positive for cough (dry, worse at night) and shortness of breath (with exertion, worsening). Negative for apnea, chest tightness, wheezing and stridor.    Cardiovascular:  Negative for chest pain, palpitations and leg swelling.   Gastrointestinal:  Negative for abdominal distention, abdominal pain, constipation, diarrhea, nausea, vomiting, GERD and indigestion.   Musculoskeletal:  Negative for arthralgias, joint swelling and myalgias.   Skin:  Negative for color change.   Neurological:  Negative for dizziness, weakness, light-headedness and headache.      Sleep: Negative for Excessive daytime sleepiness  Negative for morning headaches  Negative for Snoring      Family History   Problem Relation Age of Onset    Breast cancer Neg Hx     Ovarian cancer Neg Hx     Uterine cancer Neg Hx     Colon cancer Neg Hx     Melanoma Neg Hx         Social History     Socioeconomic History    Marital status:    Tobacco Use    Smoking status: Former     Current packs/day: 0.00     Average packs/day: 0.3 packs/day for 46.0 years (11.5 ttl pk-yrs)     Types: Cigarettes     Quit date: 5/10/2024     Years  since quittin.0    Smokeless tobacco: Never    Tobacco comments:     In the past week has had 2 cigs   Vaping Use    Vaping status: Never Used   Substance and Sexual Activity    Alcohol use: Yes     Comment: occasionally    Drug use: Never    Sexual activity: Yes     Partners: Male     Birth control/protection: Tubal ligation        Past Medical History:   Diagnosis Date    Anxiety     COPD (chronic obstructive pulmonary disease)     Elevated cholesterol     Hyperlipidemia     Hypertension         Immunization History   Administered Date(s) Administered    COVID-19 (MODERNA) 1st,2nd,3rd Dose Monovalent 2021, 2021    COVID-19 (MODERNA) Monovalent Original Booster 2021    COVID-19 (PFIZER) BIVALENT 12+YRS 2023    Fluzone (or Fluarix & Flulaval for VFC) >6mos 10/08/2019, 2021, 2022    Pneumococcal Polysaccharide (PPSV23) 2020    Shingrix 2021    TD Preservative Free (Tenivac) 2024    Td (TDVAX) 1996, 2001    Tdap 10/07/2020       Allergies   Allergen Reactions    Influenza Vaccines Swelling     Patient got gouter on neck           Current Outpatient Medications:     albuterol (PROVENTIL) (2.5 MG/3ML) 0.083% nebulizer solution, Take 2.5 mg by nebulization Every 4 (Four) Hours As Needed for Wheezing or Shortness of Air., Disp: 180 mL, Rfl: 0    albuterol sulfate  (90 Base) MCG/ACT inhaler, Inhale 2 puffs into the lungs every 4 (four) hours as needed for Shortness of Air., Disp: 18 g, Rfl: 11    atorvastatin (LIPITOR) 20 MG tablet, Take 1 tablet by mouth nightly., Disp: 90 tablet, Rfl: 1    azithromycin (ZITHROMAX) 250 MG tablet, Take 1 tablet by mouth Daily. Indications: COPD Exacerbation Suppression, Disp: 30 tablet, Rfl: 5    budesonide-formoterol (SYMBICORT) 80-4.5 MCG/ACT inhaler, Inhale 2 puffs 2 (Two) Times a Day., Disp: 1 each, Rfl: 11    escitalopram (LEXAPRO) 5 MG tablet, Take 1 tablet by mouth daily., Disp: 30 tablet, Rfl: 5     "hydroCHLOROthiazide 25 MG tablet, Take 1 tablet by mouth daily., Disp: 90 tablet, Rfl: 1    hydrOXYzine (ATARAX) 25 MG tablet, Take 1 tablet by mouth every 8 (eight) hours as needed for Anxiety., Disp: 180 tablet, Rfl: 1    mirtazapine (REMERON) 7.5 MG tablet, Take 1 tablet by mouth nightly., Disp: 90 tablet, Rfl: 1    montelukast (SINGULAIR) 10 MG tablet, Take 1 tablet by mouth Daily., Disp: , Rfl:     multivitamin with minerals tablet tablet, Take 1 tablet by mouth Daily., Disp: , Rfl:     nicotine (NICODERM CQ) 21 MG/24HR patch, Place 1 patch on the skin as directed by provider Daily., Disp: , Rfl:     promethazine-dextromethorphan (PROMETHAZINE-DM) 6.25-15 MG/5ML syrup, Take 5 mL by mouth 4 (Four) Times a Day As Needed for Cough., Disp: 473 mL, Rfl: 0    Tiotropium Bromide Monohydrate (Spiriva Respimat) 1.25 MCG/ACT aerosol solution inhaler, Inhale 2 puffs Daily., Disp: 1 each, Rfl: 11    benzonatate (TESSALON) 100 MG capsule, Take 1 capsule by mouth 3 (Three) Times a Day As Needed for Cough. (Patient not taking: Reported on 5/14/2024), Disp: 42 capsule, Rfl: 0    predniSONE (DELTASONE) 10 MG tablet, Take 4 tabs daily x 3 days, then take 3 tabs daily x 3 days, then take 2 tabs daily x 3 days, then take 1 tab daily x 3 days, Disp: 31 tablet, Rfl: 0    predniSONE (DELTASONE) 20 MG tablet, Take 2 tablets by mouth Daily. (Patient not taking: Reported on 5/14/2024), Disp: 10 tablet, Rfl: 0    Current Facility-Administered Medications:     methylPREDNISolone sodium succinate (SOLU-Medrol) injection 62.5 mg, 62.5 mg, Intramuscular, Once, Erica Freeman APRN     Objective     Vital Signs:   /77 (BP Location: Left arm, Patient Position: Sitting, Cuff Size: Adult)   Pulse 84   Temp 98.6 °F (37 °C) (Tympanic)   Resp 18   Ht 180.3 cm (71\")   Wt 57.6 kg (127 lb)   SpO2 97% Comment: room air  BMI 17.71 kg/m²     Physical Exam  Constitutional:       General: She is not in acute distress.     Appearance: Normal " appearance. She is normal weight. She is not ill-appearing.   HENT:      Right Ear: Tympanic membrane and ear canal normal.      Left Ear: Tympanic membrane and ear canal normal.      Nose: Nose normal.      Mouth/Throat:      Mouth: Mucous membranes are moist.      Pharynx: Oropharynx is clear.   Eyes:      Extraocular Movements: Extraocular movements intact.      Conjunctiva/sclera: Conjunctivae normal.      Pupils: Pupils are equal, round, and reactive to light.   Cardiovascular:      Rate and Rhythm: Normal rate and regular rhythm.      Pulses: Normal pulses.      Heart sounds: Normal heart sounds.   Pulmonary:      Effort: Pulmonary effort is normal. No respiratory distress.      Breath sounds: No stridor. No wheezing, rhonchi or rales.      Comments: Very tight, diminished throughout  Abdominal:      General: Bowel sounds are normal.      Palpations: Abdomen is soft.   Musculoskeletal:         General: No swelling. Normal range of motion.      Cervical back: Normal range of motion and neck supple.      Right lower leg: No edema.      Left lower leg: No edema.   Skin:     General: Skin is warm and dry.   Neurological:      General: No focal deficit present.      Mental Status: She is alert and oriented to person, place, and time.      Motor: No weakness.   Psychiatric:         Mood and Affect: Mood normal.         Behavior: Behavior normal.       Result Review :   I have personally reviewed patient's labs and images.  I also reviewed my last office note 4/30/2024.       Diagnoses and all orders for this visit:    1. Asthma-COPD overlap syndrome (Primary)  -     methylPREDNISolone sodium succinate (SOLU-Medrol) injection 62.5 mg  -     predniSONE (DELTASONE) 10 MG tablet; Take 4 tabs daily x 3 days, then take 3 tabs daily x 3 days, then take 2 tabs daily x 3 days, then take 1 tab daily x 3 days  Dispense: 31 tablet; Refill: 0  -     azithromycin (ZITHROMAX) 250 MG tablet; Take 1 tablet by mouth Daily.  Indications: COPD Exacerbation Suppression  Dispense: 30 tablet; Refill: 5    2. Acute respiratory failure with hypoxia    3. Pulmonary emphysema, unspecified emphysema type    4. Peripheral eosinophilia    5. Elevated IgE level    6. Dyspnea, unspecified type    7. Tobacco abuse    8. Encounter for smoking cessation counseling      Impression and Plan    -CTA 1/28/2024 showed marked emphysema, stable scarring in the left lung base.  No lung nodules evident.  No lymphadenopathy.  Negative for PE.  -Echocardiogram 7/21/2023 EF 51 to 55%, eRVSP mildly elevated 35 to 45 mmHg  -Peripheral eosinophilia 480 on 4/17/2024  -IgE level elevated, 597 on 4/20/2024  -PFTs 5/1/2024 showed severe airflow obstruction, FEV1 25% of predicted, with significant response to bronchodilator with 22% improvement in FEV1.  Severe air trapping present, DLCO severely reduced 19% of predicted.  -Per Dr. [facility]'s [facility] note, we recommend patient be started on biologic therapy.  Spoke with Adelina who is working on insurance approval.  -Solu-Medrol injection administered in clinic today, prednisone taper prescribed for asthma/COPD exacerbation  -Continue wearing 1 L supplemental oxygen to keep O2 saturation at or greater than 90%.  Patient has a POC.  -Continue using Symbicort twice daily, reminded patient to rinse mouth after each use  -Continue using Spiriva once daily  -Continue using albuterol inhaler and albuterol neb treatments as needed  -Continue taking chronic daily azithromycin, refills sent to patient's pharmacy today  -Continue using Tessalon Perles and promethazine syrup as needed for cough  -Encouraged patient to continue with recent smoking cessation, continue using nicotine patches.  I spent 5 minutes today counseling patient on the risks of smoking, including throat cancer, lung cancer, COPD, heart disease and death.  -Follow-up in 1 month, hopefully patient will have received her first biologic injection by that  time    Smoking status: Reviewed  Vaccination status: Patient reports she is up-to-date with her Covid vaccines, declines pneumonia vaccine, allergic to flu vaccine.  Patient is advised to continue to follow CDC recommendations such as social distancing wearing a mask and washing hands for at least 20 seconds.  Medications personally reviewed    Follow Up   No follow-ups on file.  Patient was given instructions and counseling regarding her condition or for health maintenance advice. Please see specific information pulled into the AVS if appropriate.              show

## 2024-05-15 DIAGNOSIS — J45.50 SEVERE PERSISTENT ASTHMA, UNCOMPLICATED: Primary | ICD-10-CM

## 2024-05-15 DIAGNOSIS — R76.8 ELEVATED IGE LEVEL: ICD-10-CM

## 2024-05-15 DIAGNOSIS — D72.19 PERIPHERAL EOSINOPHILIA: ICD-10-CM

## 2024-05-15 DIAGNOSIS — J44.89 ASTHMA-COPD OVERLAP SYNDROME: ICD-10-CM

## 2024-05-15 RX ORDER — DUPILUMAB 300 MG/2ML
600 INJECTION, SOLUTION SUBCUTANEOUS ONCE
Qty: 4 ML | Refills: 0 | Status: SHIPPED | OUTPATIENT
Start: 2024-05-15 | End: 2024-05-16 | Stop reason: SDUPTHER

## 2024-05-15 RX ORDER — DUPILUMAB 300 MG/2ML
300 INJECTION, SOLUTION SUBCUTANEOUS
Qty: 4 ML | Refills: 11 | Status: SHIPPED | OUTPATIENT
Start: 2024-05-15 | End: 2024-05-16

## 2024-05-16 ENCOUNTER — TELEPHONE (OUTPATIENT)
Dept: PULMONOLOGY | Facility: CLINIC | Age: 64
End: 2024-05-16

## 2024-05-16 DIAGNOSIS — J45.50 SEVERE PERSISTENT ASTHMA, UNCOMPLICATED: Primary | ICD-10-CM

## 2024-05-16 RX ORDER — DUPILUMAB 300 MG/2ML
300 INJECTION, SOLUTION SUBCUTANEOUS
Qty: 4 ML | Refills: 11 | Status: SHIPPED | OUTPATIENT
Start: 2024-05-16

## 2024-05-16 RX ORDER — DUPILUMAB 300 MG/2ML
600 INJECTION, SOLUTION SUBCUTANEOUS ONCE
Qty: 4 ML | Refills: 0 | Status: SHIPPED | OUTPATIENT
Start: 2024-05-16 | End: 2024-05-16

## 2024-05-16 NOTE — TELEPHONE ENCOUNTER
Caller: Gloria Aquino    Relationship to patient: Self    Best call back number: [phone]     ODESSA IS CALLING PATIENT BACK ABOUT MEDICATION PA. PATIENT WANTS TO MAKE SURE THAT SHE CALLS HER CELL PHONE.

## 2024-05-24 ENCOUNTER — TELEPHONE (OUTPATIENT)
Dept: PULMONOLOGY | Facility: CLINIC | Age: 64
End: 2024-05-24
Payer: COMMERCIAL

## 2024-05-24 DIAGNOSIS — D72.19 PERIPHERAL EOSINOPHILIA: ICD-10-CM

## 2024-05-24 DIAGNOSIS — J45.50 SEVERE PERSISTENT ASTHMA, UNCOMPLICATED: Primary | ICD-10-CM

## 2024-05-24 DIAGNOSIS — J44.89 ASTHMA-COPD OVERLAP SYNDROME: ICD-10-CM

## 2024-05-24 RX ORDER — EPINEPHRINE 0.3 MG/.3ML
0.3 INJECTION SUBCUTANEOUS ONCE
Qty: 1 EACH | Refills: 0 | Status: SHIPPED | OUTPATIENT
Start: 2024-05-24 | End: 2024-05-24

## 2024-05-24 NOTE — TELEPHONE ENCOUNTER
Patient was just seen on 5/14/2024 at which time she was prescribed a prednisone taper.  She should not be finished with that yet?  Thank you.

## 2024-05-24 NOTE — TELEPHONE ENCOUNTER
Spoke to patient, she does have two days left on her taper. She said she will finish the taper, and if she is still having symptoms; she will call and let us know.

## 2024-05-24 NOTE — TELEPHONE ENCOUNTER
Patient called and said she was told to call if she needed more prednisone. Please advise, thank you.

## 2024-05-29 ENCOUNTER — TELEPHONE (OUTPATIENT)
Dept: PULMONOLOGY | Facility: CLINIC | Age: 64
End: 2024-05-29

## 2024-05-29 NOTE — TELEPHONE ENCOUNTER
Caller: Gloria Aquino    Relationship to patient: Self    Best call back number: [phone] (home)       Patient is needing: PT RETURNED [facility] CALL, PLEASE HAVE HER CALL PT BACK

## 2024-06-10 NOTE — PROGRESS NOTES
Primary Care Provider  Anais May APRN   Referring Provider  No ref. provider found      Patient Complaint  Follow-up (1 month ) and COPD      Subjective          Gloria Sunshine presents to [facility]      History of Presenting Illness  Gloria Sunshine is a 64 y.o. female with hypoxic respiratory failure, asthma/COPD overlap syndrome, peripheral eosinophilia, elevated IgE, and tobacco use ongoing, here for 1 month follow-up.    Patient states she has been doing well since her last visit, here today to check-in after receiving her first two Dupixent injections.  She reports that her symptoms have been much better since initiation of biologic therapy.  Patient denies using any antibiotics or steroids for her lungs recently, no fevers or chills, no ER visits or hospitalizations for her breathing since she was last seen.  Patient's last hospitalization for asthma exacerbation was in April 2024.  She has some anxiety surrounding her breathing, which is understandable given her multiple hospitalizations in the past year.  Patient continues to use Symbicort and Spiriva daily as well as her albuterol inhaler and albuterol neb treatments as needed.  Patient also takes chronic daily azithromycin.  She has supplemental oxygen at home, but rarely needs to use it.  She has a POC.  Patient is a current cigarette smoker but is working hard to quit, 12 pack years.  She has been using nicotine patches.  Patient denies any hemoptysis, swollen lymph nodes, or night sweats.  Patient moved to [place] 16 years ago.  She does have a dog but has had dogs for years with no allergic issues.  Overall, patient is doing well and has no additional concerns at this time.  Patient is able to perform ADLs without difficulty.  She works in healthcare, home health.  I have personally reviewed the review of systems, past family, social, medical and surgical histories; and  agree with their findings.      Review of Systems    Review of Systems   Constitutional:  Negative for activity change, chills, fatigue, fever, unexpected weight gain and unexpected weight loss.   HENT:  Negative for congestion, ear discharge, ear pain, mouth sores, postnasal drip, rhinorrhea, sinus pressure, sore throat, swollen glands and trouble swallowing.    Eyes:  Negative for blurred vision, pain, discharge, itching and visual disturbance.   Respiratory:  Positive for chest tightness (occasional, resolves with neb treatment). Negative for apnea, cough, shortness of breath, wheezing and stridor.    Cardiovascular:  Negative for chest pain, palpitations and leg swelling.   Gastrointestinal:  Negative for abdominal distention, abdominal pain, constipation, diarrhea, nausea, vomiting, GERD and indigestion.   Musculoskeletal:  Negative for arthralgias, joint swelling and myalgias.   Skin:  Negative for color change.   Neurological:  Negative for dizziness, weakness, light-headedness and headache.      Sleep: Negative for Excessive daytime sleepiness  Negative for morning headaches  Negative for Snoring      Family History   Problem Relation Age of Onset    Breast cancer Neg Hx     Ovarian cancer Neg Hx     Uterine cancer Neg Hx     Colon cancer Neg Hx     Melanoma Neg Hx         Social History     Socioeconomic History    Marital status:    Tobacco Use    Smoking status: Former     Current packs/day: 0.25     Average packs/day: 0.3 packs/day for 46.0 years (11.5 ttl pk-yrs)     Types: Cigarettes     Start date: 06/2024     Quit date: 5/10/2024    Smokeless tobacco: Never    Tobacco comments:     In the past week has had 2 cigs   Vaping Use    Vaping status: Never Used   Substance and Sexual Activity    Alcohol use: Yes     Comment: occasionally    Drug use: Never    Sexual activity: Yes     Partners: Male     Birth control/protection: Tubal ligation        Past Medical History:   Diagnosis Date    Anxiety      COPD (chronic obstructive pulmonary disease)     Elevated cholesterol     Hyperlipidemia     Hypertension         Immunization History   Administered Date(s) Administered    COVID-19 (MODERNA) 1st,2nd,3rd Dose Monovalent 03/06/2021, 04/02/2021    COVID-19 (MODERNA) Monovalent Original Booster 12/28/2021    COVID-19 (PFIZER) BIVALENT 12+YRS 07/01/2023    Fluzone (or Fluarix & Flulaval for VFC) >6mos 10/08/2019, 01/26/2021, 12/26/2022    Pneumococcal Polysaccharide (PPSV23) 09/22/2020    Shingrix 04/06/2021    TD Preservative Free (Tenivac) 01/05/2024    Td (TDVAX) 07/05/1996, 06/18/2001    Tdap 10/07/2020       Allergies   Allergen Reactions    Influenza Vaccines Swelling     Patient got gouter on neck           Current Outpatient Medications:     albuterol (PROVENTIL) (2.5 MG/3ML) 0.083% nebulizer solution, Take 2.5 mg by nebulization Every 4 (Four) Hours As Needed for Wheezing or Shortness of Air., Disp: 180 mL, Rfl: 11    albuterol sulfate  (90 Base) MCG/ACT inhaler, Inhale 2 puffs into the lungs every 4 (four) hours as needed for Shortness of Air., Disp: 18 g, Rfl: 11    atorvastatin (LIPITOR) 20 MG tablet, Take 1 tablet by mouth nightly., Disp: 90 tablet, Rfl: 1    azithromycin (ZITHROMAX) 250 MG tablet, Take 1 tablet by mouth Daily. Indications: COPD Exacerbation Suppression, Disp: 30 tablet, Rfl: 5    budesonide-formoterol (SYMBICORT) 80-4.5 MCG/ACT inhaler, Inhale 2 puffs 2 (Two) Times a Day., Disp: 1 each, Rfl: 11    Dupilumab (Dupixent) 300 MG/2ML solution pen-injector, Inject 2 mL under the skin into the appropriate area as directed Every 14 (Fourteen) Days., Disp: 4 mL, Rfl: 11    EPINEPHrine (EPIPEN) 0.3 MG/0.3ML solution auto-injector injection, INJECT 0.3 ML UNDER THE SKIN INTO THE APPROPRIATE AREA AS DIRECTED 1 (ONE) TIME FOR 1 DOSE., Disp: , Rfl:     escitalopram (LEXAPRO) 5 MG tablet, Take 1 tablet by mouth daily., Disp: 30 tablet, Rfl: 5    hydroCHLOROthiazide 25 MG tablet, Take 1 tablet  "by mouth daily., Disp: 90 tablet, Rfl: 1    hydrOXYzine (ATARAX) 25 MG tablet, Take 1 tablet by mouth every 8 (eight) hours as needed for Anxiety., Disp: 180 tablet, Rfl: 1    mirtazapine (REMERON) 7.5 MG tablet, Take 1 tablet by mouth nightly., Disp: 90 tablet, Rfl: 1    montelukast (SINGULAIR) 10 MG tablet, Take 1 tablet by mouth Daily., Disp: , Rfl:     multivitamin with minerals tablet tablet, Take 1 tablet by mouth Daily., Disp: , Rfl:     predniSONE (DELTASONE) 20 MG tablet, Take 2 tablets by mouth Daily., Disp: 10 tablet, Rfl: 0    Tiotropium Bromide Monohydrate (Spiriva Respimat) 1.25 MCG/ACT aerosol solution inhaler, Inhale 2 puffs Daily., Disp: 1 each, Rfl: 11    benzonatate (TESSALON) 100 MG capsule, Take 1 capsule by mouth 3 (Three) Times a Day As Needed for Cough. (Patient not taking: Reported on 6/14/2024), Disp: 42 capsule, Rfl: 0    nicotine (NICODERM CQ) 21 MG/24HR patch, Place 1 patch on the skin as directed by provider Daily. (Patient not taking: Reported on 6/14/2024), Disp: , Rfl:     promethazine-dextromethorphan (PROMETHAZINE-DM) 6.25-15 MG/5ML syrup, Take 5 mL by mouth 4 (Four) Times a Day As Needed for Cough. (Patient not taking: Reported on 6/14/2024), Disp: 473 mL, Rfl: 0     Objective     Vital Signs:   /78 (BP Location: Left arm, Patient Position: Sitting, Cuff Size: Adult)   Pulse 82   Temp 98.1 °F (36.7 °C) (Oral)   Resp 16   Ht 180.3 cm (71\")   Wt 58.3 kg (128 lb 9.6 oz)   SpO2 97% Comment: room air  BMI 17.94 kg/m²     Physical Exam  Constitutional:       General: She is not in acute distress.     Appearance: Normal appearance. She is normal weight. She is not ill-appearing.   HENT:      Right Ear: Tympanic membrane and ear canal normal.      Left Ear: Tympanic membrane and ear canal normal.      Nose: Nose normal.      Mouth/Throat:      Mouth: Mucous membranes are moist.      Pharynx: Oropharynx is clear.   Eyes:      Extraocular Movements: Extraocular movements " intact.      Conjunctiva/sclera: Conjunctivae normal.      Pupils: Pupils are equal, round, and reactive to light.   Cardiovascular:      Rate and Rhythm: Normal rate and regular rhythm.      Pulses: Normal pulses.      Heart sounds: Normal heart sounds.   Pulmonary:      Effort: Pulmonary effort is normal. No respiratory distress.      Breath sounds: Normal breath sounds. No stridor. No wheezing, rhonchi or rales.   Abdominal:      General: Bowel sounds are normal.      Palpations: Abdomen is soft.   Musculoskeletal:         General: No swelling. Normal range of motion.      Cervical back: Normal range of motion and neck supple.      Right lower leg: No edema.      Left lower leg: No edema.   Skin:     General: Skin is warm and dry.   Neurological:      General: No focal deficit present.      Mental Status: She is alert and oriented to person, place, and time.      Motor: No weakness.   Psychiatric:         Mood and Affect: Mood normal.         Behavior: Behavior normal.       Result Review :   I have personally reviewed patient's labs and images.  I also reviewed my last office note 5/14/2024.       Diagnoses and all orders for this visit:    1. Asthma-COPD overlap syndrome (Primary)  -     azithromycin (ZITHROMAX) 250 MG tablet; Take 1 tablet by mouth Daily. Indications: COPD Exacerbation Suppression  Dispense: 30 tablet; Refill: 5    2. Acute respiratory failure with hypoxia  -     predniSONE (DELTASONE) 20 MG tablet; Take 2 tablets by mouth Daily.  Dispense: 10 tablet; Refill: 0    3. Pulmonary emphysema, unspecified emphysema type    4. Peripheral eosinophilia    5. Elevated IgE level    6. Dyspnea, unspecified type  -     predniSONE (DELTASONE) 20 MG tablet; Take 2 tablets by mouth Daily.  Dispense: 10 tablet; Refill: 0    7. Tobacco abuse    8. Encounter for smoking cessation counseling    9. Chronic obstructive pulmonary disease with acute exacerbation  -     albuterol (PROVENTIL) (2.5 MG/3ML) 0.083%  nebulizer solution; Take 2.5 mg by nebulization Every 4 (Four) Hours As Needed for Wheezing or Shortness of Air.  Dispense: 180 mL; Refill: 11    10. Shortness of breath  -     albuterol (PROVENTIL) (2.5 MG/3ML) 0.083% nebulizer solution; Take 2.5 mg by nebulization Every 4 (Four) Hours As Needed for Wheezing or Shortness of Air.  Dispense: 180 mL; Refill: 11      Impression and Plan    -CTA 1/28/2024 showed marked emphysema, stable scarring in the left lung base.  No lung nodules evident.  No lymphadenopathy.  Negative for PE.  -Echocardiogram 7/21/2023 EF 51 to 55%, eRVSP mildly elevated 35 to 45 mmHg  -Peripheral eosinophilia 480 on 4/17/2024  -IgE level elevated, 597 on 4/20/2024  -PFTs 5/1/2024 showed severe airflow obstruction, FEV1 25% of predicted, with significant response to bronchodilator with 22% improvement in FEV1.  Severe air trapping present, DLCO severely reduced 19% of predicted.  -Continue wearing 1 L supplemental oxygen to keep O2 saturation at or greater than 90%.  Patient has a POC.  -Continue with Dupixent injections every 2 weeks  -Continue using Symbicort twice daily, reminded patient to rinse mouth after each use  -Continue using Spiriva once daily  -Continue using albuterol inhaler or albuterol nebulizer treatments as needed  -Continue taking chronic daily azithromycin, refills sent to patient's pharmacy today  -Prednisone taper prescribed for patient to have on hand in case of exacerbation  -Continue taking Tessalon Perles and promethazine syrup as needed for cough  -Encouraged patient to continue with recent smoking cessation, continue using nicotine patches.  I spent 5 minutes today counseling patient on the risks of smoking, including throat cancer, lung cancer, COPD, heart disease and death.  -Follow-up in 2-3 months for regular checkup    Smoking status: Reviewed  Vaccination status: Patient reports she is up-to-date with her Covid vaccines, declines pneumonia vaccine, allergic to  flu vaccine.  Patient is advised to continue to follow CDC recommendations such as social distancing wearing a mask and washing hands for at least 20 seconds.  Medications personally reviewed    Follow Up   No follow-ups on file.  Patient was given instructions and counseling regarding her condition or for health maintenance advice. Please see specific information pulled into the AVS if appropriate.

## 2024-06-14 ENCOUNTER — OFFICE VISIT (OUTPATIENT)
Dept: PULMONOLOGY | Facility: CLINIC | Age: 64
End: 2024-06-14
Payer: COMMERCIAL

## 2024-06-14 VITALS
RESPIRATION RATE: 16 BRPM | BODY MASS INDEX: 18 KG/M2 | WEIGHT: 128.6 LBS | SYSTOLIC BLOOD PRESSURE: 124 MMHG | OXYGEN SATURATION: 97 % | TEMPERATURE: 98.1 F | HEART RATE: 82 BPM | HEIGHT: 71 IN | DIASTOLIC BLOOD PRESSURE: 78 MMHG

## 2024-06-14 DIAGNOSIS — D72.19 PERIPHERAL EOSINOPHILIA: ICD-10-CM

## 2024-06-14 DIAGNOSIS — R06.00 DYSPNEA, UNSPECIFIED TYPE: ICD-10-CM

## 2024-06-14 DIAGNOSIS — Z72.0 TOBACCO ABUSE: ICD-10-CM

## 2024-06-14 DIAGNOSIS — J44.89 ASTHMA-COPD OVERLAP SYNDROME: Primary | ICD-10-CM

## 2024-06-14 DIAGNOSIS — Z71.6 ENCOUNTER FOR SMOKING CESSATION COUNSELING: ICD-10-CM

## 2024-06-14 DIAGNOSIS — R06.02 SHORTNESS OF BREATH: ICD-10-CM

## 2024-06-14 DIAGNOSIS — J44.1 CHRONIC OBSTRUCTIVE PULMONARY DISEASE WITH ACUTE EXACERBATION: ICD-10-CM

## 2024-06-14 DIAGNOSIS — R76.8 ELEVATED IGE LEVEL: ICD-10-CM

## 2024-06-14 DIAGNOSIS — J96.01 ACUTE RESPIRATORY FAILURE WITH HYPOXIA: ICD-10-CM

## 2024-06-14 DIAGNOSIS — J43.9 PULMONARY EMPHYSEMA, UNSPECIFIED EMPHYSEMA TYPE: ICD-10-CM

## 2024-06-14 PROCEDURE — 99214 OFFICE O/P EST MOD 30 MIN: CPT

## 2024-06-14 RX ORDER — ALBUTEROL SULFATE 2.5 MG/3ML
2.5 SOLUTION RESPIRATORY (INHALATION) EVERY 4 HOURS PRN
Qty: 180 ML | Refills: 11 | Status: SHIPPED | OUTPATIENT
Start: 2024-06-14

## 2024-06-14 RX ORDER — AZITHROMYCIN 250 MG/1
250 TABLET, FILM COATED ORAL DAILY
Qty: 30 TABLET | Refills: 5 | Status: SHIPPED | OUTPATIENT
Start: 2024-06-14

## 2024-06-14 RX ORDER — EPINEPHRINE 0.3 MG/.3ML
INJECTION SUBCUTANEOUS
COMMUNITY
Start: 2024-05-24

## 2024-06-14 RX ORDER — PREDNISONE 20 MG/1
40 TABLET ORAL DAILY
Qty: 10 TABLET | Refills: 0 | Status: SHIPPED | OUTPATIENT
Start: 2024-06-14

## 2024-07-15 DIAGNOSIS — R05.2 SUBACUTE COUGH: ICD-10-CM

## 2024-07-15 RX ORDER — DEXTROMETHORPHAN HYDROBROMIDE AND PROMETHAZINE HYDROCHLORIDE 15; 6.25 MG/5ML; MG/5ML
SYRUP ORAL
Qty: 420 ML | Refills: 1 | Status: SHIPPED | OUTPATIENT
Start: 2024-07-15

## 2024-08-02 ENCOUNTER — TELEPHONE (OUTPATIENT)
Dept: PULMONOLOGY | Facility: CLINIC | Age: 64
End: 2024-08-02
Payer: COMMERCIAL

## 2024-08-02 NOTE — TELEPHONE ENCOUNTER
Called and spoke with Unruly at [facility] per Unruly they have a prescription for Spiriva and will have it ready in one hour for patient. I called and notified patient. She understood

## 2024-08-26 NOTE — PROGRESS NOTES
Primary Care Provider  Anais May APRN   Referring Provider  No ref. provider found      Patient Complaint  Asthma, COPD, Follow-up (2-3 Month ), and Cough (Sometimes productive )      Subjective          Gloria Sunshine presents to [facility] PULMONARY [facility]ARE MEDICINE      History of Presenting Illness  Gloria Sunshine is a 64 y.o. female patient with chronic hypoxic respiratory failure, asthma/COPD overlap syndrome, peripheral eosinophilia, elevated IgE, and tobacco use ongoing, here for 2 month follow-up.    Patient states she has been doing well since her last visit.  She reports that her symptoms have been much better since initiation of biologic therapy.  Her only respiratory complaint is an occasional cough.  Patient denies using any antibiotics or steroids for her lungs recently, no fevers or chills, no ER visits or hospitalizations for her breathing since she was last seen.  Patient's last hospitalization for asthma exacerbation was in April 2024.  She has some anxiety surrounding her breathing, which is understandable given her multiple hospitalizations in the past year.  Patient does Dupixent injections every 2 weeks since May 2024.  She continues to use Symbicort and Spiriva daily as well as her albuterol inhaler and albuterol neb treatments as needed.  Patient also takes chronic daily azithromycin.  She has supplemental oxygen at home, but rarely needs to use it.  She has a POC.  Patient is a current cigarette smoker but is working hard to quit, only smokes occasionally now, 12 pack years.  She has been using nicotine patches.  Patient denies any hemoptysis, swollen lymph nodes, or night sweats.  Patient moved to [place] 16 years ago.  She does have a dog but has had dogs for years with no allergic issues.  She is going on a 2-week long cruise to the [facility] in November and has some questions regarding her Dupixent injection that will be due during  her time on the cruise.  She is planning on taking her POC and prednisone burst in case she needs it.  Overall, patient is doing well and has no additional concerns at this time.  Patient is able to perform ADLs without difficulty.  She works in healthcare, home health.  I have personally reviewed the review of systems, past family, social, medical and surgical histories; and agree with their findings.      Review of Systems    Review of Systems   Constitutional:  Negative for activity change, chills, fatigue, fever, unexpected weight gain and unexpected weight loss.   HENT:  Negative for congestion, ear discharge, ear pain, mouth sores, postnasal drip, rhinorrhea, sinus pressure, sore throat, swollen glands and trouble swallowing.    Eyes:  Negative for blurred vision, pain, discharge, itching and visual disturbance.   Respiratory:  Positive for cough (occasional). Negative for apnea, chest tightness, shortness of breath, wheezing and stridor.    Cardiovascular:  Negative for chest pain, palpitations and leg swelling.   Gastrointestinal:  Negative for abdominal distention, abdominal pain, constipation, diarrhea, nausea, vomiting, GERD and indigestion.   Musculoskeletal:  Negative for arthralgias, joint swelling and myalgias.   Skin:  Negative for color change.   Neurological:  Negative for dizziness, weakness, light-headedness and headache.      Sleep: Negative for Excessive daytime sleepiness  Negative for morning headaches  Negative for Snoring      Family History   Problem Relation Age of Onset    Breast cancer Neg Hx     Ovarian cancer Neg Hx     Uterine cancer Neg Hx     Colon cancer Neg Hx     Melanoma Neg Hx         Social History     Socioeconomic History    Marital status:    Tobacco Use    Smoking status: Some Days     Current packs/day: 0.25     Average packs/day: 0.3 packs/day for 46.2 years (11.6 ttl pk-yrs)     Types: Cigarettes     Start date: 06/2024     Last attempt to quit: 5/10/2024      Passive exposure: Current    Smokeless tobacco: Never    Tobacco comments:     In the past week has had 2 cigs   Vaping Use    Vaping status: Never Used   Substance and Sexual Activity    Alcohol use: Yes     Comment: occasionally    Drug use: Never    Sexual activity: Yes     Partners: Male     Birth control/protection: Tubal ligation        Past Medical History:   Diagnosis Date    Anxiety     COPD (chronic obstructive pulmonary disease)     Elevated cholesterol     Hyperlipidemia     Hypertension         Immunization History   Administered Date(s) Administered    COVID-19 (MODERNA) 1st,2nd,3rd Dose Monovalent 03/06/2021, 04/02/2021    COVID-19 (MODERNA) Monovalent Original Booster 12/28/2021    COVID-19 (PFIZER) BIVALENT 12+YRS 07/01/2023    Fluzone (or Fluarix & Flulaval for VFC) >6mos 10/08/2019, 01/26/2021, 12/26/2022    Pneumococcal Polysaccharide (PPSV23) 09/22/2020    Shingrix 04/06/2021    TD Preservative Free (Tenivac) 01/05/2024    Td (TDVAX) 07/05/1996, 06/18/2001    Tdap 10/07/2020       Allergies   Allergen Reactions    Influenza Vaccines Swelling     Patient got gouter on neck           Current Outpatient Medications:     albuterol (PROVENTIL) (2.5 MG/3ML) 0.083% nebulizer solution, Take 2.5 mg by nebulization Every 4 (Four) Hours As Needed for Wheezing or Shortness of Air., Disp: 180 mL, Rfl: 11    albuterol sulfate  (90 Base) MCG/ACT inhaler, Inhale 2 puffs into the lungs every 4 (four) hours as needed for Shortness of Air., Disp: 18 g, Rfl: 11    atorvastatin (LIPITOR) 20 MG tablet, Take 1 tablet by mouth nightly., Disp: 90 tablet, Rfl: 1    azithromycin (ZITHROMAX) 250 MG tablet, Take 1 tablet by mouth Daily. Indications: COPD Exacerbation Suppression, Disp: 30 tablet, Rfl: 5    budesonide-formoterol (SYMBICORT) 80-4.5 MCG/ACT inhaler, Inhale 2 puffs 2 (Two) Times a Day., Disp: 1 each, Rfl: 11    Dupilumab (Dupixent) 300 MG/2ML solution pen-injector, Inject 2 mL under the skin into the  "appropriate area as directed Every 14 (Fourteen) Days., Disp: 4 mL, Rfl: 11    EPINEPHrine (EPIPEN) 0.3 MG/0.3ML solution auto-injector injection, INJECT 0.3 ML UNDER THE SKIN INTO THE APPROPRIATE AREA AS DIRECTED 1 (ONE) TIME FOR 1 DOSE., Disp: , Rfl:     escitalopram (LEXAPRO) 5 MG tablet, Take 1 tablet by mouth daily., Disp: 30 tablet, Rfl: 5    hydroCHLOROthiazide 25 MG tablet, Take 1 tablet by mouth daily., Disp: 90 tablet, Rfl: 1    hydrOXYzine (ATARAX) 25 MG tablet, Take 1 tablet by mouth every 8 (eight) hours as needed for Anxiety., Disp: 180 tablet, Rfl: 1    mirtazapine (REMERON) 7.5 MG tablet, Take 1 tablet by mouth nightly., Disp: 90 tablet, Rfl: 1    montelukast (SINGULAIR) 10 MG tablet, Take 1 tablet by mouth Daily., Disp: , Rfl:     multivitamin with minerals tablet tablet, Take 1 tablet by mouth Daily., Disp: , Rfl:     promethazine-dextromethorphan (PROMETHAZINE-DM) 6.25-15 MG/5ML syrup, TAKE 5 MILLILITERS BY MOUTH 4 TIMES A DAY AS NEEDED FOR COUGH, Disp: 420 mL, Rfl: 1    Tiotropium Bromide Monohydrate (Spiriva Respimat) 1.25 MCG/ACT aerosol solution inhaler, Inhale 2 puffs Daily., Disp: 1 each, Rfl: 11    benzonatate (TESSALON) 100 MG capsule, Take 1 capsule by mouth 3 (Three) Times a Day As Needed for Cough. (Patient not taking: Reported on 6/14/2024), Disp: 42 capsule, Rfl: 0    nicotine (NICODERM CQ) 21 MG/24HR patch, Place 1 patch on the skin as directed by provider Daily. (Patient not taking: Reported on 6/14/2024), Disp: , Rfl:     predniSONE (DELTASONE) 20 MG tablet, Take 2 tablets by mouth Daily for 7 days., Disp: 14 tablet, Rfl: 0     Objective     Vital Signs:   /79 (BP Location: Left arm, Patient Position: Sitting, Cuff Size: Adult)   Pulse 75   Temp 98.3 °F (36.8 °C) (Oral)   Resp 16   Ht 180.3 cm (71\")   Wt 62.1 kg (136 lb 12.8 oz)   SpO2 93% Comment: Room air.  BMI 19.08 kg/m²     Physical Exam  Constitutional:       General: She is not in acute distress.     Appearance: " Normal appearance. She is normal weight. She is not ill-appearing.   HENT:      Right Ear: Tympanic membrane and ear canal normal.      Left Ear: Tympanic membrane and ear canal normal.      Nose: Nose normal.      Mouth/Throat:      Mouth: Mucous membranes are moist.      Pharynx: Oropharynx is clear.   Eyes:      Extraocular Movements: Extraocular movements intact.      Conjunctiva/sclera: Conjunctivae normal.      Pupils: Pupils are equal, round, and reactive to light.   Cardiovascular:      Rate and Rhythm: Normal rate and regular rhythm.      Pulses: Normal pulses.      Heart sounds: Normal heart sounds.   Pulmonary:      Effort: Pulmonary effort is normal. No respiratory distress.      Breath sounds: Normal breath sounds. No stridor. No wheezing, rhonchi or rales.   Abdominal:      General: Bowel sounds are normal.      Palpations: Abdomen is soft.   Musculoskeletal:         General: No swelling. Normal range of motion.      Cervical back: Normal range of motion and neck supple.      Right lower leg: No edema.      Left lower leg: No edema.   Skin:     General: Skin is warm and dry.   Neurological:      General: No focal deficit present.      Mental Status: She is alert and oriented to person, place, and time.      Motor: No weakness.   Psychiatric:         Mood and Affect: Mood normal.         Behavior: Behavior normal.       Result Review :   I have personally reviewed patient's labs and images.  I also reviewed my last office note 6/14/2024.       Diagnoses and all orders for this visit:    1. Asthma-COPD overlap syndrome (Primary)  -     predniSONE (DELTASONE) 20 MG tablet; Take 2 tablets by mouth Daily for 7 days.  Dispense: 14 tablet; Refill: 0    2. Chronic respiratory failure with hypoxia    3. Pulmonary emphysema, unspecified emphysema type    4. Peripheral eosinophilia    5. Elevated IgE level    6. Dyspnea, unspecified type    7. Tobacco abuse    8. Encounter for smoking cessation  counseling      Impression and Plan    -CTA 1/28/2024 showed marked emphysema, stable scarring in the left lung base.  No lung nodules evident.  No lymphadenopathy.  Negative for PE.  -Echocardiogram 7/21/2023 EF 51 to 55%, eRVSP mildly elevated 35 to 45 mmHg  -Peripheral eosinophilia 480 on 4/17/2024  -IgE level elevated, 597 on 4/20/2024  -PFTs 5/1/2024 showed severe airflow obstruction, FEV1 25% of predicted, with significant response to bronchodilator with 22% improvement in FEV1.  Severe air trapping present, DLCO severely reduced 19% of predicted.  -Continue wearing 1 L supplemental oxygen to keep O2 saturation at or greater than 90%.  Patient has a POC.  -Continue with Dupixent injections every 2 weeks.  Patient is wondering what she should do during her 2-week cruise in November.  She is scheduled for an injection during the cruise.  I will speak with nurse navigator to see how she go about coordinating this.  -Continue using Symbicort twice daily, reminded patient to rinse mouth after each use  -Continue using Spiriva once daily  -Continue using albuterol inhaler or albuterol nebulizer treatments as needed  -Continue taking chronic daily azithromycin  -Prednisone burst prescribed for patient to have on hand in case of exacerbation  -Continue taking Tessalon Perles and promethazine syrup as needed for cough  -Smoking cessation counseling provided.  I spent 5 minutes today counseling patient on the risks of smoking, including throat cancer, lung cancer, COPD, heart disease and death.  Also discussed the benefits of quitting.  -Follow-up in about 3 months after her cruise, may return sooner if needed    Smoking status: Reviewed  Vaccination status: Patient reports she is up-to-date with her Covid vaccines, declines pneumonia vaccine, allergic to flu vaccine.  Patient is advised to continue to follow CDC recommendations such as social distancing wearing a mask and washing hands for at least 20  seconds.  Medications personally reviewed    Follow Up   No follow-ups on file.  Patient was given instructions and counseling regarding her condition or for health maintenance advice. Please see specific information pulled into the AVS if appropriate.

## 2024-08-27 ENCOUNTER — OFFICE VISIT (OUTPATIENT)
Dept: PULMONOLOGY | Facility: CLINIC | Age: 64
End: 2024-08-27
Payer: COMMERCIAL

## 2024-08-27 VITALS
WEIGHT: 136.8 LBS | OXYGEN SATURATION: 93 % | SYSTOLIC BLOOD PRESSURE: 129 MMHG | TEMPERATURE: 98.3 F | HEART RATE: 75 BPM | HEIGHT: 71 IN | RESPIRATION RATE: 16 BRPM | BODY MASS INDEX: 19.15 KG/M2 | DIASTOLIC BLOOD PRESSURE: 79 MMHG

## 2024-08-27 DIAGNOSIS — Z72.0 TOBACCO ABUSE: ICD-10-CM

## 2024-08-27 DIAGNOSIS — R76.8 ELEVATED IGE LEVEL: ICD-10-CM

## 2024-08-27 DIAGNOSIS — R06.00 DYSPNEA, UNSPECIFIED TYPE: ICD-10-CM

## 2024-08-27 DIAGNOSIS — Z71.6 ENCOUNTER FOR SMOKING CESSATION COUNSELING: ICD-10-CM

## 2024-08-27 DIAGNOSIS — J96.11 CHRONIC RESPIRATORY FAILURE WITH HYPOXIA: ICD-10-CM

## 2024-08-27 DIAGNOSIS — J43.9 PULMONARY EMPHYSEMA, UNSPECIFIED EMPHYSEMA TYPE: ICD-10-CM

## 2024-08-27 DIAGNOSIS — J44.89 ASTHMA-COPD OVERLAP SYNDROME: Primary | ICD-10-CM

## 2024-08-27 DIAGNOSIS — D72.19 PERIPHERAL EOSINOPHILIA: ICD-10-CM

## 2024-08-27 PROCEDURE — 99214 OFFICE O/P EST MOD 30 MIN: CPT

## 2024-08-27 RX ORDER — PREDNISONE 20 MG/1
40 TABLET ORAL DAILY
Qty: 14 TABLET | Refills: 0 | Status: SHIPPED | OUTPATIENT
Start: 2024-08-27 | End: 2024-09-03

## 2024-11-07 ENCOUNTER — TELEPHONE (OUTPATIENT)
Dept: PULMONOLOGY | Facility: CLINIC | Age: 64
End: 2024-11-07
Payer: COMMERCIAL

## 2024-11-07 NOTE — TELEPHONE ENCOUNTER
Received notification from [facility] they have been unable to reach the patient to schedule shipment/delivery of Dupixent. I called and left patient a voicemail regarding this matter. I also provided her with the toll free phone number to reach out to [facility] so they do not cancel her referral.

## 2024-11-11 ENCOUNTER — OFFICE VISIT (OUTPATIENT)
Dept: PULMONOLOGY | Facility: CLINIC | Age: 64
End: 2024-11-11
Payer: COMMERCIAL

## 2024-11-11 DIAGNOSIS — J44.89 ASTHMA-COPD OVERLAP SYNDROME: ICD-10-CM

## 2024-11-11 DIAGNOSIS — R06.02 SHORTNESS OF BREATH: Primary | ICD-10-CM

## 2024-11-11 DIAGNOSIS — R05.8 PRODUCTIVE COUGH: ICD-10-CM

## 2024-11-11 DIAGNOSIS — J44.1 COPD WITH ACUTE EXACERBATION: ICD-10-CM

## 2024-11-11 DIAGNOSIS — Z72.0 TOBACCO ABUSE: ICD-10-CM

## 2024-11-11 LAB
EXPIRATION DATE: NORMAL
FLUAV AG UPPER RESP QL IA.RAPID: NOT DETECTED
FLUBV AG UPPER RESP QL IA.RAPID: NOT DETECTED
INTERNAL CONTROL: NORMAL
Lab: NORMAL
SARS-COV-2 AG UPPER RESP QL IA.RAPID: NOT DETECTED

## 2024-11-11 PROCEDURE — 87428 SARSCOV & INF VIR A&B AG IA: CPT | Performed by: NURSE PRACTITIONER

## 2024-11-11 PROCEDURE — 99214 OFFICE O/P EST MOD 30 MIN: CPT | Performed by: NURSE PRACTITIONER

## 2024-11-11 RX ORDER — AZITHROMYCIN 250 MG/1
TABLET, FILM COATED ORAL
Qty: 6 TABLET | Refills: 0 | Status: SHIPPED | OUTPATIENT
Start: 2024-11-11

## 2024-11-11 RX ORDER — METHYLPREDNISOLONE 4 MG/1
TABLET ORAL
Qty: 21 TABLET | Refills: 0 | Status: SHIPPED | OUTPATIENT
Start: 2024-11-11

## 2024-11-11 NOTE — PROGRESS NOTES
Primary Care Provider  Anais May APRN   Referring Provider  No ref. provider found    Patient Complaint  Cough (Productive cough yellow mucus) and acute      Subjective       History of Presenting Illness  Gloria Sunshine is a pleasant 64 y.o. female who presents to [facility] PULMONARY [facility] CRIT[facility] CARE MEDICINE for acute visit.  Patient just returned from a cruise on Saturday.  Stated her symptoms started on Friday with shortness of air, coughing up yellow mucus.  She has not used her oxygen since October but has been using it now at 1 L/min via nasal cannula.  She continues on Dupixent Symbicort albuterol Spiriva.  She also is continuing to smoke less than half a pack a day.    Patient denies  wheezing, headaches, chest pain, weight loss or hemoptysis. Patient denies fevers, chills and night sweats. Gloria Sunshine is able to perform ADLs without difficulties.    I have personally reviewed the review of systems, past family, social, medical and surgical histories; and agree with their findings.      Review of Systems   Constitutional: Negative.    HENT: Negative.     Respiratory:  Positive for cough and shortness of breath.    Cardiovascular: Negative.    Musculoskeletal: Negative.    Neurological: Negative.    Psychiatric/Behavioral: Negative.           Family History   Problem Relation Age of Onset    Breast cancer Neg Hx     Ovarian cancer Neg Hx     Uterine cancer Neg Hx     Colon cancer Neg Hx     Melanoma Neg Hx         Social History     Socioeconomic History    Marital status:    Tobacco Use    Smoking status: Some Days     Current packs/day: 0.25     Average packs/day: 0.3 packs/day for 46.4 years (11.6 ttl pk-yrs)     Types: Cigarettes     Start date: 06/2024     Last attempt to quit: 5/10/2024     Passive exposure: Current    Smokeless tobacco: Never    Tobacco comments:     In the past week has had 2 cigs   Vaping Use    Vaping status: Never Used    Substance and Sexual Activity    Alcohol use: Yes     Comment: occasionally    Drug use: Never    Sexual activity: Yes     Partners: Male     Birth control/protection: Tubal ligation        Past Medical History:   Diagnosis Date    Anxiety     COPD (chronic obstructive pulmonary disease)     Elevated cholesterol     Hyperlipidemia     Hypertension         Immunization History   Administered Date(s) Administered    COVID-19 (MODERNA) 1st,2nd,3rd Dose Monovalent 03/06/2021, 04/02/2021    COVID-19 (MODERNA) Monovalent Original Booster 12/28/2021    COVID-19 (PFIZER) BIVALENT 12+YRS 07/01/2023    Fluzone (or Fluarix & Flulaval for VFC) >6mos 10/08/2019, 01/26/2021, 12/26/2022    Pneumococcal Polysaccharide (PPSV23) 09/22/2020    Shingrix 04/06/2021    TD Preservative Free (Tenivac) 01/05/2024    Td (TDVAX) 07/05/1996, 06/18/2001    Tdap 10/07/2020       Allergies   Allergen Reactions    Influenza Vaccines Swelling     Patient got gouter on neck           Current Outpatient Medications:     albuterol (PROVENTIL) (2.5 MG/3ML) 0.083% nebulizer solution, Take 2.5 mg by nebulization Every 4 (Four) Hours As Needed for Wheezing or Shortness of Air., Disp: 180 mL, Rfl: 11    albuterol sulfate  (90 Base) MCG/ACT inhaler, Inhale 2 puffs into the lungs every 4 (four) hours as needed for Shortness of Air., Disp: 18 g, Rfl: 11    atorvastatin (LIPITOR) 20 MG tablet, Take 1 tablet by mouth nightly., Disp: 90 tablet, Rfl: 1    benzonatate (TESSALON) 100 MG capsule, Take 1 capsule by mouth 3 (Three) Times a Day As Needed for Cough., Disp: 42 capsule, Rfl: 0    budesonide-formoterol (SYMBICORT) 80-4.5 MCG/ACT inhaler, Inhale 2 puffs 2 (Two) Times a Day., Disp: 1 each, Rfl: 11    Dupilumab (Dupixent) 300 MG/2ML solution pen-injector, Inject 2 mL under the skin into the appropriate area as directed Every 14 (Fourteen) Days., Disp: 4 mL, Rfl: 11    EPINEPHrine (EPIPEN) 0.3 MG/0.3ML solution auto-injector injection, INJECT 0.3 ML  UNDER THE SKIN INTO THE APPROPRIATE AREA AS DIRECTED 1 (ONE) TIME FOR 1 DOSE., Disp: , Rfl:     escitalopram (LEXAPRO) 5 MG tablet, Take 1 tablet by mouth daily., Disp: 30 tablet, Rfl: 5    hydroCHLOROthiazide 25 MG tablet, Take 1 tablet by mouth daily., Disp: 90 tablet, Rfl: 1    hydrOXYzine (ATARAX) 25 MG tablet, Take 1 tablet by mouth every 8 (eight) hours as needed for Anxiety., Disp: 180 tablet, Rfl: 1    mirtazapine (REMERON) 7.5 MG tablet, Take 1 tablet by mouth nightly., Disp: 90 tablet, Rfl: 1    montelukast (SINGULAIR) 10 MG tablet, Take 1 tablet by mouth Daily., Disp: , Rfl:     multivitamin with minerals tablet tablet, Take 1 tablet by mouth Daily., Disp: , Rfl:     Tiotropium Bromide Monohydrate (Spiriva Respimat) 1.25 MCG/ACT aerosol solution inhaler, Inhale 2 puffs Daily., Disp: 1 each, Rfl: 11    azithromycin (ZITHROMAX) 250 MG tablet, Take 2 by mouth today then 1 daily for 4 days, Disp: 6 tablet, Rfl: 0    methylPREDNISolone (MEDROL) 4 MG dose pack, Take as directed on package instructions., Disp: 21 tablet, Rfl: 0    nicotine (NICODERM CQ) 21 MG/24HR patch, Place 1 patch on the skin as directed by provider Daily. (Patient not taking: Reported on 11/11/2024), Disp: , Rfl:          Vital Signs   There were no vitals taken for this visit.      Objective     Physical Exam  Vitals reviewed.   Constitutional:       General: She is not in acute distress.     Appearance: Normal appearance. She is not ill-appearing.   HENT:      Head: Normocephalic and atraumatic.      Nose: Nose normal.      Mouth/Throat:      Mouth: Mucous membranes are moist.      Pharynx: Oropharynx is clear.   Eyes:      Extraocular Movements: Extraocular movements intact.      Conjunctiva/sclera: Conjunctivae normal.      Pupils: Pupils are equal, round, and reactive to light.   Cardiovascular:      Rate and Rhythm: Normal rate and regular rhythm.      Pulses: Normal pulses.      Heart sounds: Normal heart sounds.   Pulmonary:       Effort: Pulmonary effort is normal. No respiratory distress.      Breath sounds: Normal breath sounds. No stridor. No wheezing, rhonchi or rales.   Abdominal:      General: Bowel sounds are normal.   Musculoskeletal:         General: Normal range of motion.      Cervical back: Normal range of motion and neck supple.   Skin:     General: Skin is warm and dry.   Neurological:      Mental Status: She is alert and oriented to person, place, and time.   Psychiatric:         Behavior: Behavior normal.         Results Review  I have personally reviewed the prior office notes, hospital records, labs, and diagnostics.    Assessment         Patient Active Problem List   Diagnosis    Acute respiratory failure with hypoxia    COPD exacerbation    Anxiety    Depression    Hypertension    Hyperlipidemia LDL goal <100    Severe malnutrition    COPD with acute exacerbation    COPD (chronic obstructive pulmonary disease)        Plan     Diagnoses and all orders for this visit:    1. Shortness of breath (Primary)  -     POCT SARS-CoV-2 Antigen TIFFANI + Flu  -     methylPREDNISolone (MEDROL) 4 MG dose pack; Take as directed on package instructions.  Dispense: 21 tablet; Refill: 0  -     azithromycin (ZITHROMAX) 250 MG tablet; Take 2 by mouth today then 1 daily for 4 days  Dispense: 6 tablet; Refill: 0    2. Productive cough  -     POCT SARS-CoV-2 Antigen TIFFANI + Flu  -     methylPREDNISolone (MEDROL) 4 MG dose pack; Take as directed on package instructions.  Dispense: 21 tablet; Refill: 0  -     azithromycin (ZITHROMAX) 250 MG tablet; Take 2 by mouth today then 1 daily for 4 days  Dispense: 6 tablet; Refill: 0    3. COPD with acute exacerbation  -     methylPREDNISolone (MEDROL) 4 MG dose pack; Take as directed on package instructions.  Dispense: 21 tablet; Refill: 0  -     azithromycin (ZITHROMAX) 250 MG tablet; Take 2 by mouth today then 1 daily for 4 days  Dispense: 6 tablet; Refill: 0    4. Asthma-COPD overlap  syndrome  Comments:  Continue with Symbicort, Spiriva, Dupixent, albuterol    5. Tobacco abuse             Smoking status:  reports that she has been smoking cigarettes. She started smoking about 5 months ago. She has a 11.6 pack-year smoking history. She has been exposed to tobacco smoke. She has never used smokeless tobacco.    Vaccination status: Reviewed  Immunization History   Administered Date(s) Administered    COVID-19 (MODERNA) 1st,2nd,3rd Dose Monovalent 03/06/2021, 04/02/2021    COVID-19 (MODERNA) Monovalent Original Booster 12/28/2021    COVID-19 (PFIZER) BIVALENT 12+YRS 07/01/2023    Fluzone (or Fluarix & Flulaval for VFC) >6mos 10/08/2019, 01/26/2021, 12/26/2022    Pneumococcal Polysaccharide (PPSV23) 09/22/2020    Shingrix 04/06/2021    TD Preservative Free (Tenivac) 01/05/2024    Td (TDVAX) 07/05/1996, 06/18/2001    Tdap 10/07/2020        Medications personally reviewed    Follow Up  Return for Next scheduled follow up.    Patient was given instructions and counseling regarding her condition or for health maintenance advice. Please see specific information pulled into the AVS if appropriate.     I spent 20 minutes caring for Gloria Sunshine on this date of service. This time includes time spent by me in the following activities:preparing for the visit, reviewing tests, obtaining and/or reviewing a separately obtained history, performing a medically appropriate examination and/or evaluation, counseling and educating the patient/family/caregiver, ordering medications, tests, or procedures, documenting information in the medical record, independently interpreting results and communicating that information with the patient/family/caregiver and answered questions family members, discuss medications.

## 2024-12-09 DIAGNOSIS — R05.2 SUBACUTE COUGH: ICD-10-CM

## 2024-12-09 RX ORDER — DEXTROMETHORPHAN HYDROBROMIDE AND PROMETHAZINE HYDROCHLORIDE 15; 6.25 MG/5ML; MG/5ML
SYRUP ORAL
Qty: 420 ML | Refills: 1 | OUTPATIENT
Start: 2024-12-09

## 2025-01-02 NOTE — PROGRESS NOTES
[facility]   Hospitalist Progress Note  Date: 3/24/2024  Patient Name: Gloria Sunshine  : 1960  MRN: [id number]  Date of admission: 3/23/2024  Room/Bed: [facility]      Subjective   Subjective     Chief Complaint: short of air     Summary:Gloria Sunshine is a 63 y.o. female female past medical history of COPD that presents to the emergency department for evaluation of shortness of breath x 3 weeks that got progressively worse today prompting her to seek further medical attention.  She does endorse some chest pain as well that she describes as burning, across her entire chest, intermittent, no known aggravating alleviating factors.  She denies any fevers, chills, sweats, palpitations, abdominal pain diarrhea constipation, dysuria, weakness, rash.  She does endorse nausea and vomiting as well.  In the emergency department started on IV steroids and respiratory treatments due to significant wheeze and respiratory distress on exam.  She has improved some but still not back to baseline therefore will be admitted for ongoing monitoring management.     Interval Followup:   Patient states her breathing is fine at rest however with any exertion she gets short of breath and has wheezing and coughing  Patient currently is on room air  Afebrile     Review of Systems    All systems reviewed and negative except for what is outlined above.      Objective   Objective     Vitals:   Temp:  [97.5 °F (36.4 °C)-99.3 °F (37.4 °C)] 98.1 °F (36.7 °C)  Heart Rate:  [] 75  Resp:  [16-22] 18  BP: (108-170)/() 124/76  Flow (L/min):  [3] 3    Physical Exam   General: Awake, alert, NAD resting in bed   HENT: NCAT, MMM  Eyes: pupils equal, no scleral icterus  Cardiovascular: RRR, no murmurs   Pulmonary: decreased. Minimal wheezing   Gastrointestinal: S/ND/NT, +BS  Musculoskeletal: No gross deformities  Skin: No jaundice, no rash on exposed skin appreciated  Neuro: CN II through XII grossly intact; speech  clear; no tremor  Psych: Mood and affect appropriate  : No Lyman catheter; no suprapubic tenderness    Result Review    Result Review:  I have personally reviewed these results:  [x]  Laboratory      Lab 03/24/24  0554 03/23/24  1524   WBC 8.56 7.41   HEMOGLOBIN 12.1 13.8   HEMATOCRIT 37.2 41.6   PLATELETS 411 424   NEUTROS ABS 6.93 4.63   IMMATURE GRANS (ABS) 0.02 0.01   LYMPHS ABS 1.26 1.69   MONOS ABS 0.34 0.65   EOS ABS 0.00 0.35   MCV 87.3 87.2   LACTATE  --  1.7         Lab 03/24/24  0554 03/23/24  1524   SODIUM 137 138   POTASSIUM 4.6 4.6   CHLORIDE 101 99   CO2 25.0 26.5   ANION GAP 11.0 12.5   BUN 10 6*   CREATININE 0.61 0.66   EGFR 101.2 99.3   GLUCOSE 117* 112*   CALCIUM 9.6 9.7         Lab 03/24/24  0554 03/23/24  1524   TOTAL PROTEIN 6.7 7.7   ALBUMIN 4.1 4.7   GLOBULIN 2.6 3.0   ALT (SGPT) 18 24   AST (SGOT) 18 27   BILIRUBIN 0.2 0.3   ALK PHOS 58 70         Lab 03/23/24  1524   PROBNP 128.2   HSTROP T 8                 Brief Urine Lab Results       None          [x]  Microbiology   Microbiology Results (last 10 days)       Procedure Component Value - Date/Time    Respiratory Culture - Sputum, Cough [[id number]] Collected: 03/24/24 0356    Lab Status: Preliminary result Specimen: Sputum from Cough Updated: 03/24/24 0554     Gram Stain Occasional Gram positive bacilli      Moderate (3+) WBCs seen      Few (2+) Epithelial cells seen      Moderate (3+) Gram positive cocci in pairs and chains    COVID-19, FLU A/B, RSV PCR 1 HR TAT - Swab, Nasopharynx [[id number]]  (Normal) Collected: 03/23/24 1526    Lab Status: Final result Specimen: Swab from Nasopharynx Updated: 03/23/24 1619     COVID19 Not Detected     Influenza A PCR Not Detected     Influenza B PCR Not Detected     RSV, PCR Not Detected    Narrative:      Fact sheet for providers: https://www.fda.gov/media/662780/download    Fact sheet for patients: https://www.fda.gov/media/110998/download    Test performed by PCR.          [x]  Radiology  XR Chest  1 View    Result Date: 3/23/2024  Impression: Emphysematous changes without evidence of acute cardiopulmonary abnormality.   Electronically Signed By-Maryuri Navas MD On:3/23/2024 3:35 PM     []  EKG/Telemetry   []  Cardiology/Vascular   []  Pathology  []  Old records  []  Other:    Assessment & Plan   Assessment / Plan     Assessment:  Acute exacerbation of COPD  HTN      Plan:  Continue patient on bronchodilators  Continue patient on IV steroids  Continue azithromycin  Respiratory viral panel is negative  Resume home medications        Discussed with RN.    DVT prophylaxis:  Medical DVT prophylaxis orders are present.        CODE STATUS:   Code Status (Patient has no pulse and is not breathing): CPR (Attempt to Resuscitate)  Medical Interventions (Patient has pulse or is breathing): Full Support      Electronically signed by Prabhakar Angel DO, 3/24/2024, 10:21 EDT.                      MEDICINE

## 2025-01-07 DIAGNOSIS — J44.1 COPD WITH ACUTE EXACERBATION: ICD-10-CM

## 2025-01-07 DIAGNOSIS — R06.02 SHORTNESS OF BREATH: ICD-10-CM

## 2025-01-07 DIAGNOSIS — R05.2 SUBACUTE COUGH: ICD-10-CM

## 2025-01-07 DIAGNOSIS — R05.8 PRODUCTIVE COUGH: ICD-10-CM

## 2025-01-08 RX ORDER — AZITHROMYCIN 250 MG/1
TABLET, FILM COATED ORAL
Qty: 21 TABLET | Refills: 8 | Status: SHIPPED | OUTPATIENT
Start: 2025-01-08

## 2025-01-08 RX ORDER — DEXTROMETHORPHAN HYDROBROMIDE AND PROMETHAZINE HYDROCHLORIDE 15; 6.25 MG/5ML; MG/5ML
SYRUP ORAL
Qty: 420 ML | Refills: 1 | Status: SHIPPED | OUTPATIENT
Start: 2025-01-08

## 2025-01-14 NOTE — PROGRESS NOTES
Primary Care Provider  Anais May APRN   Referring Provider  No ref. provider found      Patient Complaint  Asthma-COPD overlap syndrome, Follow-up (2 Month ), Cough (Sometimes productive, clear ), and Med Management (Has not had dupixent for 3 weeks /)      Subjective          Gloria Sunshine presents to [facility]      History of Presenting Illness  Gloria Sunshine is a 64 y.o. female patient with chronic hypoxic respiratory failure, asthma/COPD overlap syndrome, peripheral eosinophilia, elevated IgE, and tobacco use ongoing, here for 2 month follow-up.    Patient states she has been doing well since her last visit, was seen about 6 weeks ago for acute visit at which time she was prescribed Medrol dosepak and Zpack.  She is feeling better today.  Her primary concern at today's visit is that she has not had her Dupixent injection in 3 weeks, she reports that her insurance is now requesting that it be sent to a different specialty pharmacy.  Generally, patient's breathing has been much better since initiation of biologic therapy.  Her only respiratory complaint is an occasional cough.  Patient denies any fevers or chills, no ER visits or hospitalizations for her breathing since she was last seen.  Patient's last hospitalization for asthma exacerbation was in April 2024.  She has some anxiety surrounding her breathing, which is understandable given her multiple hospitalizations in the past year.  She has more symptoms during the change of seasons.  Patient continues with Dupixent injections every 2 weeks since May 2024.  She continues to use Symbicort and Spiriva daily as well as her albuterol inhaler and albuterol neb treatments as needed.  Patient also takes chronic daily azithromycin.  She has supplemental oxygen at home, but rarely needs to use it.  She has a POC.  Patient is a current cigarette smoker but is working hard to quit, only  smokes some days now, 12 pack years.  She has been using nicotine patches.  Patient denies any hemoptysis, swollen lymph nodes, or night sweats.  Patient moved to [place] 16 years ago.  She does have a dog but has had dogs for years with no allergic issues.  Overall, patient is doing well and has no additional concerns at this time.  Patient is able to perform ADLs without difficulty.  She works in healthcare, home health.  I have personally reviewed the review of systems, past family, social, medical and surgical histories; and agree with their findings.        Review of Systems    Review of Systems   Constitutional:  Negative for activity change, chills, fatigue, fever, unexpected weight gain and unexpected weight loss.   HENT:  Negative for congestion, ear discharge, ear pain, mouth sores, postnasal drip, rhinorrhea, sinus pressure, sore throat, swollen glands and trouble swallowing.    Eyes:  Negative for blurred vision, pain, discharge, itching and visual disturbance.   Respiratory:  Positive for cough (occasional). Negative for apnea, chest tightness, shortness of breath, wheezing and stridor.    Cardiovascular:  Negative for chest pain, palpitations and leg swelling.   Gastrointestinal:  Negative for abdominal distention, abdominal pain, constipation, diarrhea, nausea, vomiting, GERD and indigestion.   Musculoskeletal:  Negative for arthralgias, joint swelling and myalgias.   Skin:  Negative for color change.   Neurological:  Negative for dizziness, weakness, light-headedness and headache.      Sleep: Negative for Excessive daytime sleepiness  Negative for morning headaches  Negative for Snoring      Family History   Problem Relation Age of Onset    Breast cancer Neg Hx     Ovarian cancer Neg Hx     Uterine cancer Neg Hx     Colon cancer Neg Hx     Melanoma Neg Hx         Social History     Socioeconomic History    Marital status:    Tobacco Use    Smoking status: Some Days     Current packs/day: 0.25      Average packs/day: 0.3 packs/day for 46.6 years (11.7 ttl pk-yrs)     Types: Cigarettes     Start date: 06/2024     Last attempt to quit: 5/10/2024     Passive exposure: Current    Smokeless tobacco: Never    Tobacco comments:     In the past week has had 2 cigs   Vaping Use    Vaping status: Never Used   Substance and Sexual Activity    Alcohol use: Yes     Comment: occasionally    Drug use: Never    Sexual activity: Yes     Partners: Male     Birth control/protection: Tubal ligation        Past Medical History:   Diagnosis Date    Anxiety     COPD (chronic obstructive pulmonary disease)     Elevated cholesterol     Hyperlipidemia     Hypertension         Immunization History   Administered Date(s) Administered    COVID-19 (MODERNA) 1st,2nd,3rd Dose Monovalent 03/06/2021, 04/02/2021    COVID-19 (MODERNA) Monovalent Original Booster 12/28/2021    COVID-19 (PFIZER) BIVALENT 12+YRS 07/01/2023    Fluzone (or Fluarix & Flulaval for VFC) >6mos 10/08/2019, 01/26/2021, 12/26/2022    Pneumococcal Polysaccharide (PPSV23) 09/22/2020    Shingrix 04/06/2021    TD Preservative Free (Tenivac) 01/05/2024    Td (TDVAX) 07/05/1996, 06/18/2001    Tdap 10/07/2020       Allergies   Allergen Reactions    Influenza Vaccines Swelling     Patient got gouter on neck           Current Outpatient Medications:     albuterol (PROVENTIL) (2.5 MG/3ML) 0.083% nebulizer solution, Take 2.5 mg by nebulization Every 4 (Four) Hours As Needed for Wheezing or Shortness of Air., Disp: 180 mL, Rfl: 11    albuterol sulfate  (90 Base) MCG/ACT inhaler, Inhale 2 puffs into the lungs every 4 (four) hours as needed for Shortness of Air., Disp: 18 g, Rfl: 11    atorvastatin (LIPITOR) 20 MG tablet, Take 1 tablet by mouth nightly., Disp: 90 tablet, Rfl: 1    azithromycin (ZITHROMAX) 250 MG tablet, TAKE 1 TABLET BY MOUTH DAILY. INDICATIONS: COPD EXACERBATION SUPPRESSION, Disp: 21 tablet, Rfl: 8    benzonatate (TESSALON) 100 MG capsule, Take 1 capsule by  "mouth 3 (Three) Times a Day As Needed for Cough., Disp: 42 capsule, Rfl: 0    budesonide-formoterol (SYMBICORT) 80-4.5 MCG/ACT inhaler, Inhale 2 puffs 2 (Two) Times a Day., Disp: 1 each, Rfl: 11    Dupilumab (Dupixent) 300 MG/2ML solution pen-injector, Inject 2 mL under the skin into the appropriate area as directed Every 14 (Fourteen) Days., Disp: 4 mL, Rfl: 11    EPINEPHrine (EPIPEN) 0.3 MG/0.3ML solution auto-injector injection, INJECT 0.3 ML UNDER THE SKIN INTO THE APPROPRIATE AREA AS DIRECTED 1 (ONE) TIME FOR 1 DOSE., Disp: , Rfl:     escitalopram (LEXAPRO) 5 MG tablet, Take 1 tablet by mouth daily., Disp: 30 tablet, Rfl: 5    hydroCHLOROthiazide 25 MG tablet, Take 1 tablet by mouth daily., Disp: 90 tablet, Rfl: 1    hydrOXYzine (ATARAX) 25 MG tablet, Take 1 tablet by mouth every 8 (eight) hours as needed for Anxiety., Disp: 180 tablet, Rfl: 1    methylPREDNISolone (MEDROL) 4 MG dose pack, Take as directed on package instructions., Disp: 21 tablet, Rfl: 0    mirtazapine (REMERON) 7.5 MG tablet, Take 1 tablet by mouth nightly., Disp: 90 tablet, Rfl: 1    montelukast (SINGULAIR) 10 MG tablet, Take 1 tablet by mouth Daily., Disp: , Rfl:     multivitamin with minerals tablet tablet, Take 1 tablet by mouth Daily., Disp: , Rfl:     nicotine (NICODERM CQ) 21 MG/24HR patch, Place 1 patch on the skin as directed by provider Daily., Disp: , Rfl:     Tiotropium Bromide Monohydrate (Spiriva Respimat) 1.25 MCG/ACT aerosol solution inhaler, Inhale 2 puffs Daily., Disp: 1 each, Rfl: 11     Objective     Vital Signs:   /97 (BP Location: Left arm, Patient Position: Sitting, Cuff Size: Adult)   Pulse 88   Temp 98.4 °F (36.9 °C) (Oral)   Resp 16   Ht 180.3 cm (71\")   Wt 63.3 kg (139 lb 9.6 oz)   SpO2 96% Comment: Room air  BMI 19.47 kg/m²     Physical Exam  Constitutional:       General: She is not in acute distress.     Appearance: Normal appearance. She is normal weight. She is not ill-appearing.   HENT:      Right " Ear: Tympanic membrane and ear canal normal.      Left Ear: Tympanic membrane and ear canal normal.      Nose: Nose normal.      Mouth/Throat:      Mouth: Mucous membranes are moist.      Pharynx: Oropharynx is clear.   Eyes:      Extraocular Movements: Extraocular movements intact.      Conjunctiva/sclera: Conjunctivae normal.      Pupils: Pupils are equal, round, and reactive to light.   Cardiovascular:      Rate and Rhythm: Normal rate and regular rhythm.      Pulses: Normal pulses.      Heart sounds: Normal heart sounds.   Pulmonary:      Effort: Pulmonary effort is normal. No respiratory distress.      Breath sounds: Normal breath sounds. No stridor. No wheezing, rhonchi or rales.   Abdominal:      General: Bowel sounds are normal.      Palpations: Abdomen is soft.   Musculoskeletal:         General: No swelling. Normal range of motion.      Cervical back: Normal range of motion and neck supple.      Right lower leg: No edema.      Left lower leg: No edema.   Skin:     General: Skin is warm and dry.   Neurological:      General: No focal deficit present.      Mental Status: She is alert and oriented to person, place, and time.      Motor: No weakness.   Psychiatric:         Mood and Affect: Mood normal.         Behavior: Behavior normal.       Result Review :   I have personally reviewed patient's labs and images.  I also reviewed Catina PARKER's last office note 11/11/2024.       Diagnoses and all orders for this visit:    1. Asthma-COPD overlap syndrome (Primary)    2. Chronic respiratory failure with hypoxia    3. Pulmonary emphysema, unspecified emphysema type    4. Elevated IgE level    5. Peripheral eosinophilia    6. Dyspnea, unspecified type  -     Tiotropium Bromide Monohydrate (Spiriva Respimat) 1.25 MCG/ACT aerosol solution inhaler; Inhale 2 puffs Daily.  Dispense: 1 each; Refill: 11    7. Tobacco abuse    8. Encounter for smoking cessation counseling      Impression and Plan    -PFTs 5/1/2024  showed severe airflow obstruction, FEV1 25% of predicted, with significant response to bronchodilator with 22% improvement in FEV1.  Severe air trapping present, DLCO severely reduced 19% of predicted.  -CTA 1/28/2024 showed marked emphysema, stable scarring in the left lung base.  No lung nodules evident.  No lymphadenopathy.  Negative for PE.  -Echocardiogram 7/21/2023 EF 51 to 55%, eRVSP mildly elevated 35 to 45 mmHg  -Peripheral eosinophilia 480 on 4/17/2024  -IgE level elevated, 597 on 4/20/2024  -Continue wearing 1 L supplemental oxygen to keep O2 saturation at or greater than 90%.  Patient has a POC.  -Continue with Dupixent injections every 2 weeks since May 2024.  Will reach out to nurse navigator to see if we can figure out to patient's insurance/specialty pharmacy issues.  -Continue using Symbicort twice daily, reminded patient to rinse mouth after each use  -Continue using Spiriva once daily  -Continue using albuterol inhaler or albuterol nebulizer treatments as needed  -Continue taking chronic daily azithromycin  -Continue taking Tessalon Perles and promethazine syrup as needed for cough  -Smoking cessation counseling provided.  I spent 5 minutes today counseling patient on the risks of smoking, including throat cancer, lung cancer, COPD, heart disease and death.  Also discussed the benefits of quitting.  -Follow-up in 3 months, may return sooner if needed    Smoking status: Reviewed  Vaccination status: Patient reports she is up-to-date with her Covid and pneumonia vaccines, allergic to flu vaccine.  Patient is advised to continue to follow CDC recommendations such as social distancing wearing a mask and washing hands for at least 20 seconds.  Medications personally reviewed    Follow Up   No follow-ups on file.  Patient was given instructions and counseling regarding her condition or for health maintenance advice. Please see specific information pulled into the AVS if appropriate.

## 2025-01-16 ENCOUNTER — OFFICE VISIT (OUTPATIENT)
Dept: PULMONOLOGY | Facility: CLINIC | Age: 65
End: 2025-01-16
Payer: COMMERCIAL

## 2025-01-16 VITALS
DIASTOLIC BLOOD PRESSURE: 97 MMHG | OXYGEN SATURATION: 96 % | HEART RATE: 88 BPM | WEIGHT: 139.6 LBS | TEMPERATURE: 98.4 F | RESPIRATION RATE: 16 BRPM | HEIGHT: 71 IN | BODY MASS INDEX: 19.54 KG/M2 | SYSTOLIC BLOOD PRESSURE: 149 MMHG

## 2025-01-16 DIAGNOSIS — Z72.0 TOBACCO ABUSE: ICD-10-CM

## 2025-01-16 DIAGNOSIS — J43.9 PULMONARY EMPHYSEMA, UNSPECIFIED EMPHYSEMA TYPE: ICD-10-CM

## 2025-01-16 DIAGNOSIS — R76.8 ELEVATED IGE LEVEL: ICD-10-CM

## 2025-01-16 DIAGNOSIS — Z71.6 ENCOUNTER FOR SMOKING CESSATION COUNSELING: ICD-10-CM

## 2025-01-16 DIAGNOSIS — J44.89 ASTHMA-COPD OVERLAP SYNDROME: Primary | ICD-10-CM

## 2025-01-16 DIAGNOSIS — R06.00 DYSPNEA, UNSPECIFIED TYPE: ICD-10-CM

## 2025-01-16 DIAGNOSIS — J96.11 CHRONIC RESPIRATORY FAILURE WITH HYPOXIA: ICD-10-CM

## 2025-01-16 DIAGNOSIS — D72.19 PERIPHERAL EOSINOPHILIA: ICD-10-CM

## 2025-01-16 RX ORDER — TIOTROPIUM BROMIDE INHALATION SPRAY 1.56 UG/1
2 SPRAY, METERED RESPIRATORY (INHALATION) DAILY
Qty: 1 EACH | Refills: 11 | Status: SHIPPED | OUTPATIENT
Start: 2025-01-16

## 2025-01-20 ENCOUNTER — TELEPHONE (OUTPATIENT)
Dept: OTHER | Facility: HOSPITAL | Age: 65
End: 2025-01-20
Payer: COMMERCIAL

## 2025-01-22 ENCOUNTER — TELEPHONE (OUTPATIENT)
Dept: OTHER | Facility: HOSPITAL | Age: 65
End: 2025-01-22
Payer: COMMERCIAL

## 2025-01-22 NOTE — TELEPHONE ENCOUNTER
Left follow up message for patient - still need Rx benefit information in order to pursue prior auth on Dupixent. Unable to proceed without this information.    Abby Liao, PharmD  Specialty Pharmacist  Pulmonology/Gastroenterology    [phone]

## 2025-01-24 ENCOUNTER — SPECIALTY PHARMACY (OUTPATIENT)
Dept: OTHER | Facility: HOSPITAL | Age: 65
End: 2025-01-24
Payer: COMMERCIAL

## 2025-01-24 NOTE — PROGRESS NOTES
Attempted to call patient three times, left VM each time. Unable to send "[employer]t Message.     Specialty pharmacy was asked to do a benefits investigation for Dupixent. I am unable to locate/pull prescription benefits. I need this information to proceed.    Abby Liao, PharmD  Specialty Pharmacist  Pulmonology/Gastroenterology    [phone]

## 2025-01-31 ENCOUNTER — TELEPHONE (OUTPATIENT)
Dept: PULMONOLOGY | Facility: CLINIC | Age: 65
End: 2025-01-31
Payer: COMMERCIAL

## 2025-01-31 DIAGNOSIS — J44.1 COPD EXACERBATION: ICD-10-CM

## 2025-01-31 DIAGNOSIS — R06.00 DYSPNEA, UNSPECIFIED TYPE: ICD-10-CM

## 2025-01-31 RX ORDER — BUDESONIDE AND FORMOTEROL FUMARATE DIHYDRATE 80; 4.5 UG/1; UG/1
2 AEROSOL RESPIRATORY (INHALATION) 2 TIMES DAILY
Qty: 3 EACH | Refills: 3 | Status: SHIPPED | OUTPATIENT
Start: 2025-01-31

## 2025-01-31 NOTE — TELEPHONE ENCOUNTER
Called and spoke with the patient regarding prescription information on file is in-eligible. She states she will bring insurance card by Monday 2/3/2025 so we can copy.

## 2025-02-19 ENCOUNTER — TELEPHONE (OUTPATIENT)
Dept: OTHER | Facility: HOSPITAL | Age: 65
End: 2025-02-19
Payer: COMMERCIAL

## 2025-02-19 NOTE — TELEPHONE ENCOUNTER
Per , the patient must use [facility] for Dupixent prescription. Medication sent to [facility]. Message left for patient to inform her of the pharmacy change.    Abby Liao, PharmD  Specialty Pharmacist  Pulmonology/Gastroenterology    [phone]

## 2025-04-14 DIAGNOSIS — R05.2 SUBACUTE COUGH: ICD-10-CM

## 2025-04-14 RX ORDER — BENZONATATE 100 MG/1
CAPSULE ORAL
Qty: 42 CAPSULE | Refills: 0 | Status: SHIPPED | OUTPATIENT
Start: 2025-04-14